# Patient Record
Sex: MALE | Race: WHITE | Employment: OTHER | URBAN - METROPOLITAN AREA
[De-identification: names, ages, dates, MRNs, and addresses within clinical notes are randomized per-mention and may not be internally consistent; named-entity substitution may affect disease eponyms.]

---

## 2017-01-06 ENCOUNTER — TELEPHONE (OUTPATIENT)
Dept: INTERNAL MEDICINE | Age: 82
End: 2017-01-06

## 2017-01-06 ENCOUNTER — TELEPHONE (OUTPATIENT)
Dept: PHARMACY | Age: 82
End: 2017-01-06

## 2017-01-09 ENCOUNTER — OFFICE VISIT (OUTPATIENT)
Dept: INTERNAL MEDICINE | Age: 82
End: 2017-01-09

## 2017-01-09 VITALS
DIASTOLIC BLOOD PRESSURE: 62 MMHG | BODY MASS INDEX: 24.12 KG/M2 | WEIGHT: 154 LBS | SYSTOLIC BLOOD PRESSURE: 104 MMHG | HEART RATE: 60 BPM | RESPIRATION RATE: 16 BRPM

## 2017-01-09 DIAGNOSIS — I10 ESSENTIAL HYPERTENSION, BENIGN: ICD-10-CM

## 2017-01-09 DIAGNOSIS — I48.20 CHRONIC ATRIAL FIBRILLATION (HCC): ICD-10-CM

## 2017-01-09 DIAGNOSIS — R41.3 MEMORY LOSS: ICD-10-CM

## 2017-01-09 DIAGNOSIS — F22 DELUSIONS (HCC): Primary | ICD-10-CM

## 2017-01-09 DIAGNOSIS — K08.9 POOR DENTITION: ICD-10-CM

## 2017-01-09 PROCEDURE — 99214 OFFICE O/P EST MOD 30 MIN: CPT | Performed by: INTERNAL MEDICINE

## 2017-01-09 ASSESSMENT — ENCOUNTER SYMPTOMS
BACK PAIN: 0
GASTROINTESTINAL NEGATIVE: 1
RESPIRATORY NEGATIVE: 1

## 2017-01-11 ENCOUNTER — TELEPHONE (OUTPATIENT)
Dept: INTERNAL MEDICINE | Age: 82
End: 2017-01-11

## 2017-01-11 RX ORDER — RISPERIDONE 1 MG/1
1 TABLET, FILM COATED ORAL NIGHTLY
Qty: 30 TABLET | Refills: 5 | Status: SHIPPED | OUTPATIENT
Start: 2017-01-11 | End: 2017-06-20 | Stop reason: SDUPTHER

## 2017-01-11 RX ORDER — LORAZEPAM 0.5 MG/1
0.5 TABLET ORAL EVERY 6 HOURS PRN
Qty: 30 TABLET | Refills: 1 | OUTPATIENT
Start: 2017-01-11 | End: 2018-01-11

## 2017-01-16 ENCOUNTER — ANTI-COAG VISIT (OUTPATIENT)
Dept: PHARMACY | Age: 82
End: 2017-01-16

## 2017-01-16 DIAGNOSIS — I48.20 CHRONIC ATRIAL FIBRILLATION (HCC): ICD-10-CM

## 2017-01-16 DIAGNOSIS — Z79.01 LONG TERM CURRENT USE OF ANTICOAGULANT THERAPY: ICD-10-CM

## 2017-01-16 LAB — INR BLD: 1.9

## 2017-01-17 RX ORDER — TAMSULOSIN HYDROCHLORIDE 0.4 MG/1
CAPSULE ORAL
Qty: 30 CAPSULE | Refills: 5 | Status: SHIPPED | OUTPATIENT
Start: 2017-01-17 | End: 2017-07-16 | Stop reason: SDUPTHER

## 2017-01-23 RX ORDER — DIGOXIN 125 MCG
TABLET ORAL
Qty: 90 TABLET | Refills: 3 | Status: SHIPPED | OUTPATIENT
Start: 2017-01-23 | End: 2018-01-11 | Stop reason: SDUPTHER

## 2017-01-31 RX ORDER — ALENDRONATE SODIUM 70 MG/1
TABLET ORAL
Qty: 12 TABLET | Refills: 3 | Status: SHIPPED | OUTPATIENT
Start: 2017-01-31 | End: 2018-02-13 | Stop reason: SDUPTHER

## 2017-02-13 ENCOUNTER — OFFICE VISIT (OUTPATIENT)
Dept: INTERNAL MEDICINE | Age: 82
End: 2017-02-13

## 2017-02-13 VITALS
HEART RATE: 60 BPM | DIASTOLIC BLOOD PRESSURE: 68 MMHG | BODY MASS INDEX: 22.4 KG/M2 | RESPIRATION RATE: 16 BRPM | WEIGHT: 143 LBS | SYSTOLIC BLOOD PRESSURE: 100 MMHG

## 2017-02-13 DIAGNOSIS — I48.20 CHRONIC ATRIAL FIBRILLATION (HCC): Primary | ICD-10-CM

## 2017-02-13 DIAGNOSIS — F22 DELUSIONS (HCC): ICD-10-CM

## 2017-02-13 DIAGNOSIS — R41.3 MEMORY LOSS: ICD-10-CM

## 2017-02-13 DIAGNOSIS — K08.9 POOR DENTITION: ICD-10-CM

## 2017-02-13 LAB
INTERNATIONAL NORMALIZATION RATIO, POC: 2
PROTHROMBIN TIME, POC: 24.4

## 2017-02-13 PROCEDURE — 85610 PROTHROMBIN TIME: CPT | Performed by: INTERNAL MEDICINE

## 2017-02-13 PROCEDURE — 99214 OFFICE O/P EST MOD 30 MIN: CPT | Performed by: INTERNAL MEDICINE

## 2017-02-13 ASSESSMENT — ENCOUNTER SYMPTOMS
RESPIRATORY NEGATIVE: 1
BACK PAIN: 0
GASTROINTESTINAL NEGATIVE: 1

## 2017-02-16 RX ORDER — WARFARIN SODIUM 5 MG/1
TABLET ORAL
Qty: 30 TABLET | Refills: 5 | Status: SHIPPED | OUTPATIENT
Start: 2017-02-16 | End: 2017-08-23 | Stop reason: SDUPTHER

## 2017-02-17 ENCOUNTER — ANTI-COAG VISIT (OUTPATIENT)
Dept: PHARMACY | Age: 82
End: 2017-02-17

## 2017-02-17 DIAGNOSIS — Z79.01 LONG TERM CURRENT USE OF ANTICOAGULANT THERAPY: ICD-10-CM

## 2017-02-17 DIAGNOSIS — I48.20 CHRONIC ATRIAL FIBRILLATION (HCC): ICD-10-CM

## 2017-02-20 ENCOUNTER — TELEPHONE (OUTPATIENT)
Dept: INTERNAL MEDICINE | Age: 82
End: 2017-02-20

## 2017-03-13 ENCOUNTER — OFFICE VISIT (OUTPATIENT)
Dept: INTERNAL MEDICINE | Age: 82
End: 2017-03-13

## 2017-03-13 VITALS
SYSTOLIC BLOOD PRESSURE: 100 MMHG | RESPIRATION RATE: 16 BRPM | WEIGHT: 145 LBS | HEART RATE: 60 BPM | BODY MASS INDEX: 22.71 KG/M2 | DIASTOLIC BLOOD PRESSURE: 52 MMHG

## 2017-03-13 DIAGNOSIS — I10 ESSENTIAL HYPERTENSION, BENIGN: ICD-10-CM

## 2017-03-13 DIAGNOSIS — I48.20 CHRONIC ATRIAL FIBRILLATION (HCC): ICD-10-CM

## 2017-03-13 DIAGNOSIS — R41.3 MEMORY LOSS: ICD-10-CM

## 2017-03-13 DIAGNOSIS — K08.9 POOR DENTITION: ICD-10-CM

## 2017-03-13 DIAGNOSIS — F22 DELUSIONS (HCC): Primary | ICD-10-CM

## 2017-03-13 PROCEDURE — 99214 OFFICE O/P EST MOD 30 MIN: CPT | Performed by: INTERNAL MEDICINE

## 2017-03-13 ASSESSMENT — ENCOUNTER SYMPTOMS
BACK PAIN: 0
GASTROINTESTINAL NEGATIVE: 1
RESPIRATORY NEGATIVE: 1

## 2017-03-22 ENCOUNTER — ANTI-COAG VISIT (OUTPATIENT)
Dept: PHARMACY | Age: 82
End: 2017-03-22

## 2017-03-22 DIAGNOSIS — Z79.01 LONG TERM CURRENT USE OF ANTICOAGULANT THERAPY: ICD-10-CM

## 2017-03-22 DIAGNOSIS — I48.20 CHRONIC ATRIAL FIBRILLATION (HCC): ICD-10-CM

## 2017-03-22 LAB — INR BLD: 2.8

## 2017-04-28 ENCOUNTER — ANTI-COAG VISIT (OUTPATIENT)
Dept: PHARMACY | Age: 82
End: 2017-04-28

## 2017-04-28 DIAGNOSIS — Z79.01 LONG TERM CURRENT USE OF ANTICOAGULANT THERAPY: ICD-10-CM

## 2017-04-28 DIAGNOSIS — I48.20 CHRONIC ATRIAL FIBRILLATION (HCC): ICD-10-CM

## 2017-04-28 LAB
INR BLD: 3.8
PROTIME: 45.9 SECONDS

## 2017-05-16 RX ORDER — ATORVASTATIN CALCIUM 40 MG/1
TABLET, FILM COATED ORAL
Qty: 30 TABLET | Refills: 5 | Status: SHIPPED | OUTPATIENT
Start: 2017-05-16 | End: 2017-10-14 | Stop reason: SDUPTHER

## 2017-05-23 ENCOUNTER — ANTI-COAG VISIT (OUTPATIENT)
Dept: PHARMACY | Age: 82
End: 2017-05-23

## 2017-05-23 DIAGNOSIS — Z79.01 LONG TERM CURRENT USE OF ANTICOAGULANT THERAPY: ICD-10-CM

## 2017-05-23 DIAGNOSIS — I48.91 ATRIAL FIBRILLATION, UNSPECIFIED TYPE (HCC): ICD-10-CM

## 2017-05-23 LAB — INR BLD: 2.9

## 2017-06-19 ENCOUNTER — ANTI-COAG VISIT (OUTPATIENT)
Dept: PHARMACY | Age: 82
End: 2017-06-19

## 2017-06-19 DIAGNOSIS — Z79.01 LONG TERM CURRENT USE OF ANTICOAGULANT THERAPY: ICD-10-CM

## 2017-06-19 DIAGNOSIS — I48.91 ATRIAL FIBRILLATION, UNSPECIFIED TYPE (HCC): ICD-10-CM

## 2017-06-19 LAB — INR BLD: 2.8

## 2017-06-20 ENCOUNTER — OFFICE VISIT (OUTPATIENT)
Dept: INTERNAL MEDICINE | Age: 82
End: 2017-06-20

## 2017-06-20 VITALS
RESPIRATION RATE: 14 BRPM | BODY MASS INDEX: 24.9 KG/M2 | HEART RATE: 68 BPM | SYSTOLIC BLOOD PRESSURE: 100 MMHG | WEIGHT: 159 LBS | DIASTOLIC BLOOD PRESSURE: 60 MMHG

## 2017-06-20 DIAGNOSIS — F22 DELUSIONS (HCC): ICD-10-CM

## 2017-06-20 DIAGNOSIS — I10 ESSENTIAL HYPERTENSION, BENIGN: Primary | ICD-10-CM

## 2017-06-20 DIAGNOSIS — R41.3 MEMORY LOSS: ICD-10-CM

## 2017-06-20 DIAGNOSIS — I48.91 ATRIAL FIBRILLATION, UNSPECIFIED TYPE (HCC): ICD-10-CM

## 2017-06-20 PROCEDURE — 99214 OFFICE O/P EST MOD 30 MIN: CPT | Performed by: INTERNAL MEDICINE

## 2017-06-20 RX ORDER — RISPERIDONE 0.5 MG/1
0.5 TABLET, FILM COATED ORAL DAILY
COMMUNITY
End: 2017-12-18 | Stop reason: SDUPTHER

## 2017-06-20 ASSESSMENT — ENCOUNTER SYMPTOMS
GASTROINTESTINAL NEGATIVE: 1
RESPIRATORY NEGATIVE: 1
BACK PAIN: 0

## 2017-07-11 ENCOUNTER — ANTI-COAG VISIT (OUTPATIENT)
Dept: PHARMACY | Age: 82
End: 2017-07-11

## 2017-07-11 DIAGNOSIS — Z79.01 LONG TERM CURRENT USE OF ANTICOAGULANT THERAPY: ICD-10-CM

## 2017-07-11 DIAGNOSIS — I48.91 ATRIAL FIBRILLATION, UNSPECIFIED TYPE (HCC): ICD-10-CM

## 2017-07-11 LAB — INR BLD: 2.8

## 2017-08-14 ENCOUNTER — ANTI-COAG VISIT (OUTPATIENT)
Dept: PHARMACY | Facility: CLINIC | Age: 82
End: 2017-08-14

## 2017-08-14 DIAGNOSIS — I48.91 ATRIAL FIBRILLATION, UNSPECIFIED TYPE (HCC): ICD-10-CM

## 2017-08-14 DIAGNOSIS — Z79.01 LONG TERM CURRENT USE OF ANTICOAGULANT THERAPY: ICD-10-CM

## 2017-08-14 LAB — INR BLD: 3

## 2017-08-18 ENCOUNTER — OFFICE VISIT (OUTPATIENT)
Dept: INTERNAL MEDICINE | Age: 82
End: 2017-08-18

## 2017-08-18 VITALS
BODY MASS INDEX: 24.75 KG/M2 | WEIGHT: 158 LBS | RESPIRATION RATE: 16 BRPM | HEART RATE: 60 BPM | DIASTOLIC BLOOD PRESSURE: 62 MMHG | SYSTOLIC BLOOD PRESSURE: 100 MMHG

## 2017-08-18 DIAGNOSIS — I48.91 ATRIAL FIBRILLATION, UNSPECIFIED TYPE (HCC): ICD-10-CM

## 2017-08-18 DIAGNOSIS — I10 ESSENTIAL HYPERTENSION, BENIGN: Primary | ICD-10-CM

## 2017-08-18 DIAGNOSIS — F22 DELUSIONS (HCC): ICD-10-CM

## 2017-08-18 DIAGNOSIS — E78.00 PURE HYPERCHOLESTEROLEMIA: ICD-10-CM

## 2017-08-18 DIAGNOSIS — R41.3 MEMORY LOSS: ICD-10-CM

## 2017-08-18 PROCEDURE — 36415 COLL VENOUS BLD VENIPUNCTURE: CPT | Performed by: INTERNAL MEDICINE

## 2017-08-18 PROCEDURE — 99214 OFFICE O/P EST MOD 30 MIN: CPT | Performed by: INTERNAL MEDICINE

## 2017-08-18 ASSESSMENT — ENCOUNTER SYMPTOMS
BACK PAIN: 0
RESPIRATORY NEGATIVE: 1
GASTROINTESTINAL NEGATIVE: 1

## 2017-08-21 LAB
A/G RATIO: 1.6 (ref 1.1–2.2)
ALBUMIN SERPL-MCNC: 3.9 G/DL (ref 3.4–5)
ALP BLD-CCNC: 101 U/L (ref 40–129)
ALT SERPL-CCNC: 19 U/L (ref 10–40)
ANION GAP SERPL CALCULATED.3IONS-SCNC: 15 MMOL/L (ref 3–16)
AST SERPL-CCNC: 30 U/L (ref 15–37)
BASOPHILS ABSOLUTE: 0 K/UL (ref 0–0.2)
BASOPHILS RELATIVE PERCENT: 0.5 %
BILIRUB SERPL-MCNC: 1 MG/DL (ref 0–1)
BUN BLDV-MCNC: 13 MG/DL (ref 7–20)
CALCIUM SERPL-MCNC: 9.3 MG/DL (ref 8.3–10.6)
CHLORIDE BLD-SCNC: 104 MMOL/L (ref 99–110)
CHOLESTEROL, TOTAL: 117 MG/DL (ref 0–199)
CO2: 23 MMOL/L (ref 21–32)
CREAT SERPL-MCNC: 0.9 MG/DL (ref 0.8–1.3)
EOSINOPHILS ABSOLUTE: 0.3 K/UL (ref 0–0.6)
EOSINOPHILS RELATIVE PERCENT: 3.9 %
GFR AFRICAN AMERICAN: >60
GFR NON-AFRICAN AMERICAN: >60
GLOBULIN: 2.5 G/DL
GLUCOSE BLD-MCNC: 132 MG/DL (ref 70–99)
HCT VFR BLD CALC: 42.4 % (ref 40.5–52.5)
HDLC SERPL-MCNC: 40 MG/DL (ref 40–60)
HEMOGLOBIN: 14.1 G/DL (ref 13.5–17.5)
LDL CHOLESTEROL CALCULATED: 53 MG/DL
LYMPHOCYTES ABSOLUTE: 1 K/UL (ref 1–5.1)
LYMPHOCYTES RELATIVE PERCENT: 13.3 %
MCH RBC QN AUTO: 33.6 PG (ref 26–34)
MCHC RBC AUTO-ENTMCNC: 33.4 G/DL (ref 31–36)
MCV RBC AUTO: 100.6 FL (ref 80–100)
MONOCYTES ABSOLUTE: 0.9 K/UL (ref 0–1.3)
MONOCYTES RELATIVE PERCENT: 11.3 %
NEUTROPHILS ABSOLUTE: 5.5 K/UL (ref 1.7–7.7)
NEUTROPHILS RELATIVE PERCENT: 71 %
PDW BLD-RTO: 13.6 % (ref 12.4–15.4)
PLATELET # BLD: 210 K/UL (ref 135–450)
PMV BLD AUTO: 9.7 FL (ref 5–10.5)
POTASSIUM SERPL-SCNC: 4.8 MMOL/L (ref 3.5–5.1)
RBC # BLD: 4.21 M/UL (ref 4.2–5.9)
SODIUM BLD-SCNC: 142 MMOL/L (ref 136–145)
TOTAL PROTEIN: 6.4 G/DL (ref 6.4–8.2)
TRIGL SERPL-MCNC: 122 MG/DL (ref 0–150)
TSH SERPL DL<=0.05 MIU/L-ACNC: 2.3 UIU/ML (ref 0.27–4.2)
VLDLC SERPL CALC-MCNC: 24 MG/DL
WBC # BLD: 7.7 K/UL (ref 4–11)

## 2017-09-12 ENCOUNTER — TELEPHONE (OUTPATIENT)
Dept: PHARMACY | Age: 82
End: 2017-09-12

## 2017-09-18 ENCOUNTER — ANTI-COAG VISIT (OUTPATIENT)
Dept: PHARMACY | Age: 82
End: 2017-09-18

## 2017-09-18 DIAGNOSIS — Z79.01 LONG TERM CURRENT USE OF ANTICOAGULANT THERAPY: ICD-10-CM

## 2017-09-18 DIAGNOSIS — I48.91 ATRIAL FIBRILLATION, UNSPECIFIED TYPE (HCC): ICD-10-CM

## 2017-09-18 LAB
INR BLD: 3.5
PROTIME: 41.6 SECONDS

## 2017-10-16 ENCOUNTER — ANTI-COAG VISIT (OUTPATIENT)
Dept: PHARMACY | Age: 82
End: 2017-10-16

## 2017-10-16 DIAGNOSIS — I48.91 ATRIAL FIBRILLATION, UNSPECIFIED TYPE (HCC): ICD-10-CM

## 2017-10-16 DIAGNOSIS — Z79.01 LONG TERM CURRENT USE OF ANTICOAGULANT THERAPY: ICD-10-CM

## 2017-10-16 LAB
INR BLD: 3.6
PROTIME: 43.1 SECONDS

## 2017-10-16 NOTE — PROGRESS NOTES
Mr. Yesica Lance is a 80 y.o. y/o male with history of Afib since 2012 who presents today for anticoagulation monitoring and adjustment. Pertinent PMH:Heart valve replaced(porcine) 2012;  has been taking warfarin since about 2012 at the time of the heart valve replacement. Patient Reported Findings:  Yes     No  [x]   []       Patient verifies current dosing regimen as listed  []   [x]       S/S bleeding/bruising/swelling/SOB  []   [x]       Blood in urine or stool  []   [x]       Procedures scheduled in the future at this time  []   [x]       Missed Dose  []   [x]       Extra Dose  []   [x]       Change in medications  []   [x]       Change in health/diet/appetite Has not been eating well as patient is having troubles with dentures. Would be difficult for patient's wife to increase vegetable intake  []   [x]       Change in alcohol use  []   [x]       Change in activity  []   [x]       Hospital admission  []   [x]       Emergency department visit  []   [x]       Other complaints    Clinical Outcomes:  Yes     No  []   [x]       Major bleeding event  []   [x]       Thromboembolic event  Pt's wife manages his medications. She previously reported that patient's dementia is \"getting worse\". Duration of warfarin Therapy: indefinite  INR Range:  2.0-3.0    INR is 3.5 today. Plan: Since will not increase vegetable/ vit k intake, decrease weekly dose of warfarin to 2.5mg on Mon and Fri and 5mg all other days. RTC in 3 weeks, 11/6. With wife    Patient and wife will be seeing a new MD, Artist Leventhal, sending new collaborative if MD would like CC to continue to monitor. Patient's wife discussed going to lab that is closer to home to get INR checked and then having MD f/u.  Will determine what plan is after appt with new MD.     Referring PCP is Dr. Keri Bennett [facility] Physician)  INR (no units)   Date Value   10/16/2017 3.6   09/18/2017 3.5   08/14/2017 3   07/11/2017 2.8

## 2017-10-17 ENCOUNTER — OFFICE VISIT (OUTPATIENT)
Dept: INTERNAL MEDICINE | Age: 82
End: 2017-10-17

## 2017-10-17 VITALS
SYSTOLIC BLOOD PRESSURE: 104 MMHG | WEIGHT: 156 LBS | OXYGEN SATURATION: 96 % | BODY MASS INDEX: 25.07 KG/M2 | HEIGHT: 66 IN | DIASTOLIC BLOOD PRESSURE: 68 MMHG | HEART RATE: 66 BPM

## 2017-10-17 DIAGNOSIS — I10 ESSENTIAL HYPERTENSION, BENIGN: ICD-10-CM

## 2017-10-17 DIAGNOSIS — R41.3 MEMORY LOSS: ICD-10-CM

## 2017-10-17 DIAGNOSIS — I48.91 ATRIAL FIBRILLATION, UNSPECIFIED TYPE (HCC): ICD-10-CM

## 2017-10-17 DIAGNOSIS — E55.9 VITAMIN D DEFICIENCY: ICD-10-CM

## 2017-10-17 DIAGNOSIS — Z23 NEED FOR INFLUENZA VACCINATION: ICD-10-CM

## 2017-10-17 DIAGNOSIS — E78.00 PURE HYPERCHOLESTEROLEMIA: ICD-10-CM

## 2017-10-17 DIAGNOSIS — H91.93 BILATERAL HEARING LOSS, UNSPECIFIED HEARING LOSS TYPE: ICD-10-CM

## 2017-10-17 DIAGNOSIS — I35.9 AORTIC VALVE DISORDER: ICD-10-CM

## 2017-10-17 DIAGNOSIS — M81.0 OSTEOPOROSIS, UNSPECIFIED OSTEOPOROSIS TYPE, UNSPECIFIED PATHOLOGICAL FRACTURE PRESENCE: ICD-10-CM

## 2017-10-17 DIAGNOSIS — Z00.00 MEDICARE ANNUAL WELLNESS VISIT, INITIAL: Primary | ICD-10-CM

## 2017-10-17 PROCEDURE — G0439 PPPS, SUBSEQ VISIT: HCPCS | Performed by: INTERNAL MEDICINE

## 2017-10-17 PROCEDURE — G0008 ADMIN INFLUENZA VIRUS VAC: HCPCS | Performed by: INTERNAL MEDICINE

## 2017-10-17 PROCEDURE — 90662 IIV NO PRSV INCREASED AG IM: CPT | Performed by: INTERNAL MEDICINE

## 2017-10-17 RX ORDER — RISPERIDONE 1 MG/1
TABLET, FILM COATED ORAL
Qty: 30 TABLET | Refills: 0 | Status: SHIPPED | OUTPATIENT
Start: 2017-10-17 | End: 2017-12-18 | Stop reason: SDUPTHER

## 2017-10-17 ASSESSMENT — ENCOUNTER SYMPTOMS
ABDOMINAL PAIN: 0
NAUSEA: 0
CHEST TIGHTNESS: 0
VOMITING: 0
SHORTNESS OF BREATH: 0

## 2017-10-17 NOTE — PROGRESS NOTES
Vaccine Information Sheet, \"Influenza - Inactivated\"  given to Marcus Hoff, or parent/legal guardian of  Marcus Hoff and verbalized understanding. Patient responses:    Have you ever had a reaction to a flu vaccine? No  Are you able to eat eggs without adverse effects? Yes  Do you have any current illness? No  Have you ever had Guillian [place] Syndrome? No    Flu vaccine given per order. Please see immunization tab.

## 2017-10-17 NOTE — PROGRESS NOTES
BREAKFAST, ON AN EMPTY STOMACH: REMAIN UPRIGHT FOR 30 MINUTES 1/31/17  Yes Lilian Francisco MD   digoxin Shannan Hardinger) 125 MCG tablet TAKE ONE TABLET BY MOUTH DAILY 1/23/17  Yes Lilian Francisco MD   LORazepam (ATIVAN) 0.5 MG tablet Take 1 tablet by mouth every 6 hours as needed for Anxiety 1/11/17 1/11/18 Yes Lilian Francisco MD   Omega-3 Fatty Acids (FISH OIL) 1000 MG CPDR Take 1 capsule by mouth daily. Yes Historical Provider, MD   acetaminophen (TYLENOL) 325 MG tablet Take 2 tablets by mouth every 6 hours as needed for Pain or Fever (For mild pain level 1-3 or for fever > 100.5). 6/28/12  Yes YUNIOR Pastrana   aspirin 81 MG EC tablet Take 81 mg by mouth daily. Yes Historical Provider, MD   Cholecalciferol (VITAMIN D) 1000 UNIT TABS Take 2 tablets by mouth daily. 6/10/10  Yes Historical Provider, MD   Coenzyme Q10 (CO Q-10 PO) Take 1 tablet by mouth daily. 6/10/10  Yes Historical Provider, MD   Multiple Vitamin (MULTIVITAMIN PO) Take 1 tablet by mouth daily. 6/10/10  Yes Historical Provider, MD   warfarin (COUMADIN) 5 MG tablet TAKE ONE TABLET BY MOUTH DAILY 10/18/17   Nell Denver, MD   atorvastatin (LIPITOR) 40 MG tablet TAKE ONE TABLET BY MOUTH DAILY 10/18/17   Nell Denver, MD   tamsulosin (FLOMAX) 0.4 MG capsule TAKE ONE CAPSULE BY MOUTH DAILY 10/18/17   Nell Denver, MD   risperiDONE (RISPERDAL) 1 MG tablet TAKE ONE TABLET BY MOUTH ONCE NIGHTLY 10/17/17   Nell Denver, MD       REVIEW OF SYSTEMS:  Review of Systems   Constitutional: Negative for chills. Respiratory: Negative for chest tightness and shortness of breath. Cardiovascular: Negative for chest pain. Gastrointestinal: Negative for abdominal pain, nausea and vomiting. Genitourinary: Positive for flank pain. Neurological: Negative for weakness, numbness and headaches. Screening:    Last eye exam: recently- patient states that the vision is good.      Immunization:    Immunization History   Administered Date(s) Administered    Influenza Virus Vaccine 10/24/2011, 11/16/2013, 10/28/2014, 09/18/2015    Influenza, Cachorro Beltran, 3 yrs and older, IM, Preservative Free 09/15/2016    Pneumococcal 13-valent Conjugate ([id number]) 11/17/2015    Pneumococcal Polysaccharide ([id number]) 11/01/2004, 11/07/2011    Td 02/21/2007    Tdap (Boostrix, Adacel) 04/09/2013       Physical Exam:      Vitals: /68   Pulse 66   Ht 5' 6\" (1.676 m)   Wt 156 lb (70.8 kg)   SpO2 96%   BMI 25.18 kg/m²     Body mass index is 25.18 kg/m². Wt Readings from Last 3 Encounters:   10/17/17 156 lb (70.8 kg)   08/18/17 158 lb (71.7 kg)   06/20/17 159 lb (72.1 kg)     Physical Exam   Constitutional: He is oriented to person, place, and time. He appears well-developed and well-nourished. No distress. HENT:   Head: Normocephalic and atraumatic. Right Ear: External ear normal.   Mouth/Throat: Oropharynx is clear and moist. No oropharyngeal exudate. Eyes: Conjunctivae and EOM are normal. Pupils are equal, round, and reactive to light. Right eye exhibits no discharge. Left eye exhibits no discharge. No scleral icterus. Neck: Normal range of motion. Neck supple. No thyromegaly present. Cardiovascular: Normal rate and normal heart sounds. No murmur heard. Pulmonary/Chest: Effort normal and breath sounds normal. No respiratory distress. He has no wheezes. He has no rales. Abdominal: Soft. There is no tenderness. Musculoskeletal: He exhibits no edema or tenderness. Pointed tenderness L lower flank - MSK    Lymphadenopathy:     He has no cervical adenopathy. Neurological: He is alert and oriented to person, place, and time. He has normal reflexes. He exhibits normal muscle tone. Skin: No rash noted. He is not diaphoretic. Psychiatric: His behavior is normal.   Memory is reduced. Assessment/Plan:   Josephine Restrepo was seen today for medicare awv.     Diagnoses and all orders for this visit:    Medicare annual wellness visit, initial    Bilateral hearing loss, unspecified hearing loss type  Hearing is impaired, despite hearing aid. Aortic valve disorder- s/p AVR  He is on Coumadin. INR 3.6 and he received lower dose of Coumadin yesterday. He will receive Coumadin 2.5 mg today also and than resume normal dose. Repeat INR in 2 weeks (instead of 3 weeks). Atrial fibrillation, unspecified type ([address]  On Warfarin.   -     PROTIME-INR; Future    Essential hypertension, benign  Controlled -no changes in meds. Pure hypercholesterolemia  No changes in meds     Memory loss  Looks like Alzheimer. Pt memory is reduced mainly in short term emmory. He is not alert to time. Other orders  -     INFLUENZA, HIGH DOSE, 65 YRS +, IM, PF, PREFILL SYR, 0.5ML (FLUZONE HD)    Patient was encouraged to call the office or be seen with MD for any worsening or lack of improvement.      Kimmy Landrum M.D.   10/30/2017, 8:10 PM

## 2017-10-18 RX ORDER — ATORVASTATIN CALCIUM 40 MG/1
TABLET, FILM COATED ORAL
Qty: 90 TABLET | Refills: 2 | Status: SHIPPED | OUTPATIENT
Start: 2017-10-18 | End: 2018-07-16 | Stop reason: SDUPTHER

## 2017-10-18 RX ORDER — WARFARIN SODIUM 5 MG/1
TABLET ORAL
Qty: 30 TABLET | Refills: 0 | Status: SHIPPED | OUTPATIENT
Start: 2017-10-18 | End: 2017-11-21 | Stop reason: SDUPTHER

## 2017-10-18 RX ORDER — TAMSULOSIN HYDROCHLORIDE 0.4 MG/1
CAPSULE ORAL
Qty: 30 CAPSULE | Refills: 0 | Status: SHIPPED | OUTPATIENT
Start: 2017-10-18 | End: 2017-11-21 | Stop reason: SDUPTHER

## 2017-10-24 ENCOUNTER — TELEPHONE (OUTPATIENT)
Dept: PHARMACY | Age: 82
End: 2017-10-24

## 2017-10-27 ENCOUNTER — ANTI-COAG VISIT (OUTPATIENT)
Dept: PHARMACY | Age: 82
End: 2017-10-27

## 2017-10-30 DIAGNOSIS — I48.91 ATRIAL FIBRILLATION, UNSPECIFIED TYPE (HCC): ICD-10-CM

## 2017-10-30 LAB
INR BLD: 3.47 (ref 0.85–1.15)
PROTHROMBIN TIME: 39.2 SEC (ref 9.6–13)

## 2017-10-31 ASSESSMENT — PATIENT HEALTH QUESTIONNAIRE - PHQ9: SUM OF ALL RESPONSES TO PHQ QUESTIONS 1-9: 0

## 2017-11-01 ENCOUNTER — HOSPITAL ENCOUNTER (OUTPATIENT)
Dept: OTHER | Age: 82
Discharge: HOME | End: 2017-11-30
Attending: INTERNAL MEDICINE | Admitting: INTERNAL MEDICINE

## 2017-11-15 ENCOUNTER — TELEPHONE (OUTPATIENT)
Dept: INTERNAL MEDICINE | Age: 82
End: 2017-11-15

## 2017-11-15 DIAGNOSIS — I35.9 AORTIC VALVE DISORDER: Primary | ICD-10-CM

## 2017-11-15 NOTE — TELEPHONE ENCOUNTER
Results given. He is currently taking 2.5mg on monday and friday, 5 mg the rest of the days. MD please advise any changes.

## 2017-11-21 ENCOUNTER — TELEPHONE (OUTPATIENT)
Dept: INTERNAL MEDICINE | Age: 82
End: 2017-11-21

## 2017-11-21 DIAGNOSIS — N40.0 BENIGN PROSTATIC HYPERPLASIA, UNSPECIFIED WHETHER LOWER URINARY TRACT SYMPTOMS PRESENT: Primary | ICD-10-CM

## 2017-11-21 DIAGNOSIS — I35.9 AORTIC VALVE DISORDER: ICD-10-CM

## 2017-11-21 RX ORDER — TAMSULOSIN HYDROCHLORIDE 0.4 MG/1
CAPSULE ORAL
Qty: 30 CAPSULE | Refills: 5 | Status: SHIPPED | OUTPATIENT
Start: 2017-11-21 | End: 2018-05-24 | Stop reason: SDUPTHER

## 2017-11-21 RX ORDER — WARFARIN SODIUM 5 MG/1
TABLET ORAL
Qty: 30 TABLET | Refills: 5 | Status: SHIPPED | OUTPATIENT
Start: 2017-11-21 | End: 2018-08-30 | Stop reason: SDUPTHER

## 2017-11-21 NOTE — TELEPHONE ENCOUNTER
Pt is out of refills and would like a new rx for warfarin (COUMADIN) 5 MG tablet and tamsulosin (FLOMAX) 0.4 MG capsule. Pt took his last pill today. Please send in a new rx or call with questions. Pharmacy info above.

## 2017-11-22 DIAGNOSIS — I35.9 AORTIC VALVE DISORDER: ICD-10-CM

## 2017-11-22 LAB
INR BLD: 2.89 (ref 0.85–1.15)
PROTHROMBIN TIME: 32.7 SEC (ref 9.6–13)

## 2017-11-28 ENCOUNTER — TELEPHONE (OUTPATIENT)
Dept: PHARMACY | Age: 82
End: 2017-11-28

## 2017-11-28 ENCOUNTER — ANTI-COAG VISIT (OUTPATIENT)
Dept: INTERNAL MEDICINE | Age: 82
End: 2017-11-28

## 2017-12-01 ENCOUNTER — HOSPITAL ENCOUNTER (OUTPATIENT)
Dept: OTHER | Age: 82
Discharge: HOME | End: 2017-12-31
Attending: INTERNAL MEDICINE | Admitting: INTERNAL MEDICINE

## 2017-12-08 ENCOUNTER — ANTI-COAG VISIT (OUTPATIENT)
Dept: PHARMACY | Age: 82
End: 2017-12-08

## 2017-12-08 DIAGNOSIS — I35.9 AORTIC VALVE DISORDER: ICD-10-CM

## 2017-12-08 LAB
INR BLD: 2.5
PROTIME: 29.6 SECONDS

## 2017-12-18 ENCOUNTER — TELEPHONE (OUTPATIENT)
Dept: INTERNAL MEDICINE | Age: 82
End: 2017-12-18

## 2017-12-18 RX ORDER — RISPERIDONE 0.5 MG/1
0.5 TABLET, FILM COATED ORAL DAILY
Qty: 90 TABLET | Refills: 1 | Status: SHIPPED | OUTPATIENT
Start: 2017-12-18 | End: 2018-06-11 | Stop reason: SDUPTHER

## 2017-12-18 NOTE — TELEPHONE ENCOUNTER
Patient needs a refill on risperiDONE (RISPERDAL) 0.5 MG tablet     . They need a 90 day supply.      Mail order or local pharmacy: local     [facility]     Patient  or mail to patient(If mail order):    Pt is out of medication the wife states the pharmacy has sent to request and haven't heard anything back

## 2018-01-01 ENCOUNTER — HOSPITAL ENCOUNTER (OUTPATIENT)
Dept: OTHER | Age: 83
Discharge: HOME | End: 2018-01-31
Attending: INTERNAL MEDICINE | Admitting: INTERNAL MEDICINE

## 2018-01-05 ENCOUNTER — ANTI-COAG VISIT (OUTPATIENT)
Dept: PHARMACY | Age: 83
End: 2018-01-05

## 2018-01-05 DIAGNOSIS — I35.9 AORTIC VALVE DISORDER: ICD-10-CM

## 2018-01-05 LAB
INR BLD: 4.5
PROTIME: 53.6 SECONDS

## 2018-01-05 NOTE — PROGRESS NOTES
Mr. Catalina Norton is a 80 y.o. y/o male with history of Afib since 2012 who presents today for anticoagulation monitoring and adjustment. Pertinent PMH:Heart valve replaced(porcine) 2012;  has been taking warfarin since about 2012 at the time of the heart valve replacement. Aortic valve disorder. Patient Reported Findings:  Yes     No  [x]   []       Patient verifies current dosing regimen as listed  []   [x]       S/S bleeding/bruising/swelling/SOB  []   [x]       Blood in urine or stool  []   [x]       Procedures scheduled in the future at this time  []   [x]       Missed Dose  []   [x]       Extra Dose  []   [x]       Change in medications  [x]   []       Change in health/diet/appetite - Patient's wife reports he hasn't been eating as much. He has been drinking V8. Patient's wife was blending up vegetables and having him drink, but hasn't been doing lately. She will start doing again. []   [x]       Change in alcohol use  []   [x]       Change in activity  []   [x]       Hospital admission  []   [x]       Emergency department visit  [x]   []       Other complaints - Had a cold two weeks ago, but no N/V/D. Patient and spouse moved to [place], therefore long drive to clinic. Patient states that unable to go to [facility] clinic because insurance won't cover. Will assess in January if able to go to [facility]. Clinical Outcomes:  Yes     No  []   [x]       Major bleeding event  []   [x]       Thromboembolic event    Pt's wife manages his medications. She previously reported that patient's dementia is \"getting worse\". Duration of warfarin Therapy: 12 months, 12/8/2018  INR Range:  2.0-3.0    INR is 4.5 today likely due to decreased appetite/change in diet. Plan to hold warfarin today then decrease weekly dose to 2.5mg Monday and Friday and 5mg all other days (7.7% decrease). Re-check INR 1/16. Will arrive with wife via [employer] transportation.      Referring PCP is Dr. Danielle Guo  INR

## 2018-01-11 RX ORDER — DIGOXIN 125 MCG
TABLET ORAL
Qty: 90 TABLET | Refills: 3 | Status: SHIPPED | OUTPATIENT
Start: 2018-01-11 | End: 2019-01-10 | Stop reason: SDUPTHER

## 2018-01-19 ENCOUNTER — OFFICE VISIT (OUTPATIENT)
Dept: INTERNAL MEDICINE | Age: 83
End: 2018-01-19

## 2018-01-19 VITALS
DIASTOLIC BLOOD PRESSURE: 56 MMHG | WEIGHT: 162 LBS | SYSTOLIC BLOOD PRESSURE: 96 MMHG | HEART RATE: 62 BPM | BODY MASS INDEX: 26.15 KG/M2

## 2018-01-19 DIAGNOSIS — E78.00 PURE HYPERCHOLESTEROLEMIA: ICD-10-CM

## 2018-01-19 DIAGNOSIS — I35.9 AORTIC VALVE DISORDER: ICD-10-CM

## 2018-01-19 DIAGNOSIS — I10 ESSENTIAL HYPERTENSION, BENIGN: Primary | ICD-10-CM

## 2018-01-19 DIAGNOSIS — L60.9 NAIL DISORDER: ICD-10-CM

## 2018-01-19 DIAGNOSIS — R41.3 MEMORY LOSS: ICD-10-CM

## 2018-01-19 DIAGNOSIS — G45.3 AF (AMAUROSIS FUGAX): ICD-10-CM

## 2018-01-19 DIAGNOSIS — Z51.81 ENCOUNTER FOR MONITORING DIGOXIN THERAPY: ICD-10-CM

## 2018-01-19 DIAGNOSIS — Z79.899 ENCOUNTER FOR MONITORING DIGOXIN THERAPY: ICD-10-CM

## 2018-01-19 DIAGNOSIS — Z95.2 S/P AVR: ICD-10-CM

## 2018-01-19 PROCEDURE — 99213 OFFICE O/P EST LOW 20 MIN: CPT | Performed by: INTERNAL MEDICINE

## 2018-01-19 ASSESSMENT — ENCOUNTER SYMPTOMS
ABDOMINAL PAIN: 0
NAUSEA: 0
CHEST TIGHTNESS: 0
VOMITING: 0
SHORTNESS OF BREATH: 0

## 2018-01-19 NOTE — PROGRESS NOTES
has no cervical adenopathy. Neurological: He is alert and oriented to person, place, and time. He has normal reflexes. He exhibits normal muscle tone. Skin: No rash noted. He is not diaphoretic. Psychiatric: His mood appears not anxious. His affect is blunt. His affect is not angry. His speech is delayed. He is slowed. He is not agitated, not aggressive, not hyperactive and not combative. He does not exhibit a depressed mood. Assessment/Plan:   Stella Montemayor was seen today for hypertension and hyperlipidemia. Diagnoses and all orders for this visit:    Essential hypertension, benign  Blood pressure is soft but pt asymptomatic so I would not changes Digoxin for now   -     CBC Auto Differential; Future  -     Comprehensive Metabolic Panel; Future  -     Lipid Panel; Future    Pure hypercholesterolemia  Well controlled- no changes in medication today. 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 Pt was informed that in urgent cases with difficulties     in scheduling- he can come to the office and he will be seen as soon as possible.     - Anticipated follow up in 3 months    Edmond Martin M.D.   1/19/2018, 11:18 AM

## 2018-01-22 ENCOUNTER — ANTI-COAG VISIT (OUTPATIENT)
Dept: PHARMACY | Age: 83
End: 2018-01-22

## 2018-01-22 DIAGNOSIS — I35.9 AORTIC VALVE DISORDER: ICD-10-CM

## 2018-01-22 LAB
INR BLD: 2.9
PROTIME: 35.1 SECONDS

## 2018-01-22 NOTE — PROGRESS NOTES
Mr. Miriam Jacobs is a 80 y.o. y/o male with history of Afib since 2012 who presents today for anticoagulation monitoring and adjustment. Pertinent PMH:Heart valve replaced(porcine) 2012;  has been taking warfarin since about 2012 at the time of the heart valve replacement. Aortic valve disorder. Patient Reported Findings:  Yes     No  [x]   []       Patient verifies current dosing regimen as listed - Verified by patient's wife who manages his medications  []   [x]       S/S bleeding/bruising/swelling/SOB  []   [x]       Blood in urine or stool  []   [x]       Procedures scheduled in the future at this time  []   [x]       Missed Dose  []   [x]       Extra Dose  []   [x]       Change in medications  []   [x]       Change in health/diet/appetite - Patient's wife states he continues to drink V8 daily. Sometimes he has twice per day. Also continues to drink 2-3 Ensure per day. Patient's wife occasionally blends up vegetables in a smoothie for him. []   [x]       Change in alcohol use  []   [x]       Change in activity  []   [x]       Hospital admission  []   [x]       Emergency department visit  []   [x]       Other complaints - Patient and [place] [facility], therefore long drive to clinic. Patient states that unable to go to [facility] clinic because insurance won't cover. Working to re-assess with the new year. Clinical Outcomes:  Yes     No  []   [x]       Major bleeding event  []   [x]       Thromboembolic event    Pt's wife manages his medications. She previously reported that patient's dementia is \"getting worse\". Duration of warfarin Therapy: Indefinite   INR Range:  2.0-3.0    INR is 2.9 today after dose decrease last visit. Continue same weekly dose of 2.5mg Monday and Friday and 5mg all other days. Encouraged patient's wife to maintain consistency with vitamin k. Re-check INR 4 weeks, 2/19. Will arrive with wife via [employer] transportation.   They are only allowed so many

## 2018-02-01 ENCOUNTER — HOSPITAL ENCOUNTER (OUTPATIENT)
Dept: OTHER | Age: 83
Discharge: HOME | End: 2018-02-28
Attending: INTERNAL MEDICINE | Admitting: INTERNAL MEDICINE

## 2018-02-02 ENCOUNTER — TELEPHONE (OUTPATIENT)
Dept: PHARMACY | Facility: CLINIC | Age: 83
End: 2018-02-02

## 2018-02-02 NOTE — TELEPHONE ENCOUNTER
[facility] fax[facility], [facility] and [facility] wife to begin having INR checked at the [facility] location. Called and spoke with [facility] and scheduled both pts for Monday 02/19 @ 1300 and 1315 respectively. Recent INR Results:  Lab Results   Component Value Date    INR 2.9 01/22/2018    INR 4.5 01/05/2018    INR 2.5 12/08/2017    INR 2.89 (H) 11/22/2017     Alessandra Mcdonough.  Sahil Mcneal RPT, [facility]  Medication Management Clinic  [facility] Ph: [phone]  [facility] Ph: [phone]  2/2/2018 10:56 AM

## 2018-02-19 ENCOUNTER — ANTI-COAG VISIT (OUTPATIENT)
Dept: PHARMACY | Facility: CLINIC | Age: 83
End: 2018-02-19

## 2018-02-19 DIAGNOSIS — Z79.899 ENCOUNTER FOR MONITORING DIGOXIN THERAPY: ICD-10-CM

## 2018-02-19 DIAGNOSIS — I35.9 AORTIC VALVE DISORDER: ICD-10-CM

## 2018-02-19 DIAGNOSIS — Z51.81 ENCOUNTER FOR MONITORING DIGOXIN THERAPY: ICD-10-CM

## 2018-02-19 DIAGNOSIS — Z95.2 S/P AVR: ICD-10-CM

## 2018-02-19 DIAGNOSIS — I10 ESSENTIAL HYPERTENSION, BENIGN: ICD-10-CM

## 2018-02-19 DIAGNOSIS — G45.3 AF (AMAUROSIS FUGAX): ICD-10-CM

## 2018-02-19 DIAGNOSIS — E78.00 PURE HYPERCHOLESTEROLEMIA: ICD-10-CM

## 2018-02-19 LAB
A/G RATIO: 1.7 (ref 1.1–2.2)
ALBUMIN SERPL-MCNC: 4.4 G/DL (ref 3.4–5)
ALP BLD-CCNC: 108 U/L (ref 40–129)
ALT SERPL-CCNC: 21 U/L (ref 10–40)
ANION GAP SERPL CALCULATED.3IONS-SCNC: 14 MMOL/L (ref 3–16)
AST SERPL-CCNC: 31 U/L (ref 15–37)
BASOPHILS ABSOLUTE: 0.1 K/UL (ref 0–0.2)
BASOPHILS RELATIVE PERCENT: 0.9 %
BILIRUB SERPL-MCNC: 1 MG/DL (ref 0–1)
BUN BLDV-MCNC: 16 MG/DL (ref 7–20)
CALCIUM SERPL-MCNC: 9.4 MG/DL (ref 8.3–10.6)
CHLORIDE BLD-SCNC: 106 MMOL/L (ref 99–110)
CHOLESTEROL, TOTAL: 133 MG/DL (ref 0–199)
CO2: 25 MMOL/L (ref 21–32)
CREAT SERPL-MCNC: 0.9 MG/DL (ref 0.8–1.3)
DIGOXIN LEVEL: 1.1 NG/ML (ref 0.8–2)
EOSINOPHILS ABSOLUTE: 0.3 K/UL (ref 0–0.6)
EOSINOPHILS RELATIVE PERCENT: 3.1 %
GFR AFRICAN AMERICAN: >60
GFR NON-AFRICAN AMERICAN: >60
GLOBULIN: 2.6 G/DL
GLUCOSE BLD-MCNC: 120 MG/DL (ref 70–99)
HCT VFR BLD CALC: 41.2 % (ref 40.5–52.5)
HDLC SERPL-MCNC: 48 MG/DL (ref 40–60)
HEMOGLOBIN: 14.1 G/DL (ref 13.5–17.5)
INR BLD: 2.74 (ref 0.85–1.15)
INTERNATIONAL NORMALIZATION RATIO, POC: 2.9
LDL CHOLESTEROL CALCULATED: 53 MG/DL
LYMPHOCYTES ABSOLUTE: 1.1 K/UL (ref 1–5.1)
LYMPHOCYTES RELATIVE PERCENT: 11.5 %
MCH RBC QN AUTO: 34.1 PG (ref 26–34)
MCHC RBC AUTO-ENTMCNC: 34.2 G/DL (ref 31–36)
MCV RBC AUTO: 99.5 FL (ref 80–100)
MONOCYTES ABSOLUTE: 0.7 K/UL (ref 0–1.3)
MONOCYTES RELATIVE PERCENT: 7.6 %
NEUTROPHILS ABSOLUTE: 7.3 K/UL (ref 1.7–7.7)
NEUTROPHILS RELATIVE PERCENT: 76.9 %
PDW BLD-RTO: 13.8 % (ref 12.4–15.4)
PLATELET # BLD: 214 K/UL (ref 135–450)
PMV BLD AUTO: 9.7 FL (ref 5–10.5)
POTASSIUM SERPL-SCNC: 4.5 MMOL/L (ref 3.5–5.1)
PROTHROMBIN TIME: 31 SEC (ref 9.6–13)
RBC # BLD: 4.14 M/UL (ref 4.2–5.9)
SODIUM BLD-SCNC: 145 MMOL/L (ref 136–145)
TOTAL PROTEIN: 7 G/DL (ref 6.4–8.2)
TRIGL SERPL-MCNC: 161 MG/DL (ref 0–150)
VLDLC SERPL CALC-MCNC: 32 MG/DL
WBC # BLD: 9.5 K/UL (ref 4–11)

## 2018-02-20 ENCOUNTER — ANTI-COAG VISIT (OUTPATIENT)
Dept: INTERNAL MEDICINE | Age: 83
End: 2018-02-20

## 2018-02-20 DIAGNOSIS — I35.9 AORTIC VALVE DISORDER: ICD-10-CM

## 2018-02-20 DIAGNOSIS — Z95.2 S/P AVR: ICD-10-CM

## 2018-02-28 RX ORDER — ALENDRONATE SODIUM 70 MG/1
TABLET ORAL
Qty: 12 TABLET | Refills: 0 | Status: ON HOLD | OUTPATIENT
Start: 2018-02-28 | End: 2020-01-29

## 2018-03-01 ENCOUNTER — HOSPITAL ENCOUNTER (OUTPATIENT)
Dept: OTHER | Age: 83
Discharge: HOME | End: 2018-03-31
Attending: INTERNAL MEDICINE | Admitting: INTERNAL MEDICINE

## 2018-03-05 ENCOUNTER — ANTI-COAG VISIT (OUTPATIENT)
Dept: PHARMACY | Facility: CLINIC | Age: 83
End: 2018-03-05

## 2018-03-05 DIAGNOSIS — Z95.2 S/P AVR: ICD-10-CM

## 2018-03-05 DIAGNOSIS — I35.9 AORTIC VALVE DISORDER: ICD-10-CM

## 2018-03-05 LAB — INTERNATIONAL NORMALIZATION RATIO, POC: 3.4

## 2018-03-19 ENCOUNTER — ANTI-COAG VISIT (OUTPATIENT)
Dept: PHARMACY | Facility: CLINIC | Age: 83
End: 2018-03-19

## 2018-03-19 DIAGNOSIS — I35.9 AORTIC VALVE DISORDER: ICD-10-CM

## 2018-03-19 DIAGNOSIS — Z95.2 S/P AVR: ICD-10-CM

## 2018-03-19 LAB — INTERNATIONAL NORMALIZATION RATIO, POC: 2.4

## 2018-03-19 NOTE — PROGRESS NOTES
ANTICOAGULATION SERVICE    Michelle Gayle is a 80 y.o. male with PMHx significant for AVR (2012), HTN, HLD who presents to clinic 3/19/2018 for anticoagulation monitoring and adjustment. Anticoagulation Indication(s):  Heart Valve Replacement - AVR    Referring Physician:  Dr. Jluis Almazan  Goal INR Range:  2-3  Duration of Anticoagulation Therapy:  Indefinite  Time of day dose taken:  PM  Product patient has at home:  warfarin 5 mg (peach)      INR Summary                            Warfarin regimen (mg)  Date INR   A/P    Sun Mon Tue Wed Thu Fri Sat Mg/wk  3/19 2.4 At goal, no change  5 2.5 5 2.5 5 2.5 5 27.5  3/5 3.4 Above goal, decrease  5 2.5 5 2.5 5 2.5 5 27.5  2/19 2.9 At goal, no change  5 2.5 5 5 5 2.5 5 30    Patient History:  Recent hospitalizations/HC visits Patient was previously enrolled in Coumadin clinic at [facility]   Recent medication changes None reported today   Medications taken regularly that may interact with warfarin or alter INR ASA 81 mg, Co Q-10, fish oil   Warfarin dose taken as prescribed Uses pillbox filled by wife, Giorgi Mata   Signs/symptoms of bleeding No h/o major bleeding reported   Vitamin K intake -Normally has ~0 servings of green, leafy vegetables per week  -Drinks Ensure and V8 both daily   Recent vomiting/diarrhea/fever, changes in weight or activity level None reported   Tobacco or alcohol use Patient denies both   Upcoming surgeries or procedures None reported     Assessment/Plan:  Patient's INR was therapeutic today (2.4). Baby Esperanza (wife) states she tries to get patient to eat more greens, but lately his overall appetite has been declining. Patient does drink Ensure and V8 juice daily, which he will continue. Patient was instructed to continue warfarin 2.5 mg on MWF, and 5 mg all other days. Repeat INR in 4 weeks. Patient was reminded to maintain consistent vitamin K intake and call with any bleeding, medication changes, or fever/vomiting/diarrhea.     Next Clinic Appointment: 4/16    Please call [facility] Medication Management Clinic at [phone] with any questions. Patient understands dosing directions and information discussed. Dosing schedule and follow up appointment given to patient. Progress note routed to referring physician's office. Patient acknowledges working in consult agreement with pharmacist as referred by his/her physician. Thanks! Jaycee Stephenson.  Hanna TateD  [facility] Medication Management [facility]  Ph: [phone]  3/19/2018 1:15 PM

## 2018-04-01 ENCOUNTER — HOSPITAL ENCOUNTER (OUTPATIENT)
Dept: OTHER | Age: 83
Discharge: HOME | End: 2018-04-30
Attending: INTERNAL MEDICINE | Admitting: INTERNAL MEDICINE

## 2018-04-16 ENCOUNTER — OFFICE VISIT (OUTPATIENT)
Dept: INTERNAL MEDICINE | Age: 83
End: 2018-04-16

## 2018-04-16 ENCOUNTER — ANTI-COAG VISIT (OUTPATIENT)
Dept: PHARMACY | Facility: CLINIC | Age: 83
End: 2018-04-16

## 2018-04-16 VITALS
HEART RATE: 75 BPM | SYSTOLIC BLOOD PRESSURE: 104 MMHG | BODY MASS INDEX: 26.79 KG/M2 | WEIGHT: 166 LBS | OXYGEN SATURATION: 96 % | DIASTOLIC BLOOD PRESSURE: 74 MMHG

## 2018-04-16 DIAGNOSIS — I35.9 AORTIC VALVE DISORDER: ICD-10-CM

## 2018-04-16 DIAGNOSIS — E78.00 PURE HYPERCHOLESTEROLEMIA: ICD-10-CM

## 2018-04-16 DIAGNOSIS — I10 ESSENTIAL HYPERTENSION, BENIGN: Primary | ICD-10-CM

## 2018-04-16 DIAGNOSIS — F03.90 DEMENTIA WITHOUT BEHAVIORAL DISTURBANCE, UNSPECIFIED DEMENTIA TYPE: ICD-10-CM

## 2018-04-16 DIAGNOSIS — N39.41 URGE INCONTINENCE OF URINE: ICD-10-CM

## 2018-04-16 DIAGNOSIS — Z95.2 S/P AVR: ICD-10-CM

## 2018-04-16 LAB — INTERNATIONAL NORMALIZATION RATIO, POC: 2

## 2018-04-16 PROCEDURE — 99214 OFFICE O/P EST MOD 30 MIN: CPT | Performed by: INTERNAL MEDICINE

## 2018-04-16 RX ORDER — MEMANTINE HYDROCHLORIDE 5 MG/1
5 TABLET ORAL DAILY
Qty: 30 TABLET | Refills: 2 | Status: SHIPPED | OUTPATIENT
Start: 2018-04-16 | End: 2018-07-13 | Stop reason: SDUPTHER

## 2018-04-16 ASSESSMENT — ENCOUNTER SYMPTOMS
NAUSEA: 0
ABDOMINAL PAIN: 0
BACK PAIN: 1
SHORTNESS OF BREATH: 0
CHEST TIGHTNESS: 0
VOMITING: 0

## 2018-04-23 ENCOUNTER — OFFICE VISIT (OUTPATIENT)
Dept: CARDIOLOGY CLINIC | Age: 83
End: 2018-04-23

## 2018-04-23 VITALS
WEIGHT: 165 LBS | HEART RATE: 60 BPM | DIASTOLIC BLOOD PRESSURE: 80 MMHG | BODY MASS INDEX: 26.63 KG/M2 | SYSTOLIC BLOOD PRESSURE: 102 MMHG

## 2018-04-23 DIAGNOSIS — Z95.2 S/P AVR (AORTIC VALVE REPLACEMENT): Primary | ICD-10-CM

## 2018-04-23 DIAGNOSIS — I10 ESSENTIAL HYPERTENSION: ICD-10-CM

## 2018-04-23 DIAGNOSIS — I48.20 CHRONIC ATRIAL FIBRILLATION (HCC): ICD-10-CM

## 2018-04-23 DIAGNOSIS — I35.0 NONRHEUMATIC AORTIC VALVE STENOSIS: ICD-10-CM

## 2018-04-23 PROCEDURE — 99214 OFFICE O/P EST MOD 30 MIN: CPT | Performed by: INTERNAL MEDICINE

## 2018-05-01 ENCOUNTER — HOSPITAL ENCOUNTER (OUTPATIENT)
Dept: OTHER | Age: 83
Discharge: HOME | End: 2018-05-31
Attending: INTERNAL MEDICINE | Admitting: INTERNAL MEDICINE

## 2018-05-16 ENCOUNTER — ANTI-COAG VISIT (OUTPATIENT)
Dept: PHARMACY | Facility: CLINIC | Age: 83
End: 2018-05-16

## 2018-05-16 LAB — INTERNATIONAL NORMALIZATION RATIO, POC: 2.4

## 2018-05-24 ENCOUNTER — HOSPITAL ENCOUNTER (OUTPATIENT)
Dept: NON INVASIVE DIAGNOSTICS | Age: 83
Discharge: HOME | End: 2018-05-24
Attending: INTERNAL MEDICINE | Admitting: INTERNAL MEDICINE

## 2018-05-24 DIAGNOSIS — I48.20 CHRONIC ATRIAL FIBRILLATION (HCC): ICD-10-CM

## 2018-05-24 DIAGNOSIS — N40.0 BENIGN PROSTATIC HYPERPLASIA, UNSPECIFIED WHETHER LOWER URINARY TRACT SYMPTOMS PRESENT: Primary | ICD-10-CM

## 2018-05-25 RX ORDER — TAMSULOSIN HYDROCHLORIDE 0.4 MG/1
CAPSULE ORAL
Qty: 30 CAPSULE | Refills: 5 | Status: SHIPPED | OUTPATIENT
Start: 2018-05-25 | End: 2018-10-22 | Stop reason: SDUPTHER

## 2018-06-01 ENCOUNTER — HOSPITAL ENCOUNTER (OUTPATIENT)
Dept: OTHER | Age: 83
Discharge: HOME | End: 2018-06-30
Attending: INTERNAL MEDICINE | Admitting: INTERNAL MEDICINE

## 2018-06-11 ENCOUNTER — ANTI-COAG VISIT (OUTPATIENT)
Dept: PHARMACY | Facility: CLINIC | Age: 83
End: 2018-06-11

## 2018-06-11 DIAGNOSIS — Z95.2 S/P AVR: ICD-10-CM

## 2018-06-11 DIAGNOSIS — I35.9 AORTIC VALVE DISORDER: ICD-10-CM

## 2018-06-11 LAB — INTERNATIONAL NORMALIZATION RATIO, POC: 2.6

## 2018-06-11 RX ORDER — RISPERIDONE 0.5 MG/1
0.5 TABLET, FILM COATED ORAL DAILY
Qty: 90 TABLET | Refills: 1 | Status: SHIPPED | OUTPATIENT
Start: 2018-06-11 | End: 2018-12-04 | Stop reason: SDUPTHER

## 2018-06-13 DIAGNOSIS — N39.41 URGE INCONTINENCE OF URINE: ICD-10-CM

## 2018-07-01 ENCOUNTER — HOSPITAL ENCOUNTER (OUTPATIENT)
Dept: OTHER | Age: 83
Discharge: HOME | End: 2018-07-01
Attending: INTERNAL MEDICINE | Admitting: INTERNAL MEDICINE

## 2018-07-09 ENCOUNTER — ANTI-COAG VISIT (OUTPATIENT)
Dept: PHARMACY | Facility: CLINIC | Age: 83
End: 2018-07-09

## 2018-07-09 DIAGNOSIS — I35.9 AORTIC VALVE DISORDER: ICD-10-CM

## 2018-07-09 DIAGNOSIS — Z95.2 S/P AVR: ICD-10-CM

## 2018-07-09 LAB — INTERNATIONAL NORMALIZATION RATIO, POC: 3.1

## 2018-07-13 DIAGNOSIS — F03.90 DEMENTIA WITHOUT BEHAVIORAL DISTURBANCE, UNSPECIFIED DEMENTIA TYPE: ICD-10-CM

## 2018-07-13 RX ORDER — MEMANTINE HYDROCHLORIDE 5 MG/1
5 TABLET ORAL DAILY
Qty: 30 TABLET | Refills: 3 | Status: SHIPPED | OUTPATIENT
Start: 2018-07-13 | End: 2018-11-12 | Stop reason: SDUPTHER

## 2018-07-16 ENCOUNTER — ANTI-COAG VISIT (OUTPATIENT)
Dept: PHARMACY | Age: 83
End: 2018-07-16
Payer: MEDICARE

## 2018-07-16 ENCOUNTER — OFFICE VISIT (OUTPATIENT)
Dept: INTERNAL MEDICINE | Age: 83
End: 2018-07-16

## 2018-07-16 VITALS
WEIGHT: 162 LBS | DIASTOLIC BLOOD PRESSURE: 70 MMHG | OXYGEN SATURATION: 97 % | BODY MASS INDEX: 26.15 KG/M2 | HEART RATE: 71 BPM | SYSTOLIC BLOOD PRESSURE: 118 MMHG

## 2018-07-16 DIAGNOSIS — I35.9 AORTIC VALVE DISORDER: ICD-10-CM

## 2018-07-16 DIAGNOSIS — G30.1 LATE ONSET ALZHEIMER'S DISEASE WITHOUT BEHAVIORAL DISTURBANCE (HCC): Primary | ICD-10-CM

## 2018-07-16 DIAGNOSIS — Z95.2 S/P AVR: ICD-10-CM

## 2018-07-16 DIAGNOSIS — E78.00 PURE HYPERCHOLESTEROLEMIA: ICD-10-CM

## 2018-07-16 DIAGNOSIS — F02.80 LATE ONSET ALZHEIMER'S DISEASE WITHOUT BEHAVIORAL DISTURBANCE (HCC): Primary | ICD-10-CM

## 2018-07-16 DIAGNOSIS — I10 ESSENTIAL HYPERTENSION, BENIGN: ICD-10-CM

## 2018-07-16 LAB
A/G RATIO: 1.6 (ref 1.1–2.2)
ALBUMIN SERPL-MCNC: 4.2 G/DL (ref 3.4–5)
ALP BLD-CCNC: 125 U/L (ref 40–129)
ALT SERPL-CCNC: 25 U/L (ref 10–40)
ANION GAP SERPL CALCULATED.3IONS-SCNC: 15 MMOL/L (ref 3–16)
AST SERPL-CCNC: 31 U/L (ref 15–37)
BASOPHILS ABSOLUTE: 0.1 K/UL (ref 0–0.2)
BASOPHILS RELATIVE PERCENT: 0.6 %
BILIRUB SERPL-MCNC: 0.9 MG/DL (ref 0–1)
BUN BLDV-MCNC: 15 MG/DL (ref 7–20)
CALCIUM SERPL-MCNC: 9.5 MG/DL (ref 8.3–10.6)
CHLORIDE BLD-SCNC: 104 MMOL/L (ref 99–110)
CO2: 25 MMOL/L (ref 21–32)
CREAT SERPL-MCNC: 0.9 MG/DL (ref 0.8–1.3)
EOSINOPHILS ABSOLUTE: 0.4 K/UL (ref 0–0.6)
EOSINOPHILS RELATIVE PERCENT: 5 %
GFR AFRICAN AMERICAN: >60
GFR NON-AFRICAN AMERICAN: >60
GLOBULIN: 2.6 G/DL
GLUCOSE BLD-MCNC: 182 MG/DL (ref 70–99)
HCT VFR BLD CALC: 42.1 % (ref 40.5–52.5)
HEMOGLOBIN: 14.2 G/DL (ref 13.5–17.5)
INTERNATIONAL NORMALIZATION RATIO, POC: 2.6
LYMPHOCYTES ABSOLUTE: 1 K/UL (ref 1–5.1)
LYMPHOCYTES RELATIVE PERCENT: 11.5 %
MCH RBC QN AUTO: 33.8 PG (ref 26–34)
MCHC RBC AUTO-ENTMCNC: 33.8 G/DL (ref 31–36)
MCV RBC AUTO: 100 FL (ref 80–100)
MONOCYTES ABSOLUTE: 1 K/UL (ref 0–1.3)
MONOCYTES RELATIVE PERCENT: 11.2 %
NEUTROPHILS ABSOLUTE: 6.5 K/UL (ref 1.7–7.7)
NEUTROPHILS RELATIVE PERCENT: 71.7 %
PDW BLD-RTO: 13.9 % (ref 12.4–15.4)
PLATELET # BLD: 203 K/UL (ref 135–450)
PMV BLD AUTO: 9.7 FL (ref 5–10.5)
POTASSIUM SERPL-SCNC: 4.2 MMOL/L (ref 3.5–5.1)
RBC # BLD: 4.21 M/UL (ref 4.2–5.9)
SODIUM BLD-SCNC: 144 MMOL/L (ref 136–145)
TOTAL PROTEIN: 6.8 G/DL (ref 6.4–8.2)
WBC # BLD: 9 K/UL (ref 4–11)

## 2018-07-16 PROCEDURE — 99213 OFFICE O/P EST LOW 20 MIN: CPT | Performed by: INTERNAL MEDICINE

## 2018-07-16 PROCEDURE — 85610 PROTHROMBIN TIME: CPT

## 2018-07-16 PROCEDURE — 99211 OFF/OP EST MAY X REQ PHY/QHP: CPT

## 2018-07-16 RX ORDER — ATORVASTATIN CALCIUM 40 MG/1
40 TABLET, FILM COATED ORAL DAILY
Qty: 90 TABLET | Refills: 2 | Status: SHIPPED | OUTPATIENT
Start: 2018-07-16 | End: 2019-05-07 | Stop reason: SDUPTHER

## 2018-07-16 NOTE — PROGRESS NOTES
ANTICOAGULATION SERVICE    Casie Brown is a 80 y.o. male with PMHx significant for AVR (2012), HTN, HLD who presents to clinic 7/16/2018 for anticoagulation monitoring and adjustment.     Anticoagulation Indication(s):  Heart Valve Replacement - bioprosthetic porcine AVR    Referring Physician:  Dr. Antonietta Phelps  Goal INR Range:  2-3  Duration of Anticoagulation Therapy:  Indefinite  Time of day dose taken:  PM  Product patient has at home:  warfarin 5 mg (peach)    Lab Results   Component Value Date    RBC 4.14 (L) 02/19/2018    HGB 14.1 02/19/2018    HCT 41.2 02/19/2018    MCV 99.5 02/19/2018    MCH 34.1 (H) 02/19/2018    MPV 9.7 02/19/2018    RDW 13.8 02/19/2018     02/19/2018     INR Summary                            Warfarin regimen (mg)  Date INR   A/P    Sun Mon Tue Wed Thu Fri Sat Mg/wk  7/16 2.6 At goal, no change  5 2.5 5 2.5 5 2.5 5 27.5  7/9 3.1 Above goal, continue  5 2.5 5 2.5 5 2.5 5 27.5  6/11 2.6 At goal, no change  5 2.5 5 2.5 5 2.5 5 27.5  5/16 2.4 At goal, no change  5 2.5 5 2.5 5 2.5 5 27.5  4/16 2.0 At goal, no change  5 2.5 5 2.5 5 2.5 5 27.5  3/19 2.4 At goal, no change  5 2.5 5 2.5 5 2.5 5 27.5  3/5 3.4 Above goal, decrease  5 2.5 5 2.5 5 2.5 5 27.5  2/19 2.9 At goal, no change  5 2.5 5 5 5 2.5 5 30    Patient History:  Recent hospitalizations/HC visits -7/16 Dr. Antonietta Phelps for chronic conditions: no med changes  -Patient was previously enrolled in Coumadin clinic at [facility]   Recent medication changes None reported today   Medications taken regularly that may interact with warfarin or alter INR ASA 81 mg, Co Q-10, fish oil   Warfarin dose taken as prescribed Yes - uses pillbox filled by wife, Anthony Padilla   Signs/symptoms of bleeding No h/o major bleeding reported   Vitamin K intake -Normally has ~0 servings of green, leafy vegetables per week  -Drinks Ensure and V8 both daily   Recent vomiting/diarrhea/fever, changes in weight or activity level None reported   Tobacco or alcohol use Patient

## 2018-07-16 NOTE — PROGRESS NOTES
Outpatient Note for established Patient - regular Visit    History Obtained From:  patient, electronic medical record  Is the patient new to me ? - No    HISTORY OF PRESENT ILLNESS:   The patient is a 80 y.o. male who is here today for :  1) Hypertension: BP preserved without medications    2) HLD : on Lipitor 40 mg , controlled, no reported side effects    3) Dementia   No behavioral changes   No agitations  Per wife: worsening recently     Past Medical History:        Diagnosis Date    Aortic stenosis     Arthritis     Dementia     Hyperlipidemia     Hypertension     Kidney stones     Pneumonia     distant past    SOB (shortness of breath)        Social History:   TOBACCO:   reports that he has never smoked. He has never used smokeless tobacco.    ETOH:   reports that he drinks about 6.6 oz of alcohol per week . Current Medications:    Prior to Admission medications    Medication Sig Start Date End Date Taking? Authorizing Provider   atorvastatin (LIPITOR) 40 MG tablet Take 1 tablet by mouth daily 7/16/18  Yes Monserrat Feliz MD   memantine (NAMENDA) 5 MG tablet Take 1 tablet by mouth daily 7/13/18  Yes Monserrat Feliz MD   Mirabegron ER 25 MG TB24 Take 1 tablet by mouth daily 6/13/18  Yes Monserrat Feliz MD   risperiDONE (RISPERDAL) 0.5 MG tablet Take 1 tablet by mouth daily 6/11/18  Yes Monserrat Feliz MD   tamsulosin (FLOMAX) 0.4 MG capsule TAKE ONE CAPSULE BY MOUTH DAILY 5/25/18  Yes Monserrat Feliz MD   alendronate (FOSAMAX) 70 MG tablet TAKE 1 TABLET BY MOUTH ONCE WEEKLY BEFORE BREAKFAST, ON AN EMPTY STOMACH: REMAIN UPRIGHT FOR 30 MINUTES 2/28/18  Yes Monserrat Feliz MD   digoxin (LANOXIN) 125 MCG tablet TAKE ONE TABLET BY MOUTH DAILY 1/11/18  Yes Monserrat Feliz MD   warfarin (COUMADIN) 5 MG tablet Take 2.5 mg on Mondays and Friday. Take 5 mg the rest of the days.  11/21/17  Yes Monserrat Feliz MD   Omega-3 Fatty Acids (FISH OIL) 1000 MG CPDR Take 1 capsule by mouth daily. Yes Historical Provider, MD   acetaminophen (TYLENOL) 325 MG tablet Take 2 tablets by mouth every 6 hours as needed for Pain or Fever (For mild pain level 1-3 or for fever > 100.5). 6/28/12  Yes YUNIOR Luna - CNP   aspirin 81 MG EC tablet Take 81 mg by mouth daily. Yes Historical Provider, MD   Cholecalciferol (VITAMIN D) 1000 UNIT TABS Take 2 tablets by mouth daily. 6/10/10  Yes Historical Provider, MD   Coenzyme Q10 (CO Q-10 PO) Take 1 tablet by mouth daily. 6/10/10  Yes Historical Provider, MD   Multiple Vitamin (MULTIVITAMIN PO) Take 1 tablet by mouth daily. 6/10/10  Yes Historical Provider, MD       REVIEW OF SYSTEMS:  Review of Systems   Constitutional: Negative for activity change, appetite change, chills, fever and unexpected weight change. HENT: Positive for hearing loss. Eyes: Negative for visual disturbance. Respiratory: Negative for chest tightness and shortness of breath. Cardiovascular: Negative for chest pain. Gastrointestinal: Negative for abdominal pain, nausea and vomiting. Skin: Negative for rash. Allergic/Immunologic: Negative for immunocompromised state. Neurological: Negative for headaches. Psychiatric/Behavioral: Negative for agitation, behavioral problems, dysphoric mood and suicidal ideas. Worsening cognition per wife          Physical Exam:      Vitals: /70 (Site: Left Arm, Position: Sitting, Cuff Size: Small Adult)   Pulse 71   Wt 162 lb (73.5 kg)   SpO2 97%   BMI 26.15 kg/m²     Body mass index is 26.15 kg/m². Wt Readings from Last 3 Encounters:   07/16/18 162 lb (73.5 kg)   04/23/18 165 lb (74.8 kg)   04/16/18 166 lb (75.3 kg)     Physical Exam   Constitutional: He appears well-developed and well-nourished. No distress. HENT:   Head: Normocephalic and atraumatic. Mouth/Throat: Oropharynx is clear and moist. No oropharyngeal exudate. Eyes: Conjunctivae and EOM are normal. Right eye exhibits no discharge.  Left eye exhibits no discharge. No scleral icterus. Neck: Normal range of motion. Neck supple. No thyromegaly present. Cardiovascular: Normal rate and normal heart sounds. No murmur heard. Pulmonary/Chest: Effort normal and breath sounds normal. No respiratory distress. He has no wheezes. He has no rales. Abdominal: Soft. He exhibits no distension. There is no tenderness. Musculoskeletal: He exhibits no deformity. Lymphadenopathy:        Head (right side): No submental and no submandibular adenopathy present. Head (left side): No submental and no submandibular adenopathy present. He has no cervical adenopathy. Right cervical: No superficial cervical and no deep cervical adenopathy present. Left cervical: No superficial cervical and no deep cervical adenopathy present. Neurological: He is alert. He has normal reflexes. He exhibits normal muscle tone. GCS eye subscore is 4. GCS verbal subscore is 5. GCS motor subscore is 6. Skin: No rash noted. He is not diaphoretic. Psychiatric: He has a normal mood and affect. His mood appears not anxious. He is slowed. He is not agitated and not aggressive. Cognition and memory are impaired. He does not exhibit a depressed mood. Affect a little flat           Assessment/Plan:   Ced Gaona was seen today for hypertension and dementia. Diagnoses and all orders for this visit:    Late onset Alzheimer's disease without behavioral disturbance  He is on Memantin   Pleasant and no agitation  - no changes in plan for now     S/P AVR  On Coumadin   Followed by Coumadin clinic  INR well controlled    Pure hypercholesterolemia  Keep Lipitor 40 mg     Essential hypertension, benign- not active, off meds   -     CBC Auto Differential; Future  -     Comprehensive Metabolic Panel; Future    Other orders  -     atorvastatin (LIPITOR) 40 MG tablet;  Take 1 tablet by mouth daily    - Patient was encouraged to call the office (and ask to see me) or be seen by other provider for any worsening or lack    of improvement in his symptoms.       - Pt was asked to schedule an appointment in 3 months, and to let me know of any scheduling difficulties. Additional patients instructions (if given): There are no Patient Instructions on file for this visit.     Stephanie Jaimes M.D.   8/8/2018, 12:28 PM

## 2018-08-03 ENCOUNTER — ANTI-COAG VISIT (OUTPATIENT)
Dept: PHARMACY | Age: 83
End: 2018-08-03
Payer: MEDICARE

## 2018-08-03 DIAGNOSIS — I35.9 AORTIC VALVE DISORDER: ICD-10-CM

## 2018-08-03 DIAGNOSIS — Z95.2 S/P AVR: ICD-10-CM

## 2018-08-03 LAB — INTERNATIONAL NORMALIZATION RATIO, POC: 2.9

## 2018-08-03 PROCEDURE — 99211 OFF/OP EST MAY X REQ PHY/QHP: CPT

## 2018-08-03 PROCEDURE — 85610 PROTHROMBIN TIME: CPT

## 2018-08-03 NOTE — PROGRESS NOTES
reported   Tobacco or alcohol use Patient denies both   Upcoming surgeries or procedures None reported     Assessment/Plan:  Patient's INR was therapeutic today (2.9). Cabrerariestela Johansen (wife) endorses continued overall poor appetite, but does state pt drinks Ensure and V8 daily. Patient was instructed to continue warfarin 2.5 mg on MWF, and 5 mg all other days. Repeat INR in 3.5 weeks to coordinate with wife. Patient was reminded to maintain consistent vitamin K intake and call with any bleeding, medication changes, or fever/vomiting/diarrhea. Patient understands dosing directions and information discussed. Dosing schedule and follow up appointment given to patient. Progress note routed to referring physician's office. Patient acknowledges working in consult agreement with pharmacist as referred by his/her physician. Next Clinic Appointment:  8/27    Please call [facility] at [phone] with any questions. Thanks!   Bevelry Payton, HannaD, BCPS  Medication Management Clinic   [facility] Ph: [phone]  [facility] Ph: [phone]  8/3/2018 1:21 PM

## 2018-08-03 NOTE — PROGRESS NOTES
ANTICOAGULATION SERVICE    Jude Vasquez is a 80 y.o. male with PMHx significant for AVR (2012), HTN, HLD who presents to clinic 2/19/2018 for anticoagulation monitoring and adjustment. Anticoagulation Indication(s):  Heart Valve Replacement - AVR    Referring Physician:  Dr. Francia Villalobos  Goal INR Range:  2-3  Duration of Anticoagulation Therapy:  Indefinite  Time of day dose taken:  PM  Product patient has at home:  warfarin 5 mg (peach)    Recent INR Results:  Lab Results   Component Value Date    INR 2.9 01/22/2018    INR 4.5 01/05/2018    INR 2.5 12/08/2017    INR 2.89 (H) 11/22/2017       INR Summary                            Warfarin regimen (mg)  Date INR   A/P    Sun Mon Tue Wed Thu Fri Sat Mg/wk  2/19 2.9 At goal, no change  5 2.5 5 5 5 2.5 5 30    Patient History:  Recent hospitalizations/HC visits Patient was previously enrolled in Coumadin clinic at [place] [address] medication changes None reported - med list reviewed with patient at first visit and is accurate   Medications taken regularly that may interact with warfarin or alter INR ASA 81 mg, Co Q-10, fish oil   Warfarin dose taken as prescribed Uses pillbox filled by wife, Yanci Lees   Signs/symptoms of bleeding No h/o major bleeding reported   Vitamin K intake -Normally has ~0 servings of green, leafy vegetables per week  -Drinks Ensure and V8 both daily   Recent vomiting/diarrhea/fever, changes in weight or activity level None reported   Tobacco or alcohol use Patient denies both   Upcoming surgeries or procedures None reported     Assessment/Plan:  Patient's INR was therapeutic today (2.9). Patient was instructed to continue warfarin 5 mg daily, except 2.5 mg on Mon/Fri. Repeat INR in 2 weeks. Patient was reminded to maintain consistent vitamin K intake and call with any bleeding, medication changes, or fever/vomiting/diarrhea.     As it was the first visit to [facility] for Jude Vasquez, the following was also reviewed [facility]       PATIENT INFORMATION SHEET: 12 MONTH WELL CHILD EXAM      Salvador  8/3/2018    Temperature 97.2 °F (36.2 °C), temperature source Temporal Artery, height 29.75\" (75.6 cm), weight 11 kg, head circumference 46 cm (18.11\").    NUTRITION:  • May transition to cow’s milk  o whole milk for brain development for first 2 years  o Limit to 32 oz per day  • May also continue breastfeeding or providing breastmilk:  o mutual weaning often occurs between 12 and 18 months with gradual replacement of one feed each week with solids or milk  o Wean from bottle  - No bottle at bedtime (Offer water in bottle and milk in sippy cup)   • Continue to advance baby foods/table foods as tolerated  o Watch for choking hazards such as:  nuts, berries, sliced hot dogs, peanut butter, gum  • Anticipate the “picky” eater stage as growth slows  • Poly-Vi-Sol with iron 1 dropper daily.    GROWTH AND DEVELOPMENT:  Your child should be able to:  • Walk with help or alone, throw objects, bang blocks together, look for hidden objects, imitate actions, will respond to name and come when called, have 1-3 words (“mama,” “tano,” and 1-2 other words)    ANTICIPATORY GUIDANCE:  • Walking, running  • May start to crawl up stairs  • Will begin to see imitation (pretend play), autonomy  • Limit TV and video time to 2 hours or less a day  • Reading and sibling play is important  o “Pat-a-cake” and “Peek-a-guadalupe”  o Generally this age does not understand sharing  • Discipline:  o Ignore unwanted behavior; remove and re-direct from situation  o No need to scold unless truly harmful situation  o NEVER hit your child  o Praise desired behavior; be consistent    SAFETY:  • Back to sleep; firm mattress; no pillows or blankets; crib slots < 2 ? in  • Try to keep room temp between 68-74° F for comfort  • Carseat must be rear-facing in back seat until 2 years of age  (www.carseat.org )  • Never leave infant alone or unsupervised; watch for choking  hazards (small toys)  o Monitor areas of standing water such as buckets, toilet/tub, pools  o Watch for dangling cords or hot liquid that can be pulled off shelf or spilled while carrying your infant  o Outlet covers, childproof cabinets (keep medication and solvents out of reach), and gate stairs  o Do not store household /solvents in food containers (Example: anti-freeze in a Gatorade bottle)  • Have working smoke detectors;  Place fire extinguishers in your home (bedroom, kitchen, basement); have a fire ladder on 2nd story and rescue plan in place  • Water heater should be set less than 120° F  • Sunscreen with SPF 45  (do not use sunscreen/insect repellant combo); apply 30 minutes prior to going outside, then every 2 hours; hats are encouraged  • Insect repellant with less than 10% DEET okay once or twice per day (apply mostly to shoes and clothes; avoid hands and eyes/face)  • Flying with your infant is okay; no special precautions needed (see http://www.aap.org/advocacy/releases/travelsafetytips.cfm for details)  • Avoid exposing Salvador to cigarette smoke due to increased risk for infections    HEALTH:  • Immunizations:  • See Handouts or WIR (https://www.dhfswir.org/[place]/portalHeader.do) for more information  o After vaccination your infant may experience:  - Irritability, redness or lump at injection site, fever  • Fever:  o Temperature of 101° F or greater is a fever  o Assess for specific symptoms & signs of dehydration; call your doctor if needed (See fever-reducer dosing below)  • Teething:  o Increased drooling, fussiness  o Wipe gums/teeth with washcloth (no toothpaste)  o Fluoride supplements only if you live in area with low fluoride, have well water, or use bottled water (Ask your doctor)  • Blood will be drawn today for lead/iron testing      FEVER REDUCERS:  INFANT and CHILDREN'S (SAME DOSE) Tylenol/Acetaminophen  (160 MG/5 mL)  Child’s Weight: Dose:  12 - 17 pounds:   80 mg (2.5 mL  (1/2  with the patient in detail:  · Reason for and intended duration of therapy  · INR goal and frequency of INR monitoring  · Medication Interactions: Call clinic if new any new medications are started--especially antibiotics. Avoid NSAIDs for pain. Use Tylenol instead. · Food-Drug Interactions: Foods high in vitamin K can change the effects of warfarin (decrease INR)--Stressed consistency with these foods. Reviewed a list of high vitamin K foods- mostly leafy green vegetables. · Side effects: May bruise easier and small cuts may take longer to clot than usual.  Seek medical attention for any cuts that won't stop bleeding or falls involving head injury. Any blood in the urine or stool should be reported immediately. · Effect of alcohol and tobacco on INR  · Call the clinic with planned surgeries or procedures  · An information pamphlet \"A Guide to Your Warfarin Therapy\" was provided to the patient    Next Clinic Appointment:  3/5    Please call [facility] at [phone] with any questions. Patient understands dosing directions and information discussed. Dosing schedule and follow up appointment given to patient. Progress note routed to referring physician's office. Patient acknowledges working in consult agreement with pharmacist as referred by his/her physician. Thanks! Kendrick Jain.  Rosaura Sumner, PharmD  [facility]  Ph: [phone]  2/19/2018 12:56 PM Teaspoon))  18 - 23 pounds:   120 mg (3.75 mL (3/4 Teaspoon))  24 - 36 pounds:     160 mg (5 mL ( 1 Teaspoon))    Beginning at 6 months of age, Children's Ibuprofen (e.g., Advil or Motrin) may be given every six hours as needed for pain or fever.    INFANT Ibuprofen liquid (40 mg/mL)  Child’s Weight: Dose:  12 - 17 pounds:   50 mg (1.25 mL)  18 - 23 pounds:   75 mg (1.875 mL)  24 - 36 pounds:     100 mg (2.5 mL)    CHILDREN'S Ibuprofen liquid (100 mg/5 mL)  Child’s Weight: Dose:  12 - 17 pounds:   50 mg (2.5 mL (1/2 Teaspoon))  18 - 23 pounds: 75 mg (3.75 mL (3/4 Teaspoon))  24 - 36 pounds:   100 mg (5 mL ( 1 Teaspoon))      HELPFUL RESOURCES:  • APPOINTMENTS:  Call Central Scheduling at [phone]  • AFTER HOURS (for urgent matters that cannot wait until the following business day):  Call your doctor’s office phone number  and you will be transferred to the answering service.  Your call will be returned by the Pediatrician who is on call for your doctor.  • Important Phone Numbers:  o Parent Helpline:  [phone]  o Poison Control:  1-869.703.6183  • Websites:  o American Academy of Pediatrics website:  www.aap.org  • Books (found at www.aap.org or in bookstores)  o Caring for your Baby and Young Child by Taz Aguila M.D        NEXT CHECK-UP:  15 MONTHS    Patient Education     Breath-Holding Spells (Child)  A breath-holding spell is frightening for a parent to witness; however it is not harmful to your child. These spells can occur as early as 6 months old through 6 years of age. They are most commonly seen in toddlers from 1 to 3 years of age. Newborns rarely have breath-holding spells.  Episodes of breath-holding spells can range from one to several times a day, or happen only once in a while. Breath-holding spells often occur during a temper tantrum. Be reassured, though, that your child is not doing this on purpose, and these spells will not be harmful or cause permanent effects. Don’t hit or punish  the child during or after the tantrum.  Half of children get over this by age 4, and almost all (more than 80%) are over these spells by age 8 years of age. There may be a history of other family members having this same problem as babies. Some children continue to have breath-holding spells as adults.  Cause and types  Although it is was once thought of as a willful act by the child with emotional problems, it is not. It is now believed to be a reflex response of the involuntary nervous system triggered by physical pain or emotionally upsetting events.  There are two types of breath-holding spells. Both are involuntary (not under the child’s control). However, they have different causes and characteristics:  · Cyanotic (the most common type)  ¨ Triggered by temper tantrums, being scolded, or an upsetting event.  ¨ May include a brief seizure  ¨ May respond to treatment iron with iron supplements.  · Pallid (less common)  ¨ Triggered by physical pain, minor injury, or frightening event.  ¨ Heart rate is much slower than normal.  Signs and symptoms  During a cyanotic breath-holding spell, after an upset or minor injury, the following may occur:  · A crying spell, which may be short or long, usually occurs.  · There is a silence as breathing stops (usually after the child exhales).  · Skin color (especially around the lips) changes to red-purple, blue, or mottled.  · The child loses consciousness.  · After a few seconds the child begins breathing normally and becomes conscious again.  During a pallid breath-holding spell, after a child is startled or has a minor injury, the following may occur:  · The child may or may not cry.  · The child stops breathing.  · He or she then loses consciousness.  · Skin color turns pale and the child may become sweaty.  · The child becomes limp.  · The heart rate increases suddenly and the child begins to breathe again.  · The child becomes conscious again.  Please note: The child may  become limp and fall to the ground. Muscles may jerk or twitch. There may even be general body stiffening. After the spell is over, your child may be drowsy for a few minutes before returning to usual activities.  Home care  · If your child has a temper tantrum, make reasonable efforts to calm him or her. If this doesn't work, let the tantrum happen but prevent your child from injuring him- or herself.  · If the child has a breath-holding spell and goes limp, turn the child on his or her side to prevent choking on spit or vomit. If the child was eating when the breath-holding spell occurred, be sure the mouth is free of food.  · Breath-holding spells can be more frightening to the parent than the child. Do not show extreme worry or fear to the child.  · A breath-holding spell can look like a seizure or fainting. Please contact your child's healthcare provider immediately to describe what happened during this event. It is very important to tell your child's healthcare provider the following information:  ¨ What did the event look like--a seizure or breath-holding spell?  ¨ Did your child turn blue? If so, when did your child's color change? Was it before, during, or after he or she began shaking or their muscles began to jerk or twitch?  ¨ Did your child's color change before he or she went limp or lost consciousness?  Remember: Watch closely each time your child has a spell. Note whether the spell is the same, different, or worse as previous ones. (For example, did it last longer? Are spells becoming more frequent? ) To help your memory, please write down what happened each time.  Follow-up care  Follow up with your health care provider, or as advised. Talk to your health care provider if tantrums become longer or more frequent or intense. Consult your health care provider if you need more advice on how to manage a tantrum without losing control yourself.  When to seek medical advice  Call your health care provider  right away if any of these occur:  · Breath-holding spells become worse or different than usual.  · Shaking movements during a breath-holding spell last longer than usual.  · Limpness and color change or shaking movements occur while your child is sleeping.  · Shaking movements occur before your child turns blue. (This may be a seizure.)  · Skin shows color changes (blue, red-purple, pale) that continue after the child wakes up.  Call 911  Call 911 if any of these occur:  · Your child has trouble breathing or stops breathing.  · Your child shows confusion or no response when you speak to them.  · Your child has trouble waking up.  · Your child loses consciousness for longer than 1 minute.  · Your child has a seizure that lasts longer than 1 minute.  © 9575-7845 [employer] [address] [place] [address], [place] [address]. All rights reserved. This information is not intended as a substitute for professional medical care. Always follow your healthcare professional's instructions.

## 2018-08-08 ASSESSMENT — ENCOUNTER SYMPTOMS
SHORTNESS OF BREATH: 0
VOMITING: 0
NAUSEA: 0
CHEST TIGHTNESS: 0
ABDOMINAL PAIN: 0

## 2018-08-14 DIAGNOSIS — N39.41 URGE INCONTINENCE OF URINE: ICD-10-CM

## 2018-08-27 ENCOUNTER — ANTI-COAG VISIT (OUTPATIENT)
Dept: PHARMACY | Age: 83
End: 2018-08-27
Payer: MEDICARE

## 2018-08-27 DIAGNOSIS — Z95.2 S/P AVR: ICD-10-CM

## 2018-08-27 DIAGNOSIS — I35.9 AORTIC VALVE DISORDER: ICD-10-CM

## 2018-08-27 LAB — INTERNATIONAL NORMALIZATION RATIO, POC: 3.3

## 2018-08-27 PROCEDURE — 85610 PROTHROMBIN TIME: CPT

## 2018-08-27 PROCEDURE — 99211 OFF/OP EST MAY X REQ PHY/QHP: CPT

## 2018-08-30 DIAGNOSIS — I35.9 AORTIC VALVE DISORDER: Primary | ICD-10-CM

## 2018-08-30 RX ORDER — WARFARIN SODIUM 5 MG/1
TABLET ORAL
Qty: 30 TABLET | Refills: 5 | Status: SHIPPED | OUTPATIENT
Start: 2018-08-30 | End: 2019-02-08 | Stop reason: SDUPTHER

## 2018-09-10 ENCOUNTER — ANTI-COAG VISIT (OUTPATIENT)
Dept: PHARMACY | Age: 83
End: 2018-09-10
Payer: MEDICARE

## 2018-09-10 DIAGNOSIS — I35.9 AORTIC VALVE DISORDER: ICD-10-CM

## 2018-09-10 DIAGNOSIS — Z95.2 S/P AVR: ICD-10-CM

## 2018-09-10 LAB — INTERNATIONAL NORMALIZATION RATIO, POC: 2.9

## 2018-09-10 PROCEDURE — 85610 PROTHROMBIN TIME: CPT

## 2018-09-10 PROCEDURE — 99211 OFF/OP EST MAY X REQ PHY/QHP: CPT

## 2018-09-24 ENCOUNTER — ANTI-COAG VISIT (OUTPATIENT)
Dept: PHARMACY | Age: 83
End: 2018-09-24
Payer: MEDICARE

## 2018-09-24 DIAGNOSIS — I35.9 AORTIC VALVE DISORDER: ICD-10-CM

## 2018-09-24 DIAGNOSIS — Z95.2 S/P AVR: ICD-10-CM

## 2018-09-24 LAB — INTERNATIONAL NORMALIZATION RATIO, POC: 3.2

## 2018-09-24 PROCEDURE — 99211 OFF/OP EST MAY X REQ PHY/QHP: CPT

## 2018-09-24 PROCEDURE — 85610 PROTHROMBIN TIME: CPT

## 2018-09-24 NOTE — PROGRESS NOTES
ANTICOAGULATION SERVICE    Po Edwards is a 80 y.o. male with PMHx significant for AVR (2012), HTN, HLD who presents to clinic 9/24/2018 for anticoagulation monitoring and adjustment.     Anticoagulation Indication(s):  Heart Valve Replacement - bioprosthetic porcine AVR    Referring Physician:  Dr. Sangeeta Maya  Goal INR Range:  2-3  Duration of Anticoagulation Therapy:  Indefinite  Time of day dose taken:  PM  Product patient has at home:  warfarin 5 mg (peach)    Lab Results   Component Value Date    RBC 4.21 07/16/2018    HGB 14.2 07/16/2018    HCT 42.1 07/16/2018    .0 07/16/2018    MCH 33.8 07/16/2018    MPV 9.7 07/16/2018    RDW 13.9 07/16/2018     07/16/2018     INR Summary                            Warfarin regimen (mg)  Date INR   A/P    Sun Mon Tue Wed Thu Fri Sat Mg/wk  9/24 3.2 Above goal, decrease  5 2.5 2.5 2.5 5 2.5 2.5 22.5  9/10 2.9 At goal, no change  5 2.5 2.5 2.5 5 2.5 5 25  8/27 3.3 Above goal, decrease  5 2.5 2.5 2.5 5 2.5 5 25  8/3 2.9 At goal, no change  5 2.5 5 2.5 5 2.5 5 27.5  7/16 2.6 At goal, no change  5 2.5 5 2.5 5 2.5 5 27.5  7/9 3.1 Above goal, continue  5 2.5 5 2.5 5 2.5 5 27.5  6/11 2.6 At goal, no change  5 2.5 5 2.5 5 2.5 5 27.5  5/16 2.4 At goal, no change  5 2.5 5 2.5 5 2.5 5 27.5  4/16 2.0 At goal, no change  5 2.5 5 2.5 5 2.5 5 27.5  3/19 2.4 At goal, no change  5 2.5 5 2.5 5 2.5 5 27.5  3/5 3.4 Above goal, decrease  5 2.5 5 2.5 5 2.5 5 27.5  2/19 2.9 At goal, no change  5 2.5 5 5 5 2.5 5 30    Patient History:  Recent hospitalizations/HC visits -7/16 Dr. Sangeeta Maya for chronic conditions: no med changes  -Patient was previously enrolled in Coumadin clinic at [facility]   Recent medication changes None reported today   Medications taken regularly that may interact with warfarin or alter INR ASA 81 mg, Co Q-10, fish oil   Warfarin dose taken as prescribed Yes - uses pillbox filled by wife, Maliha Rayo   Signs/symptoms of bleeding No h/o major bleeding reported   Vitamin K intake -Normally has ~0 servings of green, leafy vegetables per week  -Drinks Ensure and V8 both daily   Recent vomiting/diarrhea/fever, changes in weight or activity level None reported   Tobacco or alcohol use Patient denies both   Upcoming surgeries or procedures None reported     Assessment/Plan:  Patient's INR was supratherapeutic today (3.2). Kim Sow (wife) endorses overall declining appetite. Patient was instructed to decrease warfarin dose to 2.5 mg daily, except 5 mg on Sun+Thu. Repeat INR in 2 weeks to coordinate with wife. Patient was reminded to maintain consistent vitamin K intake and call with any bleeding, medication changes, or fever/vomiting/diarrhea. Patient understands dosing directions and information discussed. Dosing schedule and follow up appointment given to patient. Progress note routed to referring physician's office. Patient acknowledges working in consult agreement with pharmacist as referred by his/her physician. Next Clinic Appointment:  10/8    Please call [facility] at [phone] with any questions. Thanks! Gi Baxter.  Perry Langley, Raymundo  [facility]  Ph: [phone]  9/24/2018 1:05 PM

## 2018-10-08 ENCOUNTER — ANTI-COAG VISIT (OUTPATIENT)
Dept: PHARMACY | Age: 83
End: 2018-10-08
Payer: MEDICARE

## 2018-10-08 DIAGNOSIS — I35.9 AORTIC VALVE DISORDER: ICD-10-CM

## 2018-10-08 DIAGNOSIS — Z95.2 S/P AVR: ICD-10-CM

## 2018-10-08 LAB — INTERNATIONAL NORMALIZATION RATIO, POC: 2.3

## 2018-10-08 PROCEDURE — 99211 OFF/OP EST MAY X REQ PHY/QHP: CPT

## 2018-10-08 PROCEDURE — 85610 PROTHROMBIN TIME: CPT

## 2018-10-22 DIAGNOSIS — N40.0 BENIGN PROSTATIC HYPERPLASIA, UNSPECIFIED WHETHER LOWER URINARY TRACT SYMPTOMS PRESENT: ICD-10-CM

## 2018-10-22 RX ORDER — TAMSULOSIN HYDROCHLORIDE 0.4 MG/1
CAPSULE ORAL
Qty: 30 CAPSULE | Refills: 5 | Status: SHIPPED | OUTPATIENT
Start: 2018-10-22 | End: 2019-03-22 | Stop reason: SDUPTHER

## 2018-10-31 ENCOUNTER — OFFICE VISIT (OUTPATIENT)
Dept: INTERNAL MEDICINE CLINIC | Age: 83
End: 2018-10-31
Payer: MEDICARE

## 2018-10-31 ENCOUNTER — ANTI-COAG VISIT (OUTPATIENT)
Dept: PHARMACY | Age: 83
End: 2018-10-31
Payer: MEDICARE

## 2018-10-31 VITALS
OXYGEN SATURATION: 97 % | DIASTOLIC BLOOD PRESSURE: 52 MMHG | SYSTOLIC BLOOD PRESSURE: 100 MMHG | HEART RATE: 58 BPM | BODY MASS INDEX: 25.99 KG/M2 | WEIGHT: 161 LBS

## 2018-10-31 DIAGNOSIS — Z23 NEED FOR INFLUENZA VACCINATION: ICD-10-CM

## 2018-10-31 DIAGNOSIS — Z95.2 S/P AVR: ICD-10-CM

## 2018-10-31 DIAGNOSIS — G30.1 LATE ONSET ALZHEIMER'S DISEASE WITHOUT BEHAVIORAL DISTURBANCE (HCC): ICD-10-CM

## 2018-10-31 DIAGNOSIS — I35.9 AORTIC VALVE DISORDER: ICD-10-CM

## 2018-10-31 DIAGNOSIS — E78.00 PURE HYPERCHOLESTEROLEMIA: ICD-10-CM

## 2018-10-31 DIAGNOSIS — I95.0 IDIOPATHIC HYPOTENSION: Primary | ICD-10-CM

## 2018-10-31 DIAGNOSIS — F02.80 LATE ONSET ALZHEIMER'S DISEASE WITHOUT BEHAVIORAL DISTURBANCE (HCC): ICD-10-CM

## 2018-10-31 DIAGNOSIS — I50.30 (HFPEF) HEART FAILURE WITH PRESERVED EJECTION FRACTION (HCC): ICD-10-CM

## 2018-10-31 LAB — INTERNATIONAL NORMALIZATION RATIO, POC: 2.4

## 2018-10-31 PROCEDURE — G0008 ADMIN INFLUENZA VIRUS VAC: HCPCS | Performed by: INTERNAL MEDICINE

## 2018-10-31 PROCEDURE — 90662 IIV NO PRSV INCREASED AG IM: CPT | Performed by: INTERNAL MEDICINE

## 2018-10-31 PROCEDURE — 99211 OFF/OP EST MAY X REQ PHY/QHP: CPT

## 2018-10-31 PROCEDURE — 99213 OFFICE O/P EST LOW 20 MIN: CPT | Performed by: INTERNAL MEDICINE

## 2018-10-31 PROCEDURE — 85610 PROTHROMBIN TIME: CPT

## 2018-10-31 RX ORDER — FUROSEMIDE 20 MG/1
TABLET ORAL
Qty: 30 TABLET | Refills: 3 | Status: SHIPPED | OUTPATIENT
Start: 2018-10-31 | End: 2019-04-08 | Stop reason: SDUPTHER

## 2018-10-31 ASSESSMENT — PATIENT HEALTH QUESTIONNAIRE - PHQ9
2. FEELING DOWN, DEPRESSED OR HOPELESS: 0
SUM OF ALL RESPONSES TO PHQ QUESTIONS 1-9: 0
SUM OF ALL RESPONSES TO PHQ QUESTIONS 1-9: 0
SUM OF ALL RESPONSES TO PHQ9 QUESTIONS 1 & 2: 0
1. LITTLE INTEREST OR PLEASURE IN DOING THINGS: 0

## 2018-10-31 NOTE — PROGRESS NOTES
97%   BMI 25.99 kg/m²     Body mass index is 25.99 kg/m². Wt Readings from Last 3 Encounters:   10/31/18 161 lb (73 kg)   07/16/18 162 lb (73.5 kg)   04/23/18 165 lb (74.8 kg)     Physical Exam   Constitutional: He appears well-developed and well-nourished. No distress. HENT:   Head: Normocephalic and atraumatic. Mouth/Throat: Oropharynx is clear and moist. No oropharyngeal exudate. Eyes: Conjunctivae and EOM are normal. Right eye exhibits no discharge. Left eye exhibits no discharge. No scleral icterus. Neck: Normal range of motion. Neck supple. Cardiovascular: Normal rate and normal heart sounds. No murmur heard. Pulmonary/Chest: Effort normal. No respiratory distress. He has no wheezes. He has rales (bibasilar inspiratory mild ). Abdominal: Soft. He exhibits no distension. There is no tenderness. Musculoskeletal: He exhibits edema (+1-2 b/l pitting ). He exhibits no deformity. Lymphadenopathy:        Head (right side): No submental and no submandibular adenopathy present. Head (left side): No submental and no submandibular adenopathy present. He has no cervical adenopathy. Right cervical: No superficial cervical and no deep cervical adenopathy present. Left cervical: No superficial cervical and no deep cervical adenopathy present. Neurological: He is alert. He has normal reflexes. No sensory deficit. He exhibits normal muscle tone. GCS eye subscore is 4. GCS verbal subscore is 5. GCS motor subscore is 6. Skin: No rash noted. He is not diaphoretic. Psychiatric: He has a normal mood and affect. Thought content normal. His mood appears not anxious. His affect is not blunt. His speech is not slurred. He is slowed. He is not agitated, not aggressive, not hyperactive and not combative. Thought content is not paranoid and not delusional. He does not exhibit a depressed mood.       Assessment/Plan:   Anna Woods was seen today for hypertension, memory loss and anorexia. Diagnoses and all orders for this visit:    Idiopathic hypotension  Mildly low BP. I will not stop Flomax  I will add lasix fo HFpER  And will monitor carefully BP. That may actually improve BP if cause of low BP is impaired hemodynamics from HF. (HFpEF) heart failure with preserved ejection fraction ([facility] [address]  Patient has HFpEF grade III diastolic dysfunction, NYHA III  As above, I will add low dose lasix , monitor BP carefully- discussed with the wife  -     furosemide (LASIX) 20 MG tablet; Take on Monday , Wednesday, Friday  -     CBC Auto Differential; Future  -     Comprehensive Metabolic Panel; Future    Late onset Alzheimer's disease without behavioral disturbance  Stable, supported by wofe  Is expected to get help soon through [place]    Pure hypercholesterolemia  Well controlled- no changes in medication today. S/P AVR  INR ok  No changes in meds    Need for influenza vaccination  -     INFLUENZA, HIGH DOSE, 65 YRS +, IM, PF, PREFILL SYR, 0.5ML (FLUZONE HD)    - Patient was encouraged to callthe office (and ask to see me) or be seen by other provider for any worsening or lack    of improvement in his symptoms.       - Pt was asked toschedule an appointment in 3 months, and to let me know of any scheduling difficulties. Additional patients instructions (if given):   Patient Instructions   Please check BP daily, send me results in 1 week. Also if dizziness or low blood pressure please stop the lasix and let me know.        Luma Hogue M.D.   11/1/2018, 2:10 AM

## 2018-10-31 NOTE — PROGRESS NOTES
Vaccine Information Sheet, \"Influenza - Inactivated\"  given to Masha Blue, or parent/legal guardian of  Masha Blue and verbalized understanding. Patient responses:    Have you ever had a reaction to a flu vaccine? No  Are you able to eat eggs without adverse effects? Yes  Do you have any current illness? No  Have you ever had [place] Syndrome? No    Flu vaccine given per order. Please see immunization tab.

## 2018-10-31 NOTE — PROGRESS NOTES
Yes - uses pillbox filled by wife, Molly Grandchild   Signs/symptoms of bleeding No h/o major bleeding reported   Vitamin K intake -Normally has ~0 servings of green, leafy vegetables per week  -Drinks Ensure and V8 both daily   Recent vomiting/diarrhea/fever, changes in weight or activity level None reported   Tobacco or alcohol use Patient denies both   Upcoming surgeries or procedures None reported     Assessment/Plan:  Patient's INR was therapeutic today (2.4). Molly Grandchild (wife) endorses overall declining appetite. Patient was instructed to continue warfarin 2.5 mg daily, except 5 mg on Sun+Thu. Repeat INR in 4 weeks to coordinate with PCP visit. Patient was reminded to maintain consistent vitamin K intake and call with any bleeding, medication changes, or fever/vomiting/diarrhea. Patient understands dosing directions and information discussed. Dosing schedule and follow up appointment given to patient. Progress note routed to referring physician's office. Patient acknowledges working in consult agreement with pharmacist as referred by his/her physician. Next Clinic Appointment:  11/26    Please call [facility] at [phone] with any questions. Thanks!   Simon Parish, PharmD   PGY1 Pharmacy Resident   Medication Management Clinic   Flakito Jacobson [facility] Ph: [phone]  [facility] Ph: [phone]  10/31/2018 3:21 PM

## 2018-11-01 ASSESSMENT — ENCOUNTER SYMPTOMS
CHEST TIGHTNESS: 0
SHORTNESS OF BREATH: 0
ABDOMINAL PAIN: 0
NAUSEA: 0
VOMITING: 0

## 2018-11-08 ENCOUNTER — TELEPHONE (OUTPATIENT)
Dept: INTERNAL MEDICINE CLINIC | Age: 83
End: 2018-11-08

## 2018-11-09 DIAGNOSIS — I50.30 (HFPEF) HEART FAILURE WITH PRESERVED EJECTION FRACTION (HCC): ICD-10-CM

## 2018-11-09 LAB
A/G RATIO: 1.7 (ref 1.1–2.2)
ALBUMIN SERPL-MCNC: 4.2 G/DL (ref 3.4–5)
ALP BLD-CCNC: 122 U/L (ref 40–129)
ALT SERPL-CCNC: 22 U/L (ref 10–40)
ANION GAP SERPL CALCULATED.3IONS-SCNC: 12 MMOL/L (ref 3–16)
AST SERPL-CCNC: 27 U/L (ref 15–37)
BASOPHILS ABSOLUTE: 0 K/UL (ref 0–0.2)
BASOPHILS RELATIVE PERCENT: 0.5 %
BILIRUB SERPL-MCNC: 0.7 MG/DL (ref 0–1)
BUN BLDV-MCNC: 15 MG/DL (ref 7–20)
CALCIUM SERPL-MCNC: 9.4 MG/DL (ref 8.3–10.6)
CHLORIDE BLD-SCNC: 105 MMOL/L (ref 99–110)
CO2: 26 MMOL/L (ref 21–32)
CREAT SERPL-MCNC: 1 MG/DL (ref 0.8–1.3)
EOSINOPHILS ABSOLUTE: 0.2 K/UL (ref 0–0.6)
EOSINOPHILS RELATIVE PERCENT: 3.4 %
GFR AFRICAN AMERICAN: >60
GFR NON-AFRICAN AMERICAN: >60
GLOBULIN: 2.5 G/DL
GLUCOSE BLD-MCNC: 169 MG/DL (ref 70–99)
HCT VFR BLD CALC: 42.9 % (ref 40.5–52.5)
HEMOGLOBIN: 14.4 G/DL (ref 13.5–17.5)
LYMPHOCYTES ABSOLUTE: 0.7 K/UL (ref 1–5.1)
LYMPHOCYTES RELATIVE PERCENT: 9.8 %
MCH RBC QN AUTO: 33.9 PG (ref 26–34)
MCHC RBC AUTO-ENTMCNC: 33.6 G/DL (ref 31–36)
MCV RBC AUTO: 101 FL (ref 80–100)
MONOCYTES ABSOLUTE: 0.7 K/UL (ref 0–1.3)
MONOCYTES RELATIVE PERCENT: 9.5 %
NEUTROPHILS ABSOLUTE: 5.4 K/UL (ref 1.7–7.7)
NEUTROPHILS RELATIVE PERCENT: 76.8 %
PDW BLD-RTO: 13.6 % (ref 12.4–15.4)
PLATELET # BLD: 234 K/UL (ref 135–450)
PMV BLD AUTO: 9.5 FL (ref 5–10.5)
POTASSIUM SERPL-SCNC: 4.8 MMOL/L (ref 3.5–5.1)
RBC # BLD: 4.25 M/UL (ref 4.2–5.9)
SODIUM BLD-SCNC: 143 MMOL/L (ref 136–145)
TOTAL PROTEIN: 6.7 G/DL (ref 6.4–8.2)
WBC # BLD: 7 K/UL (ref 4–11)

## 2018-11-12 DIAGNOSIS — F03.90 DEMENTIA WITHOUT BEHAVIORAL DISTURBANCE, UNSPECIFIED DEMENTIA TYPE: ICD-10-CM

## 2018-11-12 RX ORDER — MEMANTINE HYDROCHLORIDE 5 MG/1
5 TABLET ORAL DAILY
Qty: 30 TABLET | Refills: 3 | Status: SHIPPED | OUTPATIENT
Start: 2018-11-12 | End: 2019-03-13 | Stop reason: SDUPTHER

## 2018-11-15 ENCOUNTER — TELEPHONE (OUTPATIENT)
Dept: INTERNAL MEDICINE CLINIC | Age: 83
End: 2018-11-15

## 2018-11-16 ENCOUNTER — APPOINTMENT (OUTPATIENT)
Dept: GENERAL RADIOLOGY | Age: 83
End: 2018-11-16
Payer: MEDICARE

## 2018-11-16 ENCOUNTER — TELEPHONE (OUTPATIENT)
Dept: INTERNAL MEDICINE CLINIC | Age: 83
End: 2018-11-16

## 2018-11-16 ENCOUNTER — HOSPITAL ENCOUNTER (EMERGENCY)
Age: 83
Discharge: HOME | End: 2018-11-16
Attending: EMERGENCY MEDICINE
Payer: MEDICARE

## 2018-11-16 VITALS
TEMPERATURE: 97.8 F | DIASTOLIC BLOOD PRESSURE: 94 MMHG | HEART RATE: 74 BPM | BODY MASS INDEX: 25.99 KG/M2 | OXYGEN SATURATION: 100 % | SYSTOLIC BLOOD PRESSURE: 111 MMHG | WEIGHT: 161 LBS | RESPIRATION RATE: 16 BRPM

## 2018-11-16 DIAGNOSIS — R10.9 ABDOMINAL PAIN, UNSPECIFIED ABDOMINAL LOCATION: Primary | ICD-10-CM

## 2018-11-16 LAB
ALBUMIN SERPL-MCNC: 4.1 G/DL (ref 3.4–5)
ALP BLD-CCNC: 126 U/L (ref 40–129)
ALT SERPL-CCNC: 23 U/L (ref 10–40)
AMORPHOUS: ABNORMAL /HPF
ANION GAP SERPL CALCULATED.3IONS-SCNC: 13 MMOL/L (ref 3–16)
AST SERPL-CCNC: 29 U/L (ref 15–37)
BACTERIA: ABNORMAL /HPF
BASOPHILS ABSOLUTE: 0.1 K/UL (ref 0–0.2)
BASOPHILS RELATIVE PERCENT: 0.7 %
BILIRUB SERPL-MCNC: 0.9 MG/DL (ref 0–1)
BILIRUBIN DIRECT: <0.2 MG/DL (ref 0–0.3)
BILIRUBIN URINE: NEGATIVE MG/DL
BILIRUBIN, INDIRECT: NORMAL MG/DL (ref 0–1)
BLOOD, URINE: ABNORMAL
BUN BLDV-MCNC: 13 MG/DL (ref 7–20)
CALCIUM SERPL-MCNC: 9.6 MG/DL (ref 8.3–10.6)
CHLORIDE BLD-SCNC: 105 MMOL/L (ref 99–110)
CLARITY: ABNORMAL
CO2: 26 MMOL/L (ref 21–32)
COLOR: ABNORMAL
CREAT SERPL-MCNC: 0.8 MG/DL (ref 0.8–1.3)
EOSINOPHILS ABSOLUTE: 0.5 K/UL (ref 0–0.6)
EOSINOPHILS RELATIVE PERCENT: 6.5 %
GFR AFRICAN AMERICAN: >60
GFR NON-AFRICAN AMERICAN: >60
GLUCOSE BLD-MCNC: 104 MG/DL (ref 70–99)
GLUCOSE URINE: NEGATIVE MG/DL
HCT VFR BLD CALC: 43 % (ref 40.5–52.5)
HEMOGLOBIN: 14.5 G/DL (ref 13.5–17.5)
KETONES, URINE: NEGATIVE MG/DL
LEUKOCYTE ESTERASE, URINE: ABNORMAL
LIPASE: 17 U/L (ref 13–60)
LYMPHOCYTES ABSOLUTE: 0.9 K/UL (ref 1–5.1)
LYMPHOCYTES RELATIVE PERCENT: 11.8 %
MCH RBC QN AUTO: 33.6 PG (ref 26–34)
MCHC RBC AUTO-ENTMCNC: 33.8 G/DL (ref 31–36)
MCV RBC AUTO: 99.1 FL (ref 80–100)
MICROSCOPIC EXAMINATION: YES
MONOCYTES ABSOLUTE: 0.8 K/UL (ref 0–1.3)
MONOCYTES RELATIVE PERCENT: 11 %
NEUTROPHILS ABSOLUTE: 5.4 K/UL (ref 1.7–7.7)
NEUTROPHILS RELATIVE PERCENT: 70 %
NITRITE, URINE: NEGATIVE
PDW BLD-RTO: 13.5 % (ref 12.4–15.4)
PH UA: 7.5
PLATELET # BLD: 210 K/UL (ref 135–450)
PMV BLD AUTO: 9.2 FL (ref 5–10.5)
POTASSIUM SERPL-SCNC: 4.6 MMOL/L (ref 3.5–5.1)
PROTEIN UA: NEGATIVE MG/DL
RBC # BLD: 4.33 M/UL (ref 4.2–5.9)
RBC UA: ABNORMAL /HPF (ref 0–2)
SODIUM BLD-SCNC: 144 MMOL/L (ref 136–145)
SPECIFIC GRAVITY UA: 1.02
TOTAL PROTEIN: 7.1 G/DL (ref 6.4–8.2)
UROBILINOGEN, URINE: 0.2 E.U./DL
WBC # BLD: 7.7 K/UL (ref 4–11)
WBC UA: ABNORMAL /HPF (ref 0–5)

## 2018-11-16 PROCEDURE — 80048 BASIC METABOLIC PNL TOTAL CA: CPT

## 2018-11-16 PROCEDURE — 80076 HEPATIC FUNCTION PANEL: CPT

## 2018-11-16 PROCEDURE — 51702 INSERT TEMP BLADDER CATH: CPT

## 2018-11-16 PROCEDURE — 71046 X-RAY EXAM CHEST 2 VIEWS: CPT

## 2018-11-16 PROCEDURE — 99284 EMERGENCY DEPT VISIT MOD MDM: CPT

## 2018-11-16 PROCEDURE — 81001 URINALYSIS AUTO W/SCOPE: CPT

## 2018-11-16 PROCEDURE — 83690 ASSAY OF LIPASE: CPT

## 2018-11-16 PROCEDURE — 85025 COMPLETE CBC W/AUTO DIFF WBC: CPT

## 2018-11-16 PROCEDURE — 87086 URINE CULTURE/COLONY COUNT: CPT

## 2018-11-17 NOTE — ED NOTES
Attempted to straight cath pt unable to pass catheter past the prostate Md aware< per Md give pt some water to drink and can retry to straight cath in 15 min.  Night Nurse made aware     Memory Dues, RN  11/16/18 Kierra. [address], RN  11/16/18 3002

## 2018-11-17 NOTE — ED NOTES
Pt dcd home in stable condition along with his belongings and paperwork. Pt verbalizes understanding of dc and follow up instructions. Denies any questions.       Leesa العلي RN  11/16/18 0941

## 2018-11-18 LAB
ORGANISM: ABNORMAL
URINE CULTURE, ROUTINE: ABNORMAL
URINE CULTURE, ROUTINE: ABNORMAL

## 2018-11-20 ENCOUNTER — TELEPHONE (OUTPATIENT)
Dept: INTERNAL MEDICINE CLINIC | Age: 83
End: 2018-11-20

## 2018-11-30 ENCOUNTER — OFFICE VISIT (OUTPATIENT)
Dept: INTERNAL MEDICINE CLINIC | Age: 83
End: 2018-11-30
Payer: MEDICARE

## 2018-11-30 ENCOUNTER — ANTI-COAG VISIT (OUTPATIENT)
Dept: PHARMACY | Age: 83
End: 2018-11-30
Payer: MEDICARE

## 2018-11-30 VITALS
WEIGHT: 160 LBS | HEART RATE: 74 BPM | BODY MASS INDEX: 25.71 KG/M2 | TEMPERATURE: 97.7 F | DIASTOLIC BLOOD PRESSURE: 62 MMHG | OXYGEN SATURATION: 96 % | HEIGHT: 66 IN | SYSTOLIC BLOOD PRESSURE: 92 MMHG

## 2018-11-30 DIAGNOSIS — Z87.898 HISTORY OF ABDOMINAL PAIN: Primary | ICD-10-CM

## 2018-11-30 DIAGNOSIS — I35.9 AORTIC VALVE DISORDER: ICD-10-CM

## 2018-11-30 DIAGNOSIS — Z95.2 S/P AVR: ICD-10-CM

## 2018-11-30 LAB — INTERNATIONAL NORMALIZATION RATIO, POC: 1.8

## 2018-11-30 PROCEDURE — 99211 OFF/OP EST MAY X REQ PHY/QHP: CPT

## 2018-11-30 PROCEDURE — 99213 OFFICE O/P EST LOW 20 MIN: CPT | Performed by: INTERNAL MEDICINE

## 2018-11-30 PROCEDURE — 85610 PROTHROMBIN TIME: CPT

## 2018-11-30 NOTE — PROGRESS NOTES
REMAIN UPRIGHT FOR 30 MINUTES 2/28/18  Yes Shivani Ricardo MD   digoxin (LANOXIN) 125 MCG tablet TAKE ONE TABLET BY MOUTH DAILY 1/11/18  Yes Shivani Ricardo MD   Omega-3 Fatty Acids (FISH OIL) 1000 MG CPDR Take 1 capsule by mouth daily. Yes Historical Provider, MD   acetaminophen (TYLENOL) 325 MG tablet Take 2 tablets by mouth every 6 hours as needed for Pain or Fever (For mild pain level 1-3 or for fever > 100.5). 6/28/12  Yes YUNIOR Colbert - CNP   aspirin 81 MG EC tablet Take 81 mg by mouth daily. Yes Historical Provider, MD   Cholecalciferol (VITAMIN D) 1000 UNIT TABS Take 2 tablets by mouth daily. 6/10/10  Yes Historical Provider, MD   Coenzyme Q10 (CO Q-10 PO) Take 1 tablet by mouth daily. 6/10/10  Yes Historical Provider, MD   Multiple Vitamin (MULTIVITAMIN PO) Take 1 tablet by mouth daily. 6/10/10  Yes Historical Provider, MD       REVIEW OF SYSTEMS:  Review of Systems   Constitutional: Negative for appetite change, chills and fever. Eyes: Negative for visual disturbance. Respiratory: Negative for chest tightness and shortness of breath. Cardiovascular: Negative for chest pain. Gastrointestinal: Negative for abdominal pain, nausea and vomiting. Skin: Negative for rash. Allergic/Immunologic: Negative for immunocompromised state. Neurological: Negative for dizziness and headaches. Physical Exam:      Vitals: BP 92/62 (Site: Left Upper Arm, Position: Sitting, Cuff Size: Small Adult)   Pulse 74   Temp 97.7 °F (36.5 °C)   Ht 5' 6\" (1.676 m)   Wt 160 lb (72.6 kg)   SpO2 96%   BMI 25.82 kg/m²     Body mass index is 25.82 kg/m². Wt Readings from Last 3 Encounters:   11/30/18 160 lb (72.6 kg)   11/16/18 161 lb (73 kg)   10/31/18 161 lb (73 kg)     Physical Exam   Constitutional: No distress. HENT:   Head: Normocephalic and atraumatic. Mouth/Throat: Oropharynx is clear and moist. No oropharyngeal exudate.    Eyes: Conjunctivae are normal. Right eye exhibits no

## 2018-12-01 ASSESSMENT — ENCOUNTER SYMPTOMS
ABDOMINAL PAIN: 0
SHORTNESS OF BREATH: 0
VOMITING: 0
CHEST TIGHTNESS: 0
NAUSEA: 0

## 2018-12-04 DIAGNOSIS — K08.9 POOR DENTITION: Primary | ICD-10-CM

## 2018-12-04 RX ORDER — RISPERIDONE 0.5 MG/1
0.5 TABLET, FILM COATED ORAL DAILY
Qty: 90 TABLET | Refills: 1 | Status: SHIPPED | OUTPATIENT
Start: 2018-12-04 | End: 2019-05-29 | Stop reason: SDUPTHER

## 2018-12-21 ENCOUNTER — ANTI-COAG VISIT (OUTPATIENT)
Dept: PHARMACY | Age: 83
End: 2018-12-21
Payer: MEDICARE

## 2018-12-21 DIAGNOSIS — I35.9 AORTIC VALVE DISORDER: ICD-10-CM

## 2018-12-21 DIAGNOSIS — Z95.2 S/P AVR: ICD-10-CM

## 2018-12-21 LAB — INTERNATIONAL NORMALIZATION RATIO, POC: 1.8

## 2018-12-21 PROCEDURE — 85610 PROTHROMBIN TIME: CPT

## 2018-12-21 PROCEDURE — 99211 OFF/OP EST MAY X REQ PHY/QHP: CPT

## 2018-12-21 NOTE — PROGRESS NOTES
ANTICOAGULATION SERVICE    Jing Mejia is a 80 y.o. male with PMHx significant for AVR (2012), HTN, HLD who presents to clinic 12/21/2018 for anticoagulation monitoring and adjustment.     Anticoagulation Indication(s):  Heart Valve Replacement - bioprosthetic porcine AVR    Referring Physician:  Dr. Isabel Gant  Goal INR Range:  2-3  Duration of Anticoagulation Therapy:  Indefinite  Time of day dose taken:  PM  Product patient has at home:  warfarin 5 mg (peach)    Lab Results   Component Value Date    RBC 4.33 11/16/2018    HGB 14.5 11/16/2018    HCT 43.0 11/16/2018    MCV 99.1 11/16/2018    MCH 33.6 11/16/2018    MPV 9.2 11/16/2018    RDW 13.5 11/16/2018     11/16/2018     INR Summary                            Warfarin regimen (mg)  Date INR   A/P    Sun Mon Tue Wed Thu Fri Sat Mg/wk  12/21 1.8 Below goal, increase  5 2.5 5 2.5 5 2.5 2.5 25  11/30 1.8 Below goal, no change 5 2.5 2.5 2.5 5 2.5 2.5 22.5  10/31 2.4 At goal, no change  5 2.5 2.5 2.5 5 2.5 2.5 22.5  10/8 2.3 At goal, no change  5 2.5 2.5 2.5 5 2.5 2.5 22.5  9/24 3.2 Above goal, decrease  5 2.5 2.5 2.5 5 2.5 2.5 22.5  9/10 2.9 At goal, no change  5 2.5 2.5 2.5 5 2.5 5 25  8/27 3.3 Above goal, decrease  5 2.5 2.5 2.5 5 2.5 5 25  8/3 2.9 At goal, no change  5 2.5 5 2.5 5 2.5 5 27.5  7/16 2.6 At goal, no change  5 2.5 5 2.5 5 2.5 5 27.5  7/9 3.1 Above goal, continue  5 2.5 5 2.5 5 2.5 5 27.5  6/11 2.6 At goal, no change  5 2.5 5 2.5 5 2.5 5 27.5  5/16 2.4 At goal, no change  5 2.5 5 2.5 5 2.5 5 27.5  4/16 2.0 At goal, no change  5 2.5 5 2.5 5 2.5 5 27.5  3/19 2.4 At goal, no change  5 2.5 5 2.5 5 2.5 5 27.5  3/5 3.4 Above goal, decrease  5 2.5 5 2.5 5 2.5 5 27.5  2/19 2.9 At goal, no change  5 2.5 5 5 5 2.5 5 30    Patient History:  Recent hospitalizations/HC visits -7/16 Dr. Isabel Gant for chronic conditions: no med changes  -Patient was previously enrolled in Coumadin clinic at [facility]   Recent medication changes None reported today   Medications taken regularly that may interact with warfarin or alter INR ASA 81 mg, Co Q-10, fish oil   Warfarin dose taken as prescribed Yes - uses pillbox filled by wife, Cristal Benson   Signs/symptoms of bleeding No h/o major bleeding reported   Vitamin K intake -Normally has ~0 servings of green, leafy vegetables per week  -Drinks Ensure and V8 both daily   Recent vomiting/diarrhea/fever, changes in weight or activity level None reported   Tobacco or alcohol use Patient denies both   Upcoming surgeries or procedures None reported     Assessment/Plan:  Patient's INR was subtherapeutic again today (1.8), unchanged from last visit. Cristal Benson (wife) endorses overall declining appetite, and also states that lately patient has trouble getting his pills into his mouth. However, Cristal Benson is now helping patient take his medications, so she is fairly certain that he did not miss any doses this time. Patient was instructed to increase warfarin dose to 2.5 mg daily, except 5 mg on Sun/Tue/Thu.  Repeat INR in 3 weeks. Patient was reminded to maintain consistent vitamin K intake and call with any bleeding, medication changes, or fever/vomiting/diarrhea. Patient understands dosing directions and information discussed. Dosing schedule and follow up appointment given to patient. Progress note routed to referring physician's office. Patient acknowledges working in consult agreement with pharmacist as referred by his/her physician. Next Clinic Appointment:  1/11    Please call [facility] at [phone] with any questions. Thanks! Ole Ormond.  Penny Basilio, Raymundo  [facility]  Ph: [phone]  12/21/2018 1:02 PM

## 2019-01-10 ENCOUNTER — TELEPHONE (OUTPATIENT)
Dept: INTERNAL MEDICINE CLINIC | Age: 84
End: 2019-01-10

## 2019-01-10 RX ORDER — DIGOXIN 125 MCG
TABLET ORAL
Qty: 90 TABLET | Refills: 3 | Status: SHIPPED | OUTPATIENT
Start: 2019-01-10 | End: 2019-12-17 | Stop reason: SDUPTHER

## 2019-01-11 ENCOUNTER — ANTI-COAG VISIT (OUTPATIENT)
Dept: PHARMACY | Age: 84
End: 2019-01-11
Payer: MEDICARE

## 2019-01-11 DIAGNOSIS — I35.9 AORTIC VALVE DISORDER: ICD-10-CM

## 2019-01-11 DIAGNOSIS — Z95.2 S/P AVR: ICD-10-CM

## 2019-01-11 LAB — INTERNATIONAL NORMALIZATION RATIO, POC: 2.3

## 2019-01-11 PROCEDURE — 85610 PROTHROMBIN TIME: CPT

## 2019-01-11 PROCEDURE — 99211 OFF/OP EST MAY X REQ PHY/QHP: CPT

## 2019-02-08 ENCOUNTER — ANTI-COAG VISIT (OUTPATIENT)
Dept: PHARMACY | Age: 84
End: 2019-02-08
Payer: MEDICARE

## 2019-02-08 DIAGNOSIS — I35.9 AORTIC VALVE DISORDER: ICD-10-CM

## 2019-02-08 DIAGNOSIS — Z95.2 S/P AVR: ICD-10-CM

## 2019-02-08 LAB — INTERNATIONAL NORMALIZATION RATIO, POC: 1.8

## 2019-02-08 PROCEDURE — 85610 PROTHROMBIN TIME: CPT | Performed by: PHARMACIST

## 2019-02-08 PROCEDURE — 99211 OFF/OP EST MAY X REQ PHY/QHP: CPT | Performed by: PHARMACIST

## 2019-02-08 RX ORDER — WARFARIN SODIUM 5 MG/1
TABLET ORAL
Qty: 30 TABLET | Refills: 5 | Status: SHIPPED | OUTPATIENT
Start: 2019-02-08 | End: 2019-06-30 | Stop reason: SDUPTHER

## 2019-02-13 ENCOUNTER — TELEPHONE (OUTPATIENT)
Dept: INTERNAL MEDICINE CLINIC | Age: 84
End: 2019-02-13

## 2019-02-13 DIAGNOSIS — N39.41 URGE INCONTINENCE OF URINE: ICD-10-CM

## 2019-02-21 ENCOUNTER — OFFICE VISIT (OUTPATIENT)
Dept: INTERNAL MEDICINE CLINIC | Age: 84
End: 2019-02-21
Payer: MEDICARE

## 2019-02-21 VITALS
SYSTOLIC BLOOD PRESSURE: 104 MMHG | WEIGHT: 157 LBS | BODY MASS INDEX: 25.34 KG/M2 | HEART RATE: 74 BPM | OXYGEN SATURATION: 95 % | DIASTOLIC BLOOD PRESSURE: 62 MMHG

## 2019-02-21 DIAGNOSIS — G30.1 LATE ONSET ALZHEIMER'S DISEASE WITHOUT BEHAVIORAL DISTURBANCE (HCC): Primary | ICD-10-CM

## 2019-02-21 DIAGNOSIS — Z95.2 S/P AVR: ICD-10-CM

## 2019-02-21 DIAGNOSIS — F02.80 LATE ONSET ALZHEIMER'S DISEASE WITHOUT BEHAVIORAL DISTURBANCE (HCC): Primary | ICD-10-CM

## 2019-02-21 DIAGNOSIS — L98.9 SCALP LESION: ICD-10-CM

## 2019-02-21 DIAGNOSIS — E78.00 PURE HYPERCHOLESTEROLEMIA: ICD-10-CM

## 2019-02-21 PROCEDURE — 99213 OFFICE O/P EST LOW 20 MIN: CPT | Performed by: INTERNAL MEDICINE

## 2019-02-21 ASSESSMENT — PATIENT HEALTH QUESTIONNAIRE - PHQ9
SUM OF ALL RESPONSES TO PHQ9 QUESTIONS 1 & 2: 0
SUM OF ALL RESPONSES TO PHQ QUESTIONS 1-9: 0
1. LITTLE INTEREST OR PLEASURE IN DOING THINGS: 0
SUM OF ALL RESPONSES TO PHQ QUESTIONS 1-9: 0
2. FEELING DOWN, DEPRESSED OR HOPELESS: 0

## 2019-02-21 ASSESSMENT — ENCOUNTER SYMPTOMS
ABDOMINAL PAIN: 0
VOMITING: 0
CHEST TIGHTNESS: 0
SHORTNESS OF BREATH: 0
NAUSEA: 0

## 2019-03-08 ENCOUNTER — ANTI-COAG VISIT (OUTPATIENT)
Dept: PHARMACY | Age: 84
End: 2019-03-08
Payer: MEDICARE

## 2019-03-08 DIAGNOSIS — Z95.2 S/P AVR: ICD-10-CM

## 2019-03-08 DIAGNOSIS — I35.9 AORTIC VALVE DISORDER: ICD-10-CM

## 2019-03-08 LAB — INTERNATIONAL NORMALIZATION RATIO, POC: 1.8

## 2019-03-08 PROCEDURE — 99211 OFF/OP EST MAY X REQ PHY/QHP: CPT

## 2019-03-08 PROCEDURE — 85610 PROTHROMBIN TIME: CPT

## 2019-03-13 ENCOUNTER — TELEPHONE (OUTPATIENT)
Dept: INTERNAL MEDICINE CLINIC | Age: 84
End: 2019-03-13

## 2019-03-13 DIAGNOSIS — F03.90 DEMENTIA WITHOUT BEHAVIORAL DISTURBANCE, UNSPECIFIED DEMENTIA TYPE: ICD-10-CM

## 2019-03-13 RX ORDER — MEMANTINE HYDROCHLORIDE 5 MG/1
5 TABLET ORAL DAILY
Qty: 30 TABLET | Refills: 3 | Status: SHIPPED | OUTPATIENT
Start: 2019-03-13 | End: 2019-05-22 | Stop reason: SDUPTHER

## 2019-03-22 ENCOUNTER — ANTI-COAG VISIT (OUTPATIENT)
Dept: PHARMACY | Age: 84
End: 2019-03-22
Payer: MEDICARE

## 2019-03-22 DIAGNOSIS — N40.0 BENIGN PROSTATIC HYPERPLASIA, UNSPECIFIED WHETHER LOWER URINARY TRACT SYMPTOMS PRESENT: ICD-10-CM

## 2019-03-22 DIAGNOSIS — Z95.2 S/P AVR: ICD-10-CM

## 2019-03-22 DIAGNOSIS — I35.9 AORTIC VALVE DISORDER: ICD-10-CM

## 2019-03-22 LAB — INTERNATIONAL NORMALIZATION RATIO, POC: 2.2

## 2019-03-22 PROCEDURE — 85610 PROTHROMBIN TIME: CPT

## 2019-03-22 PROCEDURE — 99211 OFF/OP EST MAY X REQ PHY/QHP: CPT

## 2019-03-22 RX ORDER — TAMSULOSIN HYDROCHLORIDE 0.4 MG/1
CAPSULE ORAL
Qty: 30 CAPSULE | Refills: 5 | Status: SHIPPED | OUTPATIENT
Start: 2019-03-22 | End: 2019-09-19 | Stop reason: SDUPTHER

## 2019-04-08 ENCOUNTER — ANTI-COAG VISIT (OUTPATIENT)
Dept: PHARMACY | Age: 84
End: 2019-04-08
Payer: MEDICARE

## 2019-04-08 DIAGNOSIS — Z95.2 S/P AVR: ICD-10-CM

## 2019-04-08 DIAGNOSIS — I35.9 AORTIC VALVE DISORDER: ICD-10-CM

## 2019-04-08 DIAGNOSIS — I50.30 (HFPEF) HEART FAILURE WITH PRESERVED EJECTION FRACTION (HCC): ICD-10-CM

## 2019-04-08 LAB — INTERNATIONAL NORMALIZATION RATIO, POC: 2.4

## 2019-04-08 PROCEDURE — 85610 PROTHROMBIN TIME: CPT

## 2019-04-08 PROCEDURE — 99211 OFF/OP EST MAY X REQ PHY/QHP: CPT

## 2019-04-08 RX ORDER — FUROSEMIDE 20 MG/1
TABLET ORAL
Qty: 30 TABLET | Refills: 5 | Status: SHIPPED | OUTPATIENT
Start: 2019-04-08 | End: 2019-08-20 | Stop reason: SDUPTHER

## 2019-04-08 NOTE — PROGRESS NOTES
ANTICOAGULATION SERVICE    Brandan Borjas is a 80 y.o. male with PMHx significant for AVR (2012), HTN, HLD who presents to clinic 4/8/2019 for anticoagulation monitoring and adjustment.     Anticoagulation Indication(s):  Heart Valve Replacement - bioprosthetic porcine AVR    Referring Physician:  Dr. Lacie Fierro  Goal INR Range:  2-3  Duration of Anticoagulation Therapy:  Indefinite  Time of day dose taken:  PM  Product patient has at home:  warfarin 5 mg (peach)    Lab Results   Component Value Date    RBC 4.33 11/16/2018    HGB 14.5 11/16/2018    HCT 43.0 11/16/2018    MCV 99.1 11/16/2018    MCH 33.6 11/16/2018    MPV 9.2 11/16/2018    RDW 13.5 11/16/2018     11/16/2018     INR Summary                            Warfarin regimen (mg)  Date INR   A/P    Sun Mon Tue Wed Thu Fri Sat Mg/wk  4/8 2.4 At goal, no change  5 2.5 5 2.5 5 2.5 5 27.5  3/22 2.2 At goal, no change  5 2.5 5 2.5 5 2.5 5 27.5  3/8 1.8 Below goal, increase  5 2.5 5 2.5 5 2.5 5 27.5  2/8 1.8 Below goal, no change 5 2.5 5 2.5 5 2.5 2.5 25  1/11 2.3 At goal, no change  5 2.5 5 2.5 5 2.5 2.5 25  12/21 1.8 Below goal, increase  5 2.5 5 2.5 5 2.5 2.5 25  11/30 1.8 Below goal, no change 5 2.5 2.5 2.5 5 2.5 2.5 22.5  10/31 2.4 At goal, no change  5 2.5 2.5 2.5 5 2.5 2.5 22.5  10/8 2.3 At goal, no change  5 2.5 2.5 2.5 5 2.5 2.5 22.5  9/24 3.2 Above goal, decrease  5 2.5 2.5 2.5 5 2.5 2.5 22.5  9/10 2.9 At goal, no change  5 2.5 2.5 2.5 5 2.5 5 25  8/27 3.3 Above goal, decrease  5 2.5 2.5 2.5 5 2.5 5 25  8/3 2.9 At goal, no change  5 2.5 5 2.5 5 2.5 5 27.5  7/16 2.6 At goal, no change  5 2.5 5 2.5 5 2.5 5 27.5  7/9 3.1 Above goal, continue  5 2.5 5 2.5 5 2.5 5 27.5  6/11 2.6 At goal, no change  5 2.5 5 2.5 5 2.5 5 27.5  5/16 2.4 At goal, no change  5 2.5 5 2.5 5 2.5 5 27.5  4/16 2.0 At goal, no change  5 2.5 5 2.5 5 2.5 5 27.5  3/19 2.4 At goal, no change  5 2.5 5 2.5 5 2.5 5 27.5  3/5 3.4 Above goal, decrease  5 2.5 5 2.5 5 2.5 5 27.5  2/19 2.9 At goal, no change  5 2.5 5 5 5 2.5 5 30    Patient History:  Recent hospitalizations/HC visits 2/21 Dr. Ayush Villalobos for chronic conditions: no med changes   Recent medication changes None reported today   Medications taken regularly that may interact with warfarin or alter INR ASA 81 mg, Co Q-10, fish oil   Warfarin dose taken as prescribed Yes - uses pillbox filled by wife, Gurmeet Martinez   Signs/symptoms of bleeding No h/o major bleeding reported   Vitamin K intake -Normally has ~0 servings of green, leafy vegetables per week d/t missing teeth  -Drinks Ensure and V8 both daily   Recent vomiting/diarrhea/fever, changes in weight or activity level None reported   Tobacco or alcohol use Patient denies both   Upcoming surgeries or procedures None reported     Assessment/Plan:  Patient's INR was therapeutic today (2.4). Gurmeet Martinez (wife) reports today that she may have accidentally given patient a double dose of warfarin on 4/1, but she gave a half dose on 4/2 to counter it. Gurmeet Martinez endorses overall declining appetite, and also states that lately patient has trouble getting his pills into his mouth. However, Gurmeet Martinez is now helping patient take his medications. Patient was instructed to continue warfarin 5 mg daily, except 2.5 mg on MWF. Repeat INR in 4 weeks. Patient was reminded to maintain consistent vitamin K intake and call with any bleeding, medication changes, or fever/vomiting/diarrhea. Patient understands dosing directions and information discussed. Dosing schedule and follow up appointment given to patient. Progress note routed to referring physician's office. Patient acknowledges working in consult agreement with pharmacist as referred by his/her physician. Next Clinic Appointment:  5/6    Please call [facility] at [phone] with any questions. Thanks! Luisana Marshall.  Dean Nichole, PharmD  [facility]  Ph: [phone]  4/8/2019 2:14 PM

## 2019-04-10 ENCOUNTER — APPOINTMENT (RX ONLY)
Dept: URBAN - METROPOLITAN AREA CLINIC 170 | Facility: CLINIC | Age: 84
Setting detail: DERMATOLOGY
End: 2019-04-10

## 2019-04-10 DIAGNOSIS — I87.2 VENOUS INSUFFICIENCY (CHRONIC) (PERIPHERAL): ICD-10-CM

## 2019-04-10 DIAGNOSIS — D485 NEOPLASM OF UNCERTAIN BEHAVIOR OF SKIN: ICD-10-CM

## 2019-04-10 DIAGNOSIS — D22 MELANOCYTIC NEVI: ICD-10-CM

## 2019-04-10 DIAGNOSIS — D18.0 HEMANGIOMA: ICD-10-CM

## 2019-04-10 DIAGNOSIS — L82.1 OTHER SEBORRHEIC KERATOSIS: ICD-10-CM

## 2019-04-10 DIAGNOSIS — L57.0 ACTINIC KERATOSIS: ICD-10-CM

## 2019-04-10 DIAGNOSIS — L81.4 OTHER MELANIN HYPERPIGMENTATION: ICD-10-CM

## 2019-04-10 PROBLEM — H91.90 UNSPECIFIED HEARING LOSS, UNSPECIFIED EAR: Status: ACTIVE | Noted: 2019-04-10

## 2019-04-10 PROBLEM — D48.5 NEOPLASM OF UNCERTAIN BEHAVIOR OF SKIN: Status: ACTIVE | Noted: 2019-04-10

## 2019-04-10 PROBLEM — D22.5 MELANOCYTIC NEVI OF TRUNK: Status: ACTIVE | Noted: 2019-04-10

## 2019-04-10 PROBLEM — N40.0 BENIGN PROSTATIC HYPERPLASIA WITHOUT LOWER URINARY TRACT SYMPTOMS: Status: ACTIVE | Noted: 2019-04-10

## 2019-04-10 PROBLEM — D18.01 HEMANGIOMA OF SKIN AND SUBCUTANEOUS TISSUE: Status: ACTIVE | Noted: 2019-04-10

## 2019-04-10 PROCEDURE — 17003 DESTRUCT PREMALG LES 2-14: CPT

## 2019-04-10 PROCEDURE — ? LIQUID NITROGEN

## 2019-04-10 PROCEDURE — 11103 TANGNTL BX SKIN EA SEP/ADDL: CPT

## 2019-04-10 PROCEDURE — ? BIOPSY BY SHAVE METHOD

## 2019-04-10 PROCEDURE — 11102 TANGNTL BX SKIN SINGLE LES: CPT

## 2019-04-10 PROCEDURE — ? PRESCRIPTION

## 2019-04-10 PROCEDURE — ? INVENTORY

## 2019-04-10 PROCEDURE — ? COUNSELING

## 2019-04-10 PROCEDURE — 17000 DESTRUCT PREMALG LESION: CPT | Mod: 59

## 2019-04-10 PROCEDURE — 99203 OFFICE O/P NEW LOW 30 MIN: CPT | Mod: 25

## 2019-04-10 RX ORDER — TRIAMCINOLONE ACETONIDE 1 MG/G
OINTMENT TOPICAL
Qty: 1 | Refills: 3 | Status: ERX | COMMUNITY
Start: 2019-04-10

## 2019-04-10 RX ADMIN — TRIAMCINOLONE ACETONIDE: 1 OINTMENT TOPICAL at 00:00

## 2019-04-10 ASSESSMENT — LOCATION DETAILED DESCRIPTION DERM
LOCATION DETAILED: LEFT SUPERIOR MEDIAL FOREHEAD
LOCATION DETAILED: LEFT DISTAL PRETIBIAL REGION
LOCATION DETAILED: RIGHT PROXIMAL PRETIBIAL REGION
LOCATION DETAILED: LOWER STERNUM
LOCATION DETAILED: XIPHOID
LOCATION DETAILED: MIDDLE STERNUM
LOCATION DETAILED: RIGHT SUPERIOR MEDIAL FOREHEAD
LOCATION DETAILED: RIGHT DISTAL PRETIBIAL REGION
LOCATION DETAILED: LEFT PROXIMAL PRETIBIAL REGION

## 2019-04-10 ASSESSMENT — LOCATION ZONE DERM
LOCATION ZONE: FACE
LOCATION ZONE: LEG
LOCATION ZONE: TRUNK

## 2019-04-10 ASSESSMENT — LOCATION SIMPLE DESCRIPTION DERM
LOCATION SIMPLE: LEFT FOREHEAD
LOCATION SIMPLE: CHEST
LOCATION SIMPLE: ABDOMEN
LOCATION SIMPLE: RIGHT PRETIBIAL REGION
LOCATION SIMPLE: LEFT PRETIBIAL REGION
LOCATION SIMPLE: RIGHT FOREHEAD

## 2019-04-10 NOTE — PROCEDURE: BIOPSY BY SHAVE METHOD
Consent: Written consent was obtained and risks were reviewed including but not limited to scarring, infection, bleeding, scabbing, incomplete removal, nerve damage and allergy to anesthesia.
Destruction After The Procedure: No
Cryotherapy Text: The wound bed was treated with cryotherapy after the biopsy was performed.
Anesthesia Type: 2% lidocaine with epinephrine
Additional Anesthesia Volume In Cc (Will Not Render If 0): 0
Lab Facility: 3
Wound Care: Petrolatum
Size Of Lesion In Cm: 0.6
Electrodesiccation Text: The wound bed was treated with electrodesiccation after the biopsy was performed.
Depth Of Biopsy: dermis
X Size Of Lesion In Cm: 0.8
Type Of Destruction Used: Curettage
Electrodesiccation And Curettage Text: The wound bed was treated with electrodesiccation and curettage after the biopsy was performed.
Biopsy Type: H and E
Billing Type: Third-Party Bill
Anesthesia Volume In Cc (Will Not Render If 0): 1
Dressing: Band-Aid
Post-Care Instructions: I reviewed with the patient in detail post-care instructions. Patient is to keep the biopsy site dry overnight, and then apply bacitracin twice daily until healed. Patient may apply hydrogen peroxide soaks to remove any crusting.
Silver Nitrate Text: The wound bed was treated with silver nitrate after the biopsy was performed.
Was A Bandage Applied: Yes
Hemostasis: Electrodesiccation and Aluminum Chloride
Notification Instructions: Patient will be notified of biopsy results. However, patient instructed to call the office if not contacted within 2 weeks.
Curettage Text: The wound bed was treated with curettage after the biopsy was performed.
Anticipated Plan (Based On Presumed Biopsy Results): mohs if +
Biopsy Method: double edge Personna blade
Detail Level: Detailed
Lab: -[id number]
Size Of Lesion In Cm: 1.5
X Size Of Lesion In Cm: 1.7

## 2019-04-17 ENCOUNTER — OFFICE VISIT (OUTPATIENT)
Dept: CARDIOLOGY CLINIC | Age: 84
End: 2019-04-17
Payer: MEDICARE

## 2019-04-17 VITALS
DIASTOLIC BLOOD PRESSURE: 60 MMHG | SYSTOLIC BLOOD PRESSURE: 88 MMHG | HEART RATE: 60 BPM | WEIGHT: 164 LBS | BODY MASS INDEX: 26.47 KG/M2

## 2019-04-17 DIAGNOSIS — I10 ESSENTIAL HYPERTENSION: ICD-10-CM

## 2019-04-17 DIAGNOSIS — I35.0 NONRHEUMATIC AORTIC VALVE STENOSIS: ICD-10-CM

## 2019-04-17 DIAGNOSIS — Z95.2 S/P AVR (AORTIC VALVE REPLACEMENT): Primary | ICD-10-CM

## 2019-04-17 DIAGNOSIS — I48.20 CHRONIC ATRIAL FIBRILLATION (HCC): ICD-10-CM

## 2019-04-17 PROCEDURE — 99214 OFFICE O/P EST MOD 30 MIN: CPT | Performed by: INTERNAL MEDICINE

## 2019-04-17 NOTE — PROGRESS NOTES
Subjective:      Patient ID: Rush Castro is a 80 y.o. male who is being seen today for a 3 month cardiac follow up AVR/AS/AFib/HTN. Denies chest pain, shortness of breath, syncope, palpitations, or dizziness. No exertional sob. No edema. No palp/syncope. Gets around ok. BP ok. Past Medical History:   Diagnosis Date    Aortic stenosis     Arthritis     Dementia     Hyperlipidemia     Hypertension     Kidney stones     Pneumonia     distant past    SOB (shortness of breath)        Past Surgical History:   Procedure Laterality Date    ANKLE FRACTURE SURGERY      AORTIC VALVE REPLACEMENT  6/20/2012    DR. Chand - porcine     COLONOSCOPY      FOREARM FRACTURE SURGERY      HEMORRHOID SURGERY  [id number]    KIDNEY STONE SURGERY      TESTICLE SURGERY      fluid removed    TONSILLECTOMY  [id number]    adenoidectomy         No Known Allergies     Social History     Socioeconomic History    Marital status:      Spouse name: Sukhdev Zamora Number of children: 3    Years of education: 15    Highest education level: Not on file   Occupational History    Occupation: retired   Social Needs    Financial resource strain: Not on file    Food insecurity:     Worry: Not on file     Inability: Not on file   [employer] needs:     Medical: Not on file     Non-medical: Not on file   Tobacco Use    Smoking status: Never Smoker    Smokeless tobacco: Never Used   Substance and Sexual Activity    Alcohol use:  Yes     Alcohol/week: 6.6 oz     Types: 4 Cans of beer, 7 Shots of liquor per week    Drug use: No    Sexual activity: Not on file   Lifestyle    Physical activity:     Days per week: Not on file     Minutes per session: Not on file    Stress: Not on file   Relationships    Social connections:     Talks on phone: Not on file     Gets together: Not on file     Attends Bahai service: Not on file     Active member of club or organization: Not on file     Attends meetings of clubs or organizations: Not on file     Relationship status: Not on file    Intimate partner violence:     Fear of current or ex partner: Not on file     Emotionally abused: Not on file     Physically abused: Not on file     Forced sexual activity: Not on file   Other Topics Concern    Not on file   Social History Narrative    Not on file        Patient has a family history includes Arthritis in his mother; Heart Disease in his father; Kidney Disease in his brother. Patient  has a past medical history of Aortic stenosis, Arthritis, Dementia, Hyperlipidemia, Hypertension, Kidney stones, Pneumonia, and SOB (shortness of breath). Current Outpatient Medications   Medication Sig Dispense Refill    furosemide (LASIX) 20 MG tablet Take on Monday , Wednesday, Friday 30 tablet 5    tamsulosin (FLOMAX) 0.4 MG capsule TAKE ONE CAPSULE BY MOUTH DAILY 30 capsule 5    memantine (NAMENDA) 5 MG tablet Take 1 tablet by mouth daily 30 tablet 3    Mirabegron ER 25 MG TB24 Take 1 tablet by mouth daily 30 tablet 5    warfarin (COUMADIN) 5 MG tablet Take 2.5 mg on Mondays and Friday. Take 5 mg the rest of the days. 30 tablet 5    digoxin (LANOXIN) 125 MCG tablet TAKE ONE TABLET BY MOUTH DAILY 90 tablet 3    risperiDONE (RISPERDAL) 0.5 MG tablet Take 1 tablet by mouth daily 90 tablet 1    atorvastatin (LIPITOR) 40 MG tablet Take 1 tablet by mouth daily 90 tablet 2    alendronate (FOSAMAX) 70 MG tablet TAKE 1 TABLET BY MOUTH ONCE WEEKLY BEFORE BREAKFAST, ON AN EMPTY STOMACH: REMAIN UPRIGHT FOR 30 MINUTES 12 tablet 0    Omega-3 Fatty Acids (FISH OIL) 1000 MG CPDR Take 1 capsule by mouth daily.  acetaminophen (TYLENOL) 325 MG tablet Take 2 tablets by mouth every 6 hours as needed for Pain or Fever (For mild pain level 1-3 or for fever > 100.5).  aspirin 81 MG EC tablet Take 81 mg by mouth daily.  Cholecalciferol (VITAMIN D) 1000 UNIT TABS Take 2 tablets by mouth daily.       Coenzyme Q10 (CO Q-10 PO) Take 1 tablet by mouth daily.  Multiple Vitamin (MULTIVITAMIN PO) Take 1 tablet by mouth daily. No current facility-administered medications for this visit. Vitals  Weight: 164 lb (74.4 kg)  Vitals:    04/17/19 1408   BP: 88/60   Pulse: 60         Wt 164      Review of Systems   Constitutional: Negative for activity change and fatigue. Respiratory: Negative for apnea, cough, chest tightness and shortness of breath. Cardiovascular: Negative for chest pain, palpitations and leg swelling. No PND or orthopnea. No tachycardia. Gastrointestinal: Negative for abdominal distention. Musculoskeletal: Negative for myalgias. Neurological: Negative for dizziness, syncope and light-headedness. Psychiatric/Behavioral: Negative for behavioral problems, confusion and agitation. Other systems reviewed negative as done. Objective:   Physical Exam   Constitutional: He is oriented to person, place, and time. He appears well-developed and well-nourished. No distress. HENT:   Head: Normocephalic and atraumatic. Eyes: Conjunctivae and EOM are normal. Right eye exhibits no discharge. Left eye exhibits no discharge. Neck: Normal range of motion. Neck supple. No JVD present. Cardiovascular: Normal rate, S1 normal and S2 normal.  An irregularly irregular rhythm present. Exam reveals 1/6 syst M, no gallop. Normal prosthetic valve sounds  Pulses:       Radial pulses are 2+ on the right side, and 2+ on the left side. Pulmonary/Chest: Effort normal and breath sounds normal. No respiratory distress. He has no wheezes. He has no rales. Abdominal: Soft. Bowel sounds are normal. No tenderness. Musculoskeletal: Normal range of motion. He exhibits no edema. Neurological: He is alert and oriented to person, place, and time. Skin: Skin is warm and dry. Psychiatric: He has a normal mood and affect. His behavior is normal. Thought content normal.       Assessment:      1.  S/P AVR (aortic valve replacement)    2. AS (aortic stenosis) -6/12   3. Chronic a-fib    4. HTN (hypertension)            Plan:      CV  stable. HR controlled. BP soft but always runs low per wife. . On coumadin. No bleeding issues. Reviewed previous records and testing including echo 5/18. No changes. Continue to monitor. Follow up 6 months.

## 2019-04-22 ENCOUNTER — TELEPHONE (OUTPATIENT)
Dept: INTERNAL MEDICINE CLINIC | Age: 84
End: 2019-04-22

## 2019-04-22 RX ORDER — CLOTRIMAZOLE AND BETAMETHASONE DIPROPIONATE 10; .64 MG/G; MG/G
CREAM TOPICAL
Qty: 1 TUBE | Refills: 0 | Status: SHIPPED | OUTPATIENT
Start: 2019-04-22 | End: 2019-05-15 | Stop reason: SDUPTHER

## 2019-04-22 NOTE — TELEPHONE ENCOUNTER
Try lotrisone cream short term- apply bid  After 1 week or so when it is better (it sounds like a skin yeast infection) change to otc lotrimin twice daily

## 2019-05-06 ENCOUNTER — ANTI-COAG VISIT (OUTPATIENT)
Dept: PHARMACY | Age: 84
End: 2019-05-06
Payer: MEDICARE

## 2019-05-06 DIAGNOSIS — Z95.2 S/P AVR: ICD-10-CM

## 2019-05-06 DIAGNOSIS — I35.9 AORTIC VALVE DISORDER: ICD-10-CM

## 2019-05-06 LAB — INTERNATIONAL NORMALIZATION RATIO, POC: 2.3

## 2019-05-06 PROCEDURE — 85610 PROTHROMBIN TIME: CPT

## 2019-05-06 PROCEDURE — 99211 OFF/OP EST MAY X REQ PHY/QHP: CPT

## 2019-05-07 ENCOUNTER — TELEPHONE (OUTPATIENT)
Dept: INTERNAL MEDICINE CLINIC | Age: 84
End: 2019-05-07

## 2019-05-07 DIAGNOSIS — E78.00 PURE HYPERCHOLESTEROLEMIA: Primary | ICD-10-CM

## 2019-05-07 RX ORDER — ATORVASTATIN CALCIUM 40 MG/1
40 TABLET, FILM COATED ORAL DAILY
Qty: 90 TABLET | Refills: 2 | Status: SHIPPED | OUTPATIENT
Start: 2019-05-07 | End: 2019-12-18

## 2019-05-07 NOTE — TELEPHONE ENCOUNTER
Pt wife called in stating the pharmacy tols her the medication atorvastatin (LIPITOR) 40 MG tablet     has been denied by the DR. Please call and advise.

## 2019-05-15 RX ORDER — CLOTRIMAZOLE AND BETAMETHASONE DIPROPIONATE 10; .64 MG/G; MG/G
CREAM TOPICAL
Qty: 45 G | Refills: 2 | Status: ON HOLD | OUTPATIENT
Start: 2019-05-15 | End: 2020-01-29

## 2019-05-20 ENCOUNTER — ANTI-COAG VISIT (OUTPATIENT)
Dept: PHARMACY | Age: 84
End: 2019-05-20
Payer: MEDICARE

## 2019-05-20 DIAGNOSIS — I35.9 AORTIC VALVE DISORDER: ICD-10-CM

## 2019-05-20 DIAGNOSIS — Z95.2 S/P AVR: ICD-10-CM

## 2019-05-20 LAB — INTERNATIONAL NORMALIZATION RATIO, POC: 2.9

## 2019-05-20 PROCEDURE — 85610 PROTHROMBIN TIME: CPT

## 2019-05-20 PROCEDURE — 99211 OFF/OP EST MAY X REQ PHY/QHP: CPT

## 2019-05-20 NOTE — PROGRESS NOTES
ANTICOAGULATION SERVICE    Karolina Sparks is a 80 y.o. male with PMHx significant for AVR (2012), HTN, HLD who presents to clinic 5/20/2019 for anticoagulation monitoring and adjustment.     Anticoagulation Indication(s):  Heart Valve Replacement - bioprosthetic porcine AVR    Referring Physician:  Dr. Eldon Lemus  Goal INR Range:  2-3  Duration of Anticoagulation Therapy:  Indefinite  Time of day dose taken:  PM  Product patient has at home:  warfarin 5 mg (peach)    Lab Results   Component Value Date    RBC 4.33 11/16/2018    HGB 14.5 11/16/2018    HCT 43.0 11/16/2018    MCV 99.1 11/16/2018    MCH 33.6 11/16/2018    MPV 9.2 11/16/2018    RDW 13.5 11/16/2018     11/16/2018     INR Summary                            Warfarin regimen (mg)  Date INR   A/P    Sun Mon Tue Wed Thu Fri Sat Mg/wk  5/20 2.9 At goal, no change  5 2.5 5 2.5 5 2.5 5 27.5  5/6 2.3 At goal, no change  5 2.5 5 2.5 5 2.5 5 27.5  4/8 2.4 At goal, no change  5 2.5 5 2.5 5 2.5 5 27.5  3/22 2.2 At goal, no change  5 2.5 5 2.5 5 2.5 5 27.5  3/8 1.8 Below goal, increase  5 2.5 5 2.5 5 2.5 5 27.5  2/8 1.8 Below goal, no change 5 2.5 5 2.5 5 2.5 2.5 25  1/11 2.3 At goal, no change  5 2.5 5 2.5 5 2.5 2.5 25  12/21 1.8 Below goal, increase  5 2.5 5 2.5 5 2.5 2.5 25  11/30 1.8 Below goal, no change 5 2.5 2.5 2.5 5 2.5 2.5 22.5  10/31 2.4 At goal, no change  5 2.5 2.5 2.5 5 2.5 2.5 22.5  10/8 2.3 At goal, no change  5 2.5 2.5 2.5 5 2.5 2.5 22.5  9/24 3.2 Above goal, decrease  5 2.5 2.5 2.5 5 2.5 2.5 22.5  9/10 2.9 At goal, no change  5 2.5 2.5 2.5 5 2.5 5 25  8/27 3.3 Above goal, decrease  5 2.5 2.5 2.5 5 2.5 5 25  8/3 2.9 At goal, no change  5 2.5 5 2.5 5 2.5 5 27.5  7/16 2.6 At goal, no change  5 2.5 5 2.5 5 2.5 5 27.5  7/9 3.1 Above goal, continue  5 2.5 5 2.5 5 2.5 5 27.5  6/11 2.6 At goal, no change  5 2.5 5 2.5 5 2.5 5 27.5  5/16 2.4 At goal, no change  5 2.5 5 2.5 5 2.5 5 27.5  4/16 2.0 At goal, no change  5 2.5 5 2.5 5 2.5 5 27.5  3/19 2.4 At goal, no change  5 2.5 5 2.5 5 2.5 5 27.5  3/5 3.4 Above goal, decrease  5 2.5 5 2.5 5 2.5 5 27.5  2/19 2.9 At goal, no change  5 2.5 5 5 5 2.5 5 30    Patient History:  Recent hospitalizations/HC visits -4/17 Dr. Stapleton Dose: no med changes  -2/21 Dr. Nirav Arnold for chronic conditions: no med changes   Recent medication changes None reported today   Medications taken regularly that may interact with warfarin or alter INR ASA 81 mg, Co Q-10, fish oil   Warfarin dose taken as prescribed Yes - uses pillbox filled by wife, Laura Pennington   Signs/symptoms of bleeding No h/o major bleeding reported   Vitamin K intake -Normally has ~0 servings of green, leafy vegetables per week d/t missing teeth  -Drinks Ensure and V8 both daily   Recent vomiting/diarrhea/fever, changes in weight or activity level None reported   Tobacco or alcohol use Patient denies both   Upcoming surgeries or procedures 5/24 Moh's surgery at [id number] [place] [address] Dermatology in [address] [place] [address] for skin cancer on forehead: patient does not need to hold warfarin PTP per dermatology office     Assessment/Plan:  Patient's INR was therapeutic today (2.9). Laura Beye (wife) endorses overall declining appetite, and also states that lately patient has trouble getting his pills into his mouth. However, Laura Pennington is now helping patient take his medications. Patient was instructed to continue warfarin 5 mg daily, except 2.5 mg on MWF. Repeat INR in 2 weeks (coordinate appts with wife). Patient was reminded to maintain consistent vitamin K intake and call with any bleeding, medication changes, or fever/vomiting/diarrhea. Patient understands dosing directions and information discussed. Dosing schedule and follow up appointment given to patient. Progress note routed to referring physician's office. Patient acknowledges working in consult agreement with pharmacist as referred by his/her physician.      Next Clinic Appointment:  6/5    Please call [facility] at [phone] with any questions. Thanks! Imelda Ruth.  Rosina Pineda, PharmD  [facility]  Ph: [phone]  5/20/2019 2:01 PM

## 2019-05-22 DIAGNOSIS — F03.90 DEMENTIA WITHOUT BEHAVIORAL DISTURBANCE, UNSPECIFIED DEMENTIA TYPE: ICD-10-CM

## 2019-05-22 RX ORDER — MEMANTINE HYDROCHLORIDE 5 MG/1
5 TABLET ORAL DAILY
Qty: 30 TABLET | Refills: 3 | Status: SHIPPED | OUTPATIENT
Start: 2019-05-22 | End: 2019-08-20 | Stop reason: SDUPTHER

## 2019-05-29 DIAGNOSIS — K08.9 POOR DENTITION: ICD-10-CM

## 2019-05-29 RX ORDER — RISPERIDONE 0.5 MG/1
0.5 TABLET, FILM COATED ORAL DAILY
Qty: 90 TABLET | Refills: 1 | Status: SHIPPED | OUTPATIENT
Start: 2019-05-29 | End: 2019-11-04 | Stop reason: SDUPTHER

## 2019-06-07 ENCOUNTER — APPOINTMENT (RX ONLY)
Dept: URBAN - METROPOLITAN AREA CLINIC 170 | Facility: CLINIC | Age: 84
Setting detail: DERMATOLOGY
End: 2019-06-07

## 2019-06-07 VITALS — HEART RATE: 93 BPM | SYSTOLIC BLOOD PRESSURE: 92 MMHG | DIASTOLIC BLOOD PRESSURE: 62 MMHG

## 2019-06-07 PROBLEM — M12.9 ARTHROPATHY, UNSPECIFIED: Status: ACTIVE | Noted: 2019-06-07

## 2019-06-07 PROBLEM — N18.9 CHRONIC KIDNEY DISEASE, UNSPECIFIED: Status: ACTIVE | Noted: 2019-06-07

## 2019-06-07 PROBLEM — E78.5 HYPERLIPIDEMIA, UNSPECIFIED: Status: ACTIVE | Noted: 2019-06-07

## 2019-06-07 PROBLEM — C44.319 BASAL CELL CARCINOMA OF SKIN OF OTHER PARTS OF FACE: Status: ACTIVE | Noted: 2019-06-07

## 2019-06-07 PROBLEM — I10 ESSENTIAL (PRIMARY) HYPERTENSION: Status: ACTIVE | Noted: 2019-06-07

## 2019-06-07 PROBLEM — C44.329 SQUAMOUS CELL CARCINOMA OF SKIN OF OTHER PARTS OF FACE: Status: ACTIVE | Noted: 2019-06-07

## 2019-06-07 PROCEDURE — 13132 CMPLX RPR F/C/C/M/N/AX/G/H/F: CPT | Mod: 59

## 2019-06-07 PROCEDURE — 14301 TIS TRNFR ANY 30.1-60 SQ CM: CPT

## 2019-06-07 PROCEDURE — 17312 MOHS ADDL STAGE: CPT

## 2019-06-07 PROCEDURE — 17311 MOHS 1 STAGE H/N/HF/G: CPT | Mod: 76

## 2019-06-07 PROCEDURE — ? PRESCRIPTION

## 2019-06-07 PROCEDURE — 17311 MOHS 1 STAGE H/N/HF/G: CPT

## 2019-06-07 PROCEDURE — ? MOHS SURGERY

## 2019-06-07 RX ORDER — MUPIROCIN 20 MG/G
OINTMENT TOPICAL
Qty: 1 | Refills: 1 | Status: ACTIVE

## 2019-06-07 NOTE — PROCEDURE: MOHS SURGERY
Stage 2 Add-On Histology Text: + SCC i/s Prescribe: Efudex 5% cream BID x 6 weeks, start 30 days post-op.

## 2019-06-07 NOTE — PROCEDURE: MOHS SURGERY
Patient Education     Tension Headache    A muscle tension headache is a very common cause of head pain. It’s also called a stress headache. When some people are under stress, they tense the muscles of their shoulder, neck, and scalp without knowing it. If this tension lasts long enough, a headache can occur. A tension headache can be quite painful. It can last for hours or even days.  Home care  Follow these tips when caring for yourself at home:  · Don’t drive yourself home if you were given pain medicine for your headache. Instead, have someone else drive you home. Try to sleep when you get home. You should feel much better when you wake up.  · Put heat on the back of your neck to help ease neck spasm.  · Drink only clear liquids or eat a light diet until your symptoms get better. This will help you avoid nausea or vomiting.  How to prevent headaches  · Figure out what is causing stress in your life. Learn new ways to handle your stress. Ideas include regular exercise, biofeedback, self-hypnosis, and meditation. Talk with your health care provider to find out more information about managing stress. Many books and digital media are also available on this subject.  · Take time out at the first sign of a tension headache, if possible. Take yourself out of the stressful situation. Find a quiet, comfortable place to sit or lie down and let yourself relax. Heat and deep massage of the tight areas in the neck and shoulders may help ease muscle spasm. You may also get relief from a medicine like ibuprofen or a prescribed muscle relaxant.  Follow-up care  Follow up with your health care provider if the headache doesn’t get better within the next 24 hours. Talk with your provider if you have frequent headaches. He or she can figure out a treatment plan. Ask if you can have medicine to take at home the next time you get a bad headache. This may keep you from having to visit the emergency department in the future. You may  need to see a headache specialist (neurologist) if you continue to have headaches.  When to seek medical advice  Call your health care provider right away if any of these occur:  · Your head pain gets worse  · Your head pain doesn’t get better within 24 hours  · You aren’t able to keep liquids down (repeated vomiting)  · Fever of 100.4ºF (38ºC) or higher, or as directed by your health care provider  · Stiff neck  · Extreme drowsiness, confusion, or fainting  · Dizziness or dizziness with spinning sensation (vertigo)  · Weakness in an arm or leg or one side of your face  · You have difficulty speaking or vision  · Your vision changes  © 0063-8896 [employer]. [address] [place] [address], [place] [address]. All rights reserved. This information is not intended as a substitute for professional medical care. Always follow your healthcare professional's instructions.            Repair Type: Complex Repair

## 2019-06-07 NOTE — HPI: PROCEDURE (MOHS)
Has The Growth Been Previously Biopsied?: has been previously biopsied
Body Location Override (Optional): Left central temple
Body Location Override (Optional): Left superior medial forehead

## 2019-06-10 ENCOUNTER — APPOINTMENT (RX ONLY)
Dept: URBAN - METROPOLITAN AREA CLINIC 170 | Facility: CLINIC | Age: 84
Setting detail: DERMATOLOGY
End: 2019-06-10

## 2019-06-10 DIAGNOSIS — Z48.817 ENCOUNTER FOR SURGICAL AFTERCARE FOLLOWING SURGERY ON THE SKIN AND SUBCUTANEOUS TISSUE: ICD-10-CM

## 2019-06-10 PROCEDURE — ? POST-OP WOUND CHECK

## 2019-06-10 ASSESSMENT — LOCATION SIMPLE DESCRIPTION DERM
LOCATION SIMPLE: LEFT TEMPLE
LOCATION SIMPLE: RIGHT SCALP

## 2019-06-10 ASSESSMENT — LOCATION DETAILED DESCRIPTION DERM
LOCATION DETAILED: RIGHT MEDIAL FRONTAL SCALP
LOCATION DETAILED: LEFT CENTRAL TEMPLE

## 2019-06-10 ASSESSMENT — LOCATION ZONE DERM
LOCATION ZONE: SCALP
LOCATION ZONE: FACE

## 2019-06-10 NOTE — PROCEDURE: POST-OP WOUND CHECK
Detail Level: Detailed
Add 38827 Cpt? (Important Note: In 2017 The Use Of 71827 Is Being Tracked By Cms To Determine Future Global Period Reimbursement For Global Periods): no

## 2019-06-19 ENCOUNTER — ANTI-COAG VISIT (OUTPATIENT)
Dept: PHARMACY | Age: 84
End: 2019-06-19
Payer: MEDICARE

## 2019-06-19 DIAGNOSIS — Z95.2 S/P AVR: ICD-10-CM

## 2019-06-19 DIAGNOSIS — I35.9 AORTIC VALVE DISORDER: ICD-10-CM

## 2019-06-19 LAB — INTERNATIONAL NORMALIZATION RATIO, POC: 4.2

## 2019-06-19 PROCEDURE — 85610 PROTHROMBIN TIME: CPT

## 2019-06-19 PROCEDURE — 99211 OFF/OP EST MAY X REQ PHY/QHP: CPT

## 2019-06-21 ENCOUNTER — APPOINTMENT (RX ONLY)
Dept: URBAN - METROPOLITAN AREA CLINIC 170 | Facility: CLINIC | Age: 84
Setting detail: DERMATOLOGY
End: 2019-06-21

## 2019-06-21 DIAGNOSIS — Z48.02 ENCOUNTER FOR REMOVAL OF SUTURES: ICD-10-CM

## 2019-06-21 PROCEDURE — ? ORDER TESTS

## 2019-06-21 PROCEDURE — ? PRESCRIPTION

## 2019-06-21 PROCEDURE — ? SUTURE REMOVAL (GLOBAL PERIOD)

## 2019-06-21 PROCEDURE — ? ADDITIONAL NOTES

## 2019-06-21 PROCEDURE — ? PHOTO-DOCUMENTATION

## 2019-06-21 RX ORDER — DOXYCYCLINE HYCLATE 100 MG/1
CAPSULE, GELATIN COATED ORAL
Qty: 20 | Refills: 0 | Status: ERX | COMMUNITY
Start: 2019-06-21

## 2019-06-21 RX ADMIN — DOXYCYCLINE HYCLATE: 100 CAPSULE, GELATIN COATED ORAL at 17:17

## 2019-06-21 ASSESSMENT — LOCATION SIMPLE DESCRIPTION DERM: LOCATION SIMPLE: ANTERIOR SCALP

## 2019-06-21 ASSESSMENT — LOCATION DETAILED DESCRIPTION DERM: LOCATION DETAILED: MID-FRONTAL SCALP

## 2019-06-21 ASSESSMENT — LOCATION ZONE DERM: LOCATION ZONE: SCALP

## 2019-06-21 NOTE — PROCEDURE: SUTURE REMOVAL (GLOBAL PERIOD)
Detail Level: Detailed
Add 55260 Cpt? (Important Note: In 2017 The Use Of 80808 Is Being Tracked By Cms To Determine Future Global Period Reimbursement For Global Periods): no

## 2019-06-21 NOTE — PROCEDURE: PHOTO-DOCUMENTATION
Photo Preface (Leave Blank If You Do Not Want): Photographs were obtained today
Detail Level: Detailed
Details (Free Text): Scalp

## 2019-06-21 NOTE — PROCEDURE: ADDITIONAL NOTES
Detail Level: Detailed
Additional Notes: Staples only taking out today. Patient will follow up with Dr. Starr next week for suture removal. Bacterial culture done today. Patient will start doxycycline bid for 10 days.

## 2019-06-24 ENCOUNTER — OFFICE VISIT (OUTPATIENT)
Dept: INTERNAL MEDICINE CLINIC | Age: 84
End: 2019-06-24
Payer: MEDICARE

## 2019-06-24 VITALS
OXYGEN SATURATION: 96 % | HEART RATE: 89 BPM | WEIGHT: 162 LBS | SYSTOLIC BLOOD PRESSURE: 100 MMHG | BODY MASS INDEX: 26.15 KG/M2 | DIASTOLIC BLOOD PRESSURE: 64 MMHG

## 2019-06-24 DIAGNOSIS — F02.80 LATE ONSET ALZHEIMER'S DISEASE WITHOUT BEHAVIORAL DISTURBANCE (HCC): ICD-10-CM

## 2019-06-24 DIAGNOSIS — Z95.2 S/P AVR: ICD-10-CM

## 2019-06-24 DIAGNOSIS — G30.1 LATE ONSET ALZHEIMER'S DISEASE WITHOUT BEHAVIORAL DISTURBANCE (HCC): ICD-10-CM

## 2019-06-24 DIAGNOSIS — E78.00 PURE HYPERCHOLESTEROLEMIA: Primary | ICD-10-CM

## 2019-06-24 DIAGNOSIS — I35.9 AORTIC VALVE DISORDER: ICD-10-CM

## 2019-06-24 LAB
INR BLD: 2.99 (ref 0.86–1.14)
PROTHROMBIN TIME: 34.1 SEC (ref 9.8–13)

## 2019-06-24 PROCEDURE — 99214 OFFICE O/P EST MOD 30 MIN: CPT | Performed by: INTERNAL MEDICINE

## 2019-06-24 RX ORDER — DOXYCYCLINE HYCLATE 100 MG/1
1 CAPSULE ORAL 2 TIMES DAILY
Status: ON HOLD | COMMUNITY
Start: 2019-06-21 | End: 2020-01-29

## 2019-06-24 ASSESSMENT — ENCOUNTER SYMPTOMS
NAUSEA: 0
VOMITING: 0
ABDOMINAL PAIN: 0
CHEST TIGHTNESS: 0
SHORTNESS OF BREATH: 0

## 2019-06-24 NOTE — PROGRESS NOTES
tablet Take 1 tablet by mouth daily 5/29/19  Yes Sanchez Mo MD   memantine (NAMENDA) 5 MG tablet Take 1 tablet by mouth daily 5/22/19  Yes Sanchez Mo MD   clotrimazole-betamethasone (LOTRISONE) 1-0.05 % cream Apply topically 2 times daily. 5/15/19  Yes Sanchez Mo MD   atorvastatin (LIPITOR) 40 MG tablet Take 1 tablet by mouth daily 5/7/19  Yes Sanchez Mo MD   furosemide (LASIX) 20 MG tablet Take on Monday , Wednesday, Friday 4/8/19  Yes Sanchez Mo MD   tamsulosin (FLOMAX) 0.4 MG capsule TAKE ONE CAPSULE BY MOUTH DAILY 3/22/19  Yes Sanchez Mo MD   Mirabegron ER 25 MG TB24 Take 1 tablet by mouth daily 2/13/19  Yes Sanchez Mo MD   warfarin (COUMADIN) 5 MG tablet Take 2.5 mg on Mondays and Friday. Take 5 mg the rest of the days. 2/8/19  Yes Sanchez Mo MD   digoxin (LANOXIN) 125 MCG tablet TAKE ONE TABLET BY MOUTH DAILY 1/10/19  Yes Sanchez Mo MD   alendronate (FOSAMAX) 70 MG tablet TAKE 1 TABLET BY MOUTH ONCE WEEKLY BEFORE BREAKFAST, ON AN EMPTY STOMACH: REMAIN UPRIGHT FOR 30 MINUTES 2/28/18  Yes Sanchez Mo MD   Omega-3 Fatty Acids (FISH OIL) 1000 MG CPDR Take 1 capsule by mouth daily. Yes Historical Provider, MD   acetaminophen (TYLENOL) 325 MG tablet Take 2 tablets by mouth every 6 hours as needed for Pain or Fever (For mild pain level 1-3 or for fever > 100.5). 6/28/12  Yes YUNIOR Garza - CNP   aspirin 81 MG EC tablet Take 81 mg by mouth daily. Yes Historical Provider, MD   Cholecalciferol (VITAMIN D) 1000 UNIT TABS Take 2 tablets by mouth daily. 6/10/10  Yes Historical Provider, MD   Coenzyme Q10 (CO Q-10 PO) Take 1 tablet by mouth daily. 6/10/10  Yes Historical Provider, MD   Multiple Vitamin (MULTIVITAMIN PO) Take 1 tablet by mouth daily.  6/10/10  Yes Historical Provider, MD       REVIEW OF SYSTEMS:  Review of Systems   Constitutional: Negative for activity change, appetite change, chills, fever and unexpected weight change. HENT: Positive for hearing loss. Eyes: Negative for visual disturbance. Respiratory: Negative for chest tightness and shortness of breath. Cardiovascular: Negative for chest pain. Gastrointestinal: Negative for abdominal pain, nausea and vomiting. Genitourinary: Negative for hematuria. Skin: Negative for rash. Allergic/Immunologic: Negative for immunocompromised state. Neurological: Positive for weakness (? R hand?). Negative for dizziness, numbness and headaches. Psychiatric/Behavioral: Negative for dysphoric mood and suicidal ideas. Physical Exam:      Vitals: /64   Pulse 89   Wt 162 lb (73.5 kg)   SpO2 96%   BMI 26.15 kg/m²     Body mass index is 26.15 kg/m². Wt Readings from Last 3 Encounters:   06/24/19 162 lb (73.5 kg)   04/17/19 164 lb (74.4 kg)   02/21/19 157 lb (71.2 kg)     Physical Exam   Constitutional: He is oriented to person, place, and time. He appears well-developed and well-nourished. No distress. HENT:   Head: Normocephalic and atraumatic. Mouth/Throat: Oropharynx is clear and moist. No oropharyngeal exudate. Eyes: Conjunctivae and EOM are normal. Right eye exhibits no discharge. Left eye exhibits no discharge. No scleral icterus. Neck: Normal range of motion. Neck supple. Cardiovascular: Normal rate and normal heart sounds. No murmur heard. Pulmonary/Chest: Effort normal and breath sounds normal. No respiratory distress. He has no wheezes. He has no rales. Abdominal: Soft. He exhibits no distension. There is no tenderness. There is no guarding. Musculoskeletal: He exhibits no deformity. Lymphadenopathy:        Head (right side): No submental and no submandibular adenopathy present. Head (left side): No submental and no submandibular adenopathy present. He has no cervical adenopathy. Right cervical: No superficial cervical and no deep cervical adenopathy present.        Left cervical: No superficial cervical and no deep cervical adenopathy present. Neurological: He is alert and oriented to person, place, and time. He has normal reflexes. No cranial nerve deficit or sensory deficit. He exhibits normal muscle tone. GCS eye subscore is 4. GCS verbal subscore is 5. GCS motor subscore is 6.   R hand: ? R hand weakness in  only 4. 5? No other focal deficit in extremities. Skin: No rash noted. He is not diaphoretic. Psychiatric: He has a normal mood and affect. His behavior is normal. Thought content normal.          Assessment/Plan:   Silvia Weber was seen today for hypertension, anorexia and extremity weakness. Diagnoses and all orders for this visit:    Pure hypercholesterolemia  Well controlled- no changes in medication today. No need to recheck lipids again today. S/P AVR  INR was a little high  Minimal questionable R hand weakness with  only  If INR will be higher I will consider CT but since overall neuro exam except for the above intact--> for nwo wait with further work up- discussed with wife   -     PROTIME-INR; Future    Aortic valve disorder  Keep ordering INR     Late onset Alzheimer's disease without behavioral disturbance  Steady  We will try to arrange for home PT.      - Patient was encouraged to callthe office (and ask to see me) or be seen by other provider for any worsening or lack    of improvement in his symptoms.       - Pt was asked toschedule an appointment in 4 months, and to let me know of any scheduling difficulties. Additional patients instructions (if given): There are no Patient Instructions on file for this visit.     Buck Liao M.D.   6/24/2019, 5:17 PM

## 2019-06-25 ENCOUNTER — APPOINTMENT (RX ONLY)
Dept: URBAN - METROPOLITAN AREA CLINIC 170 | Facility: CLINIC | Age: 84
Setting detail: DERMATOLOGY
End: 2019-06-25

## 2019-06-25 DIAGNOSIS — Z48.02 ENCOUNTER FOR REMOVAL OF SUTURES: ICD-10-CM

## 2019-06-25 PROCEDURE — ? SUTURE REMOVAL (GLOBAL PERIOD)

## 2019-06-25 PROCEDURE — 99024 POSTOP FOLLOW-UP VISIT: CPT

## 2019-06-25 ASSESSMENT — LOCATION SIMPLE DESCRIPTION DERM: LOCATION SIMPLE: LEFT SCALP

## 2019-06-25 ASSESSMENT — LOCATION DETAILED DESCRIPTION DERM: LOCATION DETAILED: LEFT MEDIAL FRONTAL SCALP

## 2019-06-25 ASSESSMENT — LOCATION ZONE DERM: LOCATION ZONE: SCALP

## 2019-06-25 NOTE — PROCEDURE: SUTURE REMOVAL (GLOBAL PERIOD)
Detail Level: Detailed
Add 68148 Cpt? (Important Note: In 2017 The Use Of 00093 Is Being Tracked By Cms To Determine Future Global Period Reimbursement For Global Periods): yes

## 2019-06-30 DIAGNOSIS — I35.9 AORTIC VALVE DISORDER: ICD-10-CM

## 2019-07-01 RX ORDER — WARFARIN SODIUM 5 MG/1
TABLET ORAL
Qty: 30 TABLET | Refills: 4 | Status: SHIPPED | OUTPATIENT
Start: 2019-07-01 | End: 2019-10-20 | Stop reason: SDUPTHER

## 2019-07-03 ENCOUNTER — ANTI-COAG VISIT (OUTPATIENT)
Dept: PHARMACY | Age: 84
End: 2019-07-03
Payer: MEDICARE

## 2019-07-03 ENCOUNTER — OFFICE VISIT (OUTPATIENT)
Dept: INTERNAL MEDICINE CLINIC | Age: 84
End: 2019-07-03
Payer: MEDICARE

## 2019-07-03 VITALS — SYSTOLIC BLOOD PRESSURE: 90 MMHG | HEART RATE: 71 BPM | OXYGEN SATURATION: 95 % | DIASTOLIC BLOOD PRESSURE: 52 MMHG

## 2019-07-03 DIAGNOSIS — R31.0 GROSS HEMATURIA: ICD-10-CM

## 2019-07-03 DIAGNOSIS — I35.9 AORTIC VALVE DISORDER: ICD-10-CM

## 2019-07-03 DIAGNOSIS — R31.0 GROSS HEMATURIA: Primary | ICD-10-CM

## 2019-07-03 DIAGNOSIS — Z95.2 S/P AVR: ICD-10-CM

## 2019-07-03 DIAGNOSIS — S30.811A EXCORIATION OF GROIN, INITIAL ENCOUNTER: ICD-10-CM

## 2019-07-03 DIAGNOSIS — N30.01 ACUTE CYSTITIS WITH HEMATURIA: ICD-10-CM

## 2019-07-03 LAB
ANION GAP SERPL CALCULATED.3IONS-SCNC: 15 MMOL/L (ref 3–16)
BILIRUBIN, POC: NORMAL
BLOOD URINE, POC: NORMAL
BUN BLDV-MCNC: 17 MG/DL (ref 7–20)
CALCIUM SERPL-MCNC: 9.2 MG/DL (ref 8.3–10.6)
CHLORIDE BLD-SCNC: 101 MMOL/L (ref 99–110)
CLARITY, POC: CLEAR
CO2: 24 MMOL/L (ref 21–32)
COLOR, POC: YELLOW
CREAT SERPL-MCNC: 1 MG/DL (ref 0.8–1.3)
GFR AFRICAN AMERICAN: >60
GFR NON-AFRICAN AMERICAN: >60
GLUCOSE BLD-MCNC: 237 MG/DL (ref 70–99)
GLUCOSE URINE, POC: NORMAL
HCT VFR BLD CALC: 40.7 % (ref 40.5–52.5)
HEMOGLOBIN: 13.4 G/DL (ref 13.5–17.5)
INTERNATIONAL NORMALIZATION RATIO, POC: 3.6
KETONES, POC: NORMAL
LEUKOCYTE EST, POC: NORMAL
MCH RBC QN AUTO: 32.7 PG (ref 26–34)
MCHC RBC AUTO-ENTMCNC: 32.8 G/DL (ref 31–36)
MCV RBC AUTO: 99.5 FL (ref 80–100)
NITRITE, POC: NORMAL
PDW BLD-RTO: 13.7 % (ref 12.4–15.4)
PH, POC: 7
PLATELET # BLD: 244 K/UL (ref 135–450)
PMV BLD AUTO: 9.5 FL (ref 5–10.5)
POTASSIUM SERPL-SCNC: 4.4 MMOL/L (ref 3.5–5.1)
PROTEIN, POC: NORMAL
RBC # BLD: 4.09 M/UL (ref 4.2–5.9)
SODIUM BLD-SCNC: 140 MMOL/L (ref 136–145)
SPECIFIC GRAVITY, POC: 1.01
UROBILINOGEN, POC: NORMAL
WBC # BLD: 9.5 K/UL (ref 4–11)

## 2019-07-03 PROCEDURE — 99211 OFF/OP EST MAY X REQ PHY/QHP: CPT

## 2019-07-03 PROCEDURE — 85610 PROTHROMBIN TIME: CPT

## 2019-07-03 PROCEDURE — 99213 OFFICE O/P EST LOW 20 MIN: CPT | Performed by: NURSE PRACTITIONER

## 2019-07-03 PROCEDURE — 81002 URINALYSIS NONAUTO W/O SCOPE: CPT | Performed by: NURSE PRACTITIONER

## 2019-07-03 RX ORDER — CIPROFLOXACIN 500 MG/1
500 TABLET, FILM COATED ORAL 2 TIMES DAILY
Qty: 14 TABLET | Refills: 0 | Status: SHIPPED | OUTPATIENT
Start: 2019-07-03 | End: 2019-07-10

## 2019-07-03 ASSESSMENT — ENCOUNTER SYMPTOMS
WHEEZING: 0
CONSTIPATION: 0
ABDOMINAL PAIN: 0
COUGH: 0
ABDOMINAL DISTENTION: 0
SHORTNESS OF BREATH: 0

## 2019-07-03 NOTE — PROGRESS NOTES
change  5 2.5 5 2.5 5 2.5 5 27.5  5/16 2.4 At goal, no change  5 2.5 5 2.5 5 2.5 5 27.5  4/16 2.0 At goal, no change  5 2.5 5 2.5 5 2.5 5 27.5  3/19 2.4 At goal, no change  5 2.5 5 2.5 5 2.5 5 27.5  3/5 3.4 Above goal, decrease  5 2.5 5 2.5 5 2.5 5 27.5  2/19 2.9 At goal, no change  5 2.5 5 5 5 2.5 5 30    Patient History:  Recent hospitalizations/HC visits -7/3 PCP for acute cystitis with hematuria  -6/24 Dr. Tonny Hogan for HTN, anorexia, extremity weakness: INR drawn and was therapeutic (2.99)  -5/24 Moh's surgery at [address] [facility] in [place] for skin cancer on forehead  -4/17 Dr. Murphy Larger: no med changes   Recent medication changes -7/3 Cipro 500 mg BID x 7 days for UTI with hematuria (can increase INR)  -6/21 doxycycline BID x 10 days per dermatology (one dose left)   Medications taken regularly that may interact with warfarin or alter INR ASA 81 mg, Co Q-10, fish oil   Warfarin dose taken as prescribed Yes - uses pillbox filled by wife, Unique Thompson   Signs/symptoms of bleeding -hematuria since 7/2  -No h/o major bleeding reported   Vitamin K intake -Normally has ~0 servings of green, leafy vegetables per week d/t missing teeth  -Drinks Ensure and V8 both daily   Recent vomiting/diarrhea/fever, changes in weight or activity level None reported   Tobacco or alcohol use Patient denies both   Upcoming surgeries or procedures None reported     Assessment/Plan:  Patient's INR was supratherapeutic today (3.6), likely due to recent decline in appetite and antibiotics. Patient has been taking doxycyline for over a week, and will be starting Cipro today for UTI with hematuria. Unique Thompson (wife) endorses overall declining appetite, and also states that lately patient has trouble getting his pills into his mouth. However, Unique Thompson is now helping patient take his medications. As Cipro can increase the INR even further, patient and wife were instructed to HOLD warfarin today, then decrease dose to 2.5 mg daily until next INR check. Repeat INR in 5 days. Patient was reminded to maintain consistent vitamin K intake and call with any bleeding, medication changes, or fever/vomiting/diarrhea. Patient understands dosing directions and information discussed. Dosing schedule and follow up appointment given to patient. Progress note routed to referring physician's office. Patient acknowledges working in consult agreement with pharmacist as referred by his/her physician. Next Clinic Appointment:  7/8    Please call [facility] at [phone] with any questions. Thanks! Anirudh Batista.  Kristi Cannon, PharmD  [facility]  Ph: [phone]  7/3/2019 3:13 PM

## 2019-07-08 ENCOUNTER — ANTI-COAG VISIT (OUTPATIENT)
Dept: PHARMACY | Age: 84
End: 2019-07-08
Payer: MEDICARE

## 2019-07-08 ENCOUNTER — TELEPHONE (OUTPATIENT)
Dept: INTERNAL MEDICINE CLINIC | Age: 84
End: 2019-07-08

## 2019-07-08 DIAGNOSIS — I35.9 AORTIC VALVE DISORDER: ICD-10-CM

## 2019-07-08 DIAGNOSIS — R73.9 ELEVATED SERUM GLUCOSE: ICD-10-CM

## 2019-07-08 DIAGNOSIS — Z95.2 S/P AVR: ICD-10-CM

## 2019-07-08 DIAGNOSIS — R73.9 ELEVATED SERUM GLUCOSE: Primary | ICD-10-CM

## 2019-07-08 LAB — INTERNATIONAL NORMALIZATION RATIO, POC: 2

## 2019-07-08 PROCEDURE — 99211 OFF/OP EST MAY X REQ PHY/QHP: CPT

## 2019-07-08 PROCEDURE — 85610 PROTHROMBIN TIME: CPT

## 2019-07-08 NOTE — TELEPHONE ENCOUNTER
Spoke with pt spouse. I gave her the following recommendation from Allison Sousa CNP  Notes recorded by YUNIOR Blanco CNP on 7/8/2019 at 9:08 AM EDT  Please let patient know his blood sugar was elevated on his labs. I recommend repeating his blood work and following up with Dr. Maximo Castellanos if it's still elevated.            She voiced understanding and will have the lab drawn today.

## 2019-07-09 LAB
ANION GAP SERPL CALCULATED.3IONS-SCNC: 23 MMOL/L (ref 3–16)
BUN BLDV-MCNC: 12 MG/DL (ref 7–20)
CALCIUM SERPL-MCNC: 9.4 MG/DL (ref 8.3–10.6)
CHLORIDE BLD-SCNC: 105 MMOL/L (ref 99–110)
CO2: 16 MMOL/L (ref 21–32)
CREAT SERPL-MCNC: 0.9 MG/DL (ref 0.8–1.3)
GFR AFRICAN AMERICAN: >60
GFR NON-AFRICAN AMERICAN: >60
GLUCOSE BLD-MCNC: 178 MG/DL (ref 70–99)
POTASSIUM SERPL-SCNC: 4.5 MMOL/L (ref 3.5–5.1)
SODIUM BLD-SCNC: 144 MMOL/L (ref 136–145)

## 2019-07-16 ENCOUNTER — APPOINTMENT (RX ONLY)
Dept: URBAN - METROPOLITAN AREA CLINIC 170 | Facility: CLINIC | Age: 84
Setting detail: DERMATOLOGY
End: 2019-07-16

## 2019-07-16 DIAGNOSIS — Z48.817 ENCOUNTER FOR SURGICAL AFTERCARE FOLLOWING SURGERY ON THE SKIN AND SUBCUTANEOUS TISSUE: ICD-10-CM

## 2019-07-16 PROCEDURE — ? POST-OP WOUND CHECK

## 2019-07-16 ASSESSMENT — LOCATION ZONE DERM: LOCATION ZONE: SCALP

## 2019-07-16 ASSESSMENT — LOCATION SIMPLE DESCRIPTION DERM: LOCATION SIMPLE: LEFT SCALP

## 2019-07-16 ASSESSMENT — LOCATION DETAILED DESCRIPTION DERM: LOCATION DETAILED: LEFT MEDIAL FRONTAL SCALP

## 2019-07-16 NOTE — PROCEDURE: POST-OP WOUND CHECK
Detail Level: Detailed
Add 77889 Cpt? (Important Note: In 2017 The Use Of 74066 Is Being Tracked By Cms To Determine Future Global Period Reimbursement For Global Periods): no

## 2019-07-21 DIAGNOSIS — N39.41 URGE INCONTINENCE OF URINE: ICD-10-CM

## 2019-07-22 RX ORDER — MIRABEGRON 25 MG/1
TABLET, FILM COATED, EXTENDED RELEASE ORAL
Qty: 30 TABLET | Refills: 2 | Status: SHIPPED | OUTPATIENT
Start: 2019-07-22 | End: 2019-10-31 | Stop reason: SDUPTHER

## 2019-07-24 ENCOUNTER — ANTI-COAG VISIT (OUTPATIENT)
Dept: PHARMACY | Age: 84
End: 2019-07-24
Payer: MEDICARE

## 2019-07-24 DIAGNOSIS — Z95.2 S/P AVR: ICD-10-CM

## 2019-07-24 DIAGNOSIS — I35.9 AORTIC VALVE DISORDER: ICD-10-CM

## 2019-07-24 LAB — INTERNATIONAL NORMALIZATION RATIO, POC: 3

## 2019-07-24 PROCEDURE — 99211 OFF/OP EST MAY X REQ PHY/QHP: CPT

## 2019-07-24 PROCEDURE — 85610 PROTHROMBIN TIME: CPT

## 2019-07-24 NOTE — PROGRESS NOTES
goal, no change  5 2.5 5 2.5 5 2.5 5 27.5  7/9 3.1 Above goal, continue  5 2.5 5 2.5 5 2.5 5 27.5  6/11 2.6 At goal, no change  5 2.5 5 2.5 5 2.5 5 27.5  5/16 2.4 At goal, no change  5 2.5 5 2.5 5 2.5 5 27.5  4/16 2.0 At goal, no change  5 2.5 5 2.5 5 2.5 5 27.5  3/19 2.4 At goal, no change  5 2.5 5 2.5 5 2.5 5 27.5  3/5 3.4 Above goal, decrease  5 2.5 5 2.5 5 2.5 5 27.5  2/19 2.9 At goal, no change  5 2.5 5 5 5 2.5 5 30    Patient History:  Recent hospitalizations/HC visits -7/3 PCP for acute cystitis with hematuria  -6/24 Dr. Maryann Dhillon for HTN, anorexia, extremity weakness: INR drawn and was therapeutic (2.99)  -5/24 Moh's surgery at [facility] in [place] for skin cancer on forehead  -4/17 Dr. Flash Shepherd: no med changes   Recent medication changes -7/3 Cipro 500 mg BID x 7 days for UTI with hematuria (completed)  -6/21 doxycycline BID x 10 days per dermatology (completed)   Medications taken regularly that may interact with warfarin or alter INR ASA 81 mg, Co Q-10, fish oil   Warfarin dose taken as prescribed Yes - uses pillbox filled by wife, Fernando Shirts   Signs/symptoms of bleeding -hematuria on 7/2; now resolved  -No h/o major bleeding reported   Vitamin K intake -Normally has ~0 servings of green, leafy vegetables per week d/t missing teeth  -Drinks Ensure and V8 both daily   Recent vomiting/diarrhea/fever, changes in weight or activity level None reported   Tobacco or alcohol use Patient denies both   Upcoming surgeries or procedures None reported     Assessment/Plan:  Patient's INR was therapeutic today (3.0), but seems to be trending back upward. INR has been elevated recently due to decline in appetite and antibiotics. Patient has completed courses of doxycyline and Cipro, and is not currently taking any antibiotics. No recurrence of hematuria reported. Fernando Kate (wife) endorses overall declining appetite, and also states that lately patient has trouble getting his pills into his mouth.   However, Fernando Love is

## 2019-07-26 DIAGNOSIS — I35.9 AORTIC VALVE DISEASE: Primary | ICD-10-CM

## 2019-08-05 ENCOUNTER — TELEPHONE (OUTPATIENT)
Dept: INTERNAL MEDICINE CLINIC | Age: 84
End: 2019-08-05

## 2019-08-05 NOTE — TELEPHONE ENCOUNTER
Pt has 2 sores on his buttocks one is bleeding a little. Pt has been using Aquaphor is there anything else that can be prescribed for this? Pts wife states they are not open sores but one did start bleeding.

## 2019-08-06 ENCOUNTER — TELEPHONE (OUTPATIENT)
Dept: INTERNAL MEDICINE CLINIC | Age: 84
End: 2019-08-06

## 2019-08-12 ENCOUNTER — OFFICE VISIT (OUTPATIENT)
Dept: INTERNAL MEDICINE CLINIC | Age: 84
End: 2019-08-12
Payer: MEDICARE

## 2019-08-12 ENCOUNTER — ANTI-COAG VISIT (OUTPATIENT)
Dept: PHARMACY | Age: 84
End: 2019-08-12
Payer: MEDICARE

## 2019-08-12 VITALS
BODY MASS INDEX: 25.55 KG/M2 | HEART RATE: 80 BPM | DIASTOLIC BLOOD PRESSURE: 70 MMHG | SYSTOLIC BLOOD PRESSURE: 100 MMHG | WEIGHT: 159 LBS | HEIGHT: 66 IN | OXYGEN SATURATION: 99 % | RESPIRATION RATE: 16 BRPM

## 2019-08-12 DIAGNOSIS — I35.9 AORTIC VALVE DISORDER: ICD-10-CM

## 2019-08-12 DIAGNOSIS — L89.151 PRESSURE INJURY OF SACRAL REGION, STAGE 1: Primary | ICD-10-CM

## 2019-08-12 DIAGNOSIS — Z95.2 S/P AVR: ICD-10-CM

## 2019-08-12 LAB — INTERNATIONAL NORMALIZATION RATIO, POC: 2.2

## 2019-08-12 PROCEDURE — 99211 OFF/OP EST MAY X REQ PHY/QHP: CPT

## 2019-08-12 PROCEDURE — 99213 OFFICE O/P EST LOW 20 MIN: CPT | Performed by: INTERNAL MEDICINE

## 2019-08-12 PROCEDURE — 85610 PROTHROMBIN TIME: CPT

## 2019-08-12 ASSESSMENT — ENCOUNTER SYMPTOMS
ABDOMINAL PAIN: 0
NAUSEA: 0
EYE REDNESS: 0
CHEST TIGHTNESS: 0
BACK PAIN: 0
SHORTNESS OF BREATH: 0

## 2019-08-12 ASSESSMENT — PATIENT HEALTH QUESTIONNAIRE - PHQ9
1. LITTLE INTEREST OR PLEASURE IN DOING THINGS: 0
SUM OF ALL RESPONSES TO PHQ QUESTIONS 1-9: 0
SUM OF ALL RESPONSES TO PHQ QUESTIONS 1-9: 0
SUM OF ALL RESPONSES TO PHQ9 QUESTIONS 1 & 2: 0
2. FEELING DOWN, DEPRESSED OR HOPELESS: 0

## 2019-08-20 DIAGNOSIS — I50.30 (HFPEF) HEART FAILURE WITH PRESERVED EJECTION FRACTION (HCC): ICD-10-CM

## 2019-08-20 DIAGNOSIS — F03.90 DEMENTIA WITHOUT BEHAVIORAL DISTURBANCE, UNSPECIFIED DEMENTIA TYPE: ICD-10-CM

## 2019-08-20 RX ORDER — FUROSEMIDE 20 MG/1
TABLET ORAL
Qty: 30 TABLET | Refills: 4 | Status: SHIPPED | OUTPATIENT
Start: 2019-08-20

## 2019-08-20 RX ORDER — MEMANTINE HYDROCHLORIDE 5 MG/1
TABLET ORAL
Qty: 30 TABLET | Refills: 2 | Status: SHIPPED | OUTPATIENT
Start: 2019-08-20 | End: 2019-12-18

## 2019-09-10 ENCOUNTER — TELEPHONE (OUTPATIENT)
Dept: PHARMACY | Age: 84
End: 2019-09-10

## 2019-09-16 ENCOUNTER — ANTI-COAG VISIT (OUTPATIENT)
Dept: PHARMACY | Age: 84
End: 2019-09-16
Payer: MEDICARE

## 2019-09-16 DIAGNOSIS — I35.9 AORTIC VALVE DISORDER: ICD-10-CM

## 2019-09-16 DIAGNOSIS — Z95.2 S/P AVR: ICD-10-CM

## 2019-09-16 LAB — INTERNATIONAL NORMALIZATION RATIO, POC: 2.6

## 2019-09-16 PROCEDURE — 85610 PROTHROMBIN TIME: CPT

## 2019-09-16 PROCEDURE — 99211 OFF/OP EST MAY X REQ PHY/QHP: CPT

## 2019-09-19 DIAGNOSIS — N40.0 BENIGN PROSTATIC HYPERPLASIA, UNSPECIFIED WHETHER LOWER URINARY TRACT SYMPTOMS PRESENT: ICD-10-CM

## 2019-09-19 RX ORDER — TAMSULOSIN HYDROCHLORIDE 0.4 MG/1
CAPSULE ORAL
Qty: 90 CAPSULE | Refills: 4 | Status: SHIPPED | OUTPATIENT
Start: 2019-09-19 | End: 2020-01-03

## 2019-10-14 ENCOUNTER — ANTI-COAG VISIT (OUTPATIENT)
Dept: PHARMACY | Age: 84
End: 2019-10-14
Payer: MEDICARE

## 2019-10-14 DIAGNOSIS — Z95.2 S/P AVR: ICD-10-CM

## 2019-10-14 DIAGNOSIS — I35.9 AORTIC VALVE DISORDER: ICD-10-CM

## 2019-10-14 LAB — INTERNATIONAL NORMALIZATION RATIO, POC: 3.3

## 2019-10-14 PROCEDURE — 99211 OFF/OP EST MAY X REQ PHY/QHP: CPT

## 2019-10-14 PROCEDURE — 85610 PROTHROMBIN TIME: CPT

## 2019-10-20 DIAGNOSIS — I35.9 AORTIC VALVE DISORDER: ICD-10-CM

## 2019-10-21 RX ORDER — WARFARIN SODIUM 5 MG/1
TABLET ORAL
Qty: 78 TABLET | Refills: 2 | Status: SHIPPED | OUTPATIENT
Start: 2019-10-21 | End: 2020-01-21 | Stop reason: SDUPTHER

## 2019-10-31 DIAGNOSIS — N39.41 URGE INCONTINENCE OF URINE: ICD-10-CM

## 2019-10-31 RX ORDER — MIRABEGRON 25 MG/1
TABLET, FILM COATED, EXTENDED RELEASE ORAL
Qty: 60 TABLET | Refills: 0 | Status: SHIPPED | OUTPATIENT
Start: 2019-10-31 | End: 2019-12-27

## 2019-11-04 DIAGNOSIS — K08.9 POOR DENTITION: ICD-10-CM

## 2019-11-04 RX ORDER — RISPERIDONE 0.5 MG/1
TABLET, FILM COATED ORAL
Qty: 90 TABLET | Refills: 0 | Status: SHIPPED | OUTPATIENT
Start: 2019-11-04 | End: 2020-01-21 | Stop reason: SDUPTHER

## 2019-11-06 ENCOUNTER — OFFICE VISIT (OUTPATIENT)
Dept: CARDIOLOGY CLINIC | Age: 84
End: 2019-11-06
Payer: MEDICARE

## 2019-11-06 ENCOUNTER — ANTI-COAG VISIT (OUTPATIENT)
Dept: PHARMACY | Age: 84
End: 2019-11-06
Payer: MEDICARE

## 2019-11-06 VITALS — HEART RATE: 60 BPM | SYSTOLIC BLOOD PRESSURE: 110 MMHG | DIASTOLIC BLOOD PRESSURE: 70 MMHG

## 2019-11-06 DIAGNOSIS — Z95.2 S/P AVR (AORTIC VALVE REPLACEMENT): Primary | ICD-10-CM

## 2019-11-06 DIAGNOSIS — I10 ESSENTIAL HYPERTENSION: ICD-10-CM

## 2019-11-06 DIAGNOSIS — I35.0 NONRHEUMATIC AORTIC VALVE STENOSIS: ICD-10-CM

## 2019-11-06 DIAGNOSIS — Z95.2 S/P AVR: ICD-10-CM

## 2019-11-06 DIAGNOSIS — I35.9 AORTIC VALVE DISORDER: ICD-10-CM

## 2019-11-06 DIAGNOSIS — I48.20 CHRONIC A-FIB (HCC): ICD-10-CM

## 2019-11-06 LAB — INTERNATIONAL NORMALIZATION RATIO, POC: 2.1

## 2019-11-06 PROCEDURE — 99214 OFFICE O/P EST MOD 30 MIN: CPT | Performed by: INTERNAL MEDICINE

## 2019-11-06 PROCEDURE — 85610 PROTHROMBIN TIME: CPT

## 2019-11-06 PROCEDURE — 99211 OFF/OP EST MAY X REQ PHY/QHP: CPT

## 2019-12-10 ENCOUNTER — OFFICE VISIT (OUTPATIENT)
Dept: INTERNAL MEDICINE CLINIC | Age: 84
End: 2019-12-10
Payer: MEDICARE

## 2019-12-10 VITALS — HEART RATE: 75 BPM | OXYGEN SATURATION: 98 % | SYSTOLIC BLOOD PRESSURE: 102 MMHG | DIASTOLIC BLOOD PRESSURE: 62 MMHG

## 2019-12-10 DIAGNOSIS — E78.00 PURE HYPERCHOLESTEROLEMIA: ICD-10-CM

## 2019-12-10 DIAGNOSIS — F02.80 LATE ONSET ALZHEIMER'S DISEASE WITHOUT BEHAVIORAL DISTURBANCE (HCC): ICD-10-CM

## 2019-12-10 DIAGNOSIS — Z95.2 S/P AVR: Primary | ICD-10-CM

## 2019-12-10 DIAGNOSIS — G30.1 LATE ONSET ALZHEIMER'S DISEASE WITHOUT BEHAVIORAL DISTURBANCE (HCC): ICD-10-CM

## 2019-12-10 PROCEDURE — 99213 OFFICE O/P EST LOW 20 MIN: CPT | Performed by: INTERNAL MEDICINE

## 2019-12-10 SDOH — ECONOMIC STABILITY: FOOD INSECURITY: WITHIN THE PAST 12 MONTHS, YOU WORRIED THAT YOUR FOOD WOULD RUN OUT BEFORE YOU GOT MONEY TO BUY MORE.: NEVER TRUE

## 2019-12-10 SDOH — ECONOMIC STABILITY: INCOME INSECURITY: HOW HARD IS IT FOR YOU TO PAY FOR THE VERY BASICS LIKE FOOD, HOUSING, MEDICAL CARE, AND HEATING?: NOT HARD AT ALL

## 2019-12-10 SDOH — ECONOMIC STABILITY: FOOD INSECURITY: WITHIN THE PAST 12 MONTHS, THE FOOD YOU BOUGHT JUST DIDN'T LAST AND YOU DIDN'T HAVE MONEY TO GET MORE.: NEVER TRUE

## 2019-12-10 SDOH — ECONOMIC STABILITY: TRANSPORTATION INSECURITY
IN THE PAST 12 MONTHS, HAS LACK OF TRANSPORTATION KEPT YOU FROM MEETINGS, WORK, OR FROM GETTING THINGS NEEDED FOR DAILY LIVING?: NO

## 2019-12-10 SDOH — ECONOMIC STABILITY: TRANSPORTATION INSECURITY
IN THE PAST 12 MONTHS, HAS THE LACK OF TRANSPORTATION KEPT YOU FROM MEDICAL APPOINTMENTS OR FROM GETTING MEDICATIONS?: NO

## 2019-12-10 ASSESSMENT — ENCOUNTER SYMPTOMS
CHEST TIGHTNESS: 0
VOMITING: 0
ABDOMINAL PAIN: 0
DIARRHEA: 0
BLOOD IN STOOL: 0
CONSTIPATION: 0
SHORTNESS OF BREATH: 0
NAUSEA: 0

## 2019-12-13 ENCOUNTER — ANTI-COAG VISIT (OUTPATIENT)
Dept: PHARMACY | Age: 84
End: 2019-12-13
Payer: MEDICARE

## 2019-12-13 DIAGNOSIS — I35.9 AORTIC VALVE DISORDER: ICD-10-CM

## 2019-12-13 DIAGNOSIS — Z95.2 S/P AVR: ICD-10-CM

## 2019-12-13 LAB — INTERNATIONAL NORMALIZATION RATIO, POC: 1.9

## 2019-12-13 PROCEDURE — 99211 OFF/OP EST MAY X REQ PHY/QHP: CPT

## 2019-12-13 PROCEDURE — 85610 PROTHROMBIN TIME: CPT

## 2019-12-17 RX ORDER — DIGOXIN 125 MCG
TABLET ORAL
Qty: 90 TABLET | Refills: 2 | Status: SHIPPED | OUTPATIENT
Start: 2019-12-17 | End: 2019-12-18

## 2019-12-18 DIAGNOSIS — F03.90 DEMENTIA WITHOUT BEHAVIORAL DISTURBANCE, UNSPECIFIED DEMENTIA TYPE: ICD-10-CM

## 2019-12-18 DIAGNOSIS — E78.00 PURE HYPERCHOLESTEROLEMIA: ICD-10-CM

## 2019-12-18 RX ORDER — ATORVASTATIN CALCIUM 40 MG/1
TABLET, FILM COATED ORAL
Qty: 90 TABLET | Refills: 0 | Status: SHIPPED | OUTPATIENT
Start: 2019-12-18 | End: 2020-02-17

## 2019-12-18 RX ORDER — MEMANTINE HYDROCHLORIDE 5 MG/1
TABLET ORAL
Qty: 90 TABLET | Refills: 1 | Status: SHIPPED | OUTPATIENT
Start: 2019-12-18

## 2019-12-18 RX ORDER — DIGOXIN 125 MCG
TABLET ORAL
Qty: 90 TABLET | Refills: 0 | Status: SHIPPED | OUTPATIENT
Start: 2019-12-18

## 2019-12-27 DIAGNOSIS — N39.41 URGE INCONTINENCE OF URINE: ICD-10-CM

## 2019-12-27 RX ORDER — MIRABEGRON 25 MG/1
TABLET, FILM COATED, EXTENDED RELEASE ORAL
Qty: 60 TABLET | Refills: 0 | Status: SHIPPED | OUTPATIENT
Start: 2019-12-27 | End: 2020-02-17

## 2020-01-03 RX ORDER — TAMSULOSIN HYDROCHLORIDE 0.4 MG/1
CAPSULE ORAL
Qty: 90 CAPSULE | Refills: 3 | Status: SHIPPED | OUTPATIENT
Start: 2020-01-03

## 2020-01-10 ENCOUNTER — ANTI-COAG VISIT (OUTPATIENT)
Dept: PHARMACY | Age: 85
End: 2020-01-10
Payer: MEDICARE

## 2020-01-10 LAB — INTERNATIONAL NORMALIZATION RATIO, POC: 2.1

## 2020-01-10 PROCEDURE — 85610 PROTHROMBIN TIME: CPT

## 2020-01-10 PROCEDURE — 99211 OFF/OP EST MAY X REQ PHY/QHP: CPT

## 2020-01-14 ENCOUNTER — APPOINTMENT (OUTPATIENT)
Dept: CT IMAGING | Age: 85
End: 2020-01-14
Payer: MEDICARE

## 2020-01-14 ENCOUNTER — APPOINTMENT (OUTPATIENT)
Dept: GENERAL RADIOLOGY | Age: 85
End: 2020-01-14
Payer: MEDICARE

## 2020-01-14 ENCOUNTER — HOSPITAL ENCOUNTER (EMERGENCY)
Age: 85
Discharge: HOME | End: 2020-01-14
Attending: EMERGENCY MEDICINE
Payer: MEDICARE

## 2020-01-14 ENCOUNTER — TELEPHONE (OUTPATIENT)
Dept: INTERNAL MEDICINE CLINIC | Age: 85
End: 2020-01-14

## 2020-01-14 VITALS
SYSTOLIC BLOOD PRESSURE: 125 MMHG | DIASTOLIC BLOOD PRESSURE: 76 MMHG | HEART RATE: 82 BPM | RESPIRATION RATE: 18 BRPM | OXYGEN SATURATION: 97 % | TEMPERATURE: 98.6 F

## 2020-01-14 LAB
A/G RATIO: 1.2 (ref 1.1–2.2)
ALBUMIN SERPL-MCNC: 3.8 G/DL (ref 3.4–5)
ALP BLD-CCNC: 148 U/L (ref 40–129)
ALT SERPL-CCNC: 28 U/L (ref 10–40)
AMORPHOUS: ABNORMAL /HPF
ANION GAP SERPL CALCULATED.3IONS-SCNC: 14 MMOL/L (ref 3–16)
APTT: 35.6 SEC (ref 24.2–36.2)
AST SERPL-CCNC: 37 U/L (ref 15–37)
BACTERIA: ABNORMAL /HPF
BASOPHILS ABSOLUTE: 0 K/UL (ref 0–0.2)
BASOPHILS RELATIVE PERCENT: 0.2 %
BILIRUB SERPL-MCNC: 1 MG/DL (ref 0–1)
BILIRUBIN URINE: NEGATIVE
BLOOD, URINE: ABNORMAL
BUN BLDV-MCNC: 10 MG/DL (ref 7–20)
CALCIUM SERPL-MCNC: 9.4 MG/DL (ref 8.3–10.6)
CHLORIDE BLD-SCNC: 102 MMOL/L (ref 99–110)
CLARITY: CLEAR
CO2: 24 MMOL/L (ref 21–32)
COLOR: YELLOW
CREAT SERPL-MCNC: 0.8 MG/DL (ref 0.8–1.3)
EKG ATRIAL RATE: 72 BPM
EKG DIAGNOSIS: NORMAL
EKG Q-T INTERVAL: 404 MS
EKG QRS DURATION: 118 MS
EKG QTC CALCULATION (BAZETT): 442 MS
EKG R AXIS: -61 DEGREES
EKG T AXIS: -11 DEGREES
EKG VENTRICULAR RATE: 72 BPM
EOSINOPHILS ABSOLUTE: 0.3 K/UL (ref 0–0.6)
EOSINOPHILS RELATIVE PERCENT: 3.4 %
EPITHELIAL CELLS, UA: ABNORMAL /HPF
GFR AFRICAN AMERICAN: >60
GFR NON-AFRICAN AMERICAN: >60
GLOBULIN: 3.1 G/DL
GLUCOSE BLD-MCNC: 162 MG/DL (ref 70–99)
GLUCOSE URINE: NEGATIVE MG/DL
HCT VFR BLD CALC: 40.9 % (ref 40.5–52.5)
HEMOGLOBIN: 13.8 G/DL (ref 13.5–17.5)
INR BLD: 2.32 (ref 0.86–1.14)
KETONES, URINE: NEGATIVE MG/DL
LEUKOCYTE ESTERASE, URINE: NEGATIVE
LYMPHOCYTES ABSOLUTE: 0.6 K/UL (ref 1–5.1)
LYMPHOCYTES RELATIVE PERCENT: 6.1 %
MCH RBC QN AUTO: 33.5 PG (ref 26–34)
MCHC RBC AUTO-ENTMCNC: 33.7 G/DL (ref 31–36)
MCV RBC AUTO: 99.4 FL (ref 80–100)
MICROSCOPIC EXAMINATION: YES
MONOCYTES ABSOLUTE: 0.9 K/UL (ref 0–1.3)
MONOCYTES RELATIVE PERCENT: 9.1 %
MUCUS: ABNORMAL /LPF
NEUTROPHILS ABSOLUTE: 7.6 K/UL (ref 1.7–7.7)
NEUTROPHILS RELATIVE PERCENT: 81.2 %
NITRITE, URINE: NEGATIVE
PDW BLD-RTO: 13.5 % (ref 12.4–15.4)
PH UA: 8 (ref 5–8)
PLATELET # BLD: 193 K/UL (ref 135–450)
PMV BLD AUTO: 9.6 FL (ref 5–10.5)
POTASSIUM REFLEX MAGNESIUM: 4.2 MMOL/L (ref 3.5–5.1)
PROTEIN UA: NEGATIVE MG/DL
PROTHROMBIN TIME: 27.1 SEC (ref 10–13.2)
RBC # BLD: 4.12 M/UL (ref 4.2–5.9)
RBC UA: ABNORMAL /HPF (ref 0–2)
SODIUM BLD-SCNC: 140 MMOL/L (ref 136–145)
SPECIFIC GRAVITY UA: 1.02 (ref 1–1.03)
TOTAL PROTEIN: 6.9 G/DL (ref 6.4–8.2)
TROPONIN: <0.01 NG/ML
URINE TYPE: ABNORMAL
UROBILINOGEN, URINE: 0.2 E.U./DL
WBC # BLD: 9.4 K/UL (ref 4–11)
WBC UA: ABNORMAL /HPF (ref 0–5)

## 2020-01-14 PROCEDURE — 93005 ELECTROCARDIOGRAM TRACING: CPT | Performed by: EMERGENCY MEDICINE

## 2020-01-14 PROCEDURE — 85025 COMPLETE CBC W/AUTO DIFF WBC: CPT

## 2020-01-14 PROCEDURE — 99285 EMERGENCY DEPT VISIT HI MDM: CPT

## 2020-01-14 PROCEDURE — 85610 PROTHROMBIN TIME: CPT

## 2020-01-14 PROCEDURE — 81001 URINALYSIS AUTO W/SCOPE: CPT

## 2020-01-14 PROCEDURE — 70450 CT HEAD/BRAIN W/O DYE: CPT

## 2020-01-14 PROCEDURE — 80053 COMPREHEN METABOLIC PANEL: CPT

## 2020-01-14 PROCEDURE — 84484 ASSAY OF TROPONIN QUANT: CPT

## 2020-01-14 PROCEDURE — 85730 THROMBOPLASTIN TIME PARTIAL: CPT

## 2020-01-14 PROCEDURE — 71045 X-RAY EXAM CHEST 1 VIEW: CPT

## 2020-01-14 NOTE — CARE COORDINATION
Referred to patient/family for d/c planning. Spoke to wife and sister at bedside. Wife feels they have no needs at home. They have a private duty home health aide 2-3 hours a day to assist patient with his ADLs. Patient uses a walker for mobility. Discussed possible home care or other needs, wife declines at this time. MD updated.     Electronically signed by CHARLI Sams LISW-S on 1/14/2020 at 4:41 PM

## 2020-01-14 NOTE — ED PROVIDER NOTES
[address]          ATTENDING PHYSICIAN NOTE       Date of evaluation: 1/14/2020    Chief Complaint     Fatigue (wife states- caregiver came to the house this AM to give patient a bath, when he was so weak he was unable to get out of bed. states he has dementia but seemed to be more confused this morning- states he was having slurred speech this morning which has since resolved. patient takes coumadin daily and was given asa per EMS. patient denies pain )      History of Present Illness     Gomez Box is a 80 y.o. male who presents presents with an episode of confusion and slightly slurred speech which occurred earlier today. Monica Saucedo His wife states that this was short-lived. He does have a history of dementia. She states his symptoms all resolved and is back to his baseline. Patient unable to give an adequate history but his denied headache chest pain or shortness of breath. Review of Systems     Review of Systems   Unable to perform ROS: Dementia       Past Medical, Surgical, Family, and Social History     He has a past medical history of Aortic stenosis, Arthritis, Dementia ([address] Hyperlipidemia, Hypertension, Kidney stones, Pneumonia, and SOB (shortness of breath). He has a past surgical history that includes Hemorrhoid surgery ([id number]); Ankle fracture surgery; Forearm fracture surgery; Kidney stone surgery; Tonsillectomy ([id number]); Testicle surgery; Aortic valve replacement (6/20/2012); and Colonoscopy. His family history includes Arthritis in his mother; Heart Disease in his father; Kidney Disease in his brother. He reports that he has never smoked. He has never used smokeless tobacco. He reports current alcohol use of about 11.0 standard drinks of alcohol per week. He reports that he does not use drugs.     Medications     Discharge Medication List as of 1/14/2020  6:34 PM      CONTINUE these medications which have NOT CHANGED    Details   tamsulosin (FLOMAX) 0.4 MG capsule TAKE ONE CAPSULE BY MOUTH DAILY, Disp-90 capsule, R-3Normal      MYRBETRIQ 25 MG TB24 TAKE ONE TABLET BY MOUTH DAILY, Disp-60 tablet, R-0Normal      atorvastatin (LIPITOR) 40 MG tablet TAKE ONE TABLET BY MOUTH DAILY, Disp-90 tablet, R-0Normal      memantine (NAMENDA) 5 MG tablet TAKE ONE TABLET BY MOUTH DAILY, Disp-90 tablet, R-1Normal      digoxin (DIGOX) 125 MCG tablet TAKE ONE TABLET BY MOUTH DAILY, Disp-90 tablet, R-0Normal      risperiDONE (RISPERDAL) 0.5 MG tablet TAKE ONE TABLET BY MOUTH DAILY, Disp-90 tablet, R-0Normal      warfarin (COUMADIN) 5 MG tablet TAKE ONE-HALF TABLET ON MONDAYS AND FRIDAYS AND TAKE ONE TABLET ALL OTHER DAYS, Disp-78 tablet, R-2Normal      furosemide (LASIX) 20 MG tablet TAKE ONE TABLET BY MOUTH ON MONDAY, WEDNESDAY AND FRIDAY, Disp-30 tablet, R-4Normal      mupirocin (BACTROBAN) 2 % ointment Apply topically daily For 10 days for wounds on scalp, Topical, DAILY Starting Mon 6/10/2019, Historical Med      doxycycline hyclate (VIBRAMYCIN) 100 MG capsule Take 1 capsule by mouth 2 times daily For 10 daysHistorical Med      clotrimazole-betamethasone (LOTRISONE) 1-0.05 % cream Apply topically 2 times daily. , Disp-45 g, R-2, Normal      alendronate (FOSAMAX) 70 MG tablet TAKE 1 TABLET BY MOUTH ONCE WEEKLY BEFORE BREAKFAST, ON AN EMPTY STOMACH: REMAIN UPRIGHT FOR 30 MINUTES, Disp-12 tablet, R-0Normal      Omega-3 Fatty Acids (FISH OIL) 1000 MG CPDR Take 1 capsule by mouth daily. acetaminophen (TYLENOL) 325 MG tablet Take 2 tablets by mouth every 6 hours as needed for Pain or Fever (For mild pain level 1-3 or for fever > 100.5). aspirin 81 MG EC tablet Take 81 mg by mouth daily. Cholecalciferol (VITAMIN D) 1000 UNIT TABS Take 2 tablets by mouth daily. Coenzyme Q10 (CO Q-10 PO) Take 1 tablet by mouth daily. Multiple Vitamin (MULTIVITAMIN PO) Take 1 tablet by mouth daily. Allergies     He has No Known Allergies.     Physical Exam (H) 0.86 - 1.14   PTT   Result Value Ref Range    aPTT 35.6 24.2 - 36.2 sec   Troponin   Result Value Ref Range    Troponin <0.01 <0.01 ng/mL   Microscopic Urinalysis   Result Value Ref Range    Mucus, UA 1+ (A) /LPF    WBC, UA 3-5 0 - 5 /HPF    RBC, UA 10-20 (A) 0 - 2 /HPF    Epi Cells 0-2 /HPF    Bacteria, UA Rare (A) /HPF    Amorphous, UA 1+ (A) /HPF   EKG 12 Lead   Result Value Ref Range    Ventricular Rate 72 BPM    Atrial Rate 72 BPM    QRS Duration 118 ms    Q-T Interval 404 ms    QTc Calculation (Bazett) 442 ms    R Axis -61 degrees    T Axis -11 degrees    Diagnosis       EKG performed in ER and to be interpreted by ER physician. Confirmed by MD, ER ([id number]),  Carlito Bray ([phone]) on 1/14/2020 4:20:51 PM           RECENT VITALS:  BP: 125/76,Temp: 98.6 °F (37 °C), Pulse: 82, Resp: 18, SpO2: 97 %     Procedures         ED Course     Nursing Notes, Past Medical Hx, Past Surgical Hx, Social Hx,Allergies, and Family Hx were reviewed. patient was given the following medications:  No orders of the defined types were placed in this encounter. CONSULTS:  None    MEDICAL DECISIONMAKING / ASSESSMENT / PLAN     Erica Nguyen is a 80 y.o. male presents with an episode of confusion and slurred speech. According to his wife he had no other focal deficits. CT head here was unremarkable and the rest of his labs were also unremarkable. .  Patient was discharged home to follow-up with his family physician. Clinical Impression     1. Generalized weakness        Disposition     PATIENT REFERRED TO:  Delphine Geiger MD  [address]  [address] [facility]  [facility]    Schedule an appointment as soon as possible for a visit in 2 days        DISCHARGE MEDICATIONS:  Discharge Medication List as of 1/14/2020  6:34 PM          DISPOSITION Decision To Discharge 01/14/2020 09:25:54 PM       Luis Piña MD  01/14/20 6987

## 2020-01-14 NOTE — ED NOTES
Bed: B17-17  Expected date:   Expected time:   Means of arrival:   Comments:  Kevin Alonso, RN  01/14/20 1056

## 2020-01-15 ENCOUNTER — TELEPHONE (OUTPATIENT)
Dept: INTERNAL MEDICINE CLINIC | Age: 85
End: 2020-01-15

## 2020-01-15 NOTE — TELEPHONE ENCOUNTER
Pt wife states pt was at emergency room ( [facility] ) last night for possible stroke. Pt wife requesting to speak to Dr. Schwab Dies today  about care.

## 2020-01-21 RX ORDER — RISPERIDONE 0.5 MG/1
TABLET, FILM COATED ORAL
Qty: 90 TABLET | Refills: 0 | Status: ON HOLD | OUTPATIENT
Start: 2020-01-21 | End: 2020-02-01 | Stop reason: SDUPTHER

## 2020-01-21 RX ORDER — WARFARIN SODIUM 5 MG/1
TABLET ORAL
Qty: 80 TABLET | Refills: 1 | Status: ON HOLD | OUTPATIENT
Start: 2020-01-21 | End: 2020-01-29

## 2020-01-28 ENCOUNTER — APPOINTMENT (OUTPATIENT)
Dept: CT IMAGING | Age: 85
DRG: 637 | End: 2020-01-28
Payer: MEDICARE

## 2020-01-28 ENCOUNTER — APPOINTMENT (OUTPATIENT)
Dept: GENERAL RADIOLOGY | Age: 85
DRG: 637 | End: 2020-01-28
Payer: MEDICARE

## 2020-01-28 ENCOUNTER — HOSPITAL ENCOUNTER (INPATIENT)
Age: 85
LOS: 4 days | Discharge: NURSING, REHAB OR LONG-TERM CARE | DRG: 637 | End: 2020-02-01
Attending: EMERGENCY MEDICINE | Admitting: INTERNAL MEDICINE
Payer: MEDICARE

## 2020-01-28 PROBLEM — G93.41 ACUTE METABOLIC ENCEPHALOPATHY: Status: ACTIVE | Noted: 2020-01-28

## 2020-01-28 LAB
ALBUMIN SERPL-MCNC: 3.5 G/DL (ref 3.4–5)
ALP BLD-CCNC: 140 U/L (ref 40–129)
ALT SERPL-CCNC: 33 U/L (ref 10–40)
AMORPHOUS: ABNORMAL /HPF
ANION GAP SERPL CALCULATED.3IONS-SCNC: 10 MMOL/L (ref 3–16)
AST SERPL-CCNC: 38 U/L (ref 15–37)
BACTERIA: ABNORMAL /HPF
BASE EXCESS VENOUS: 1.9 MMOL/L (ref -2–3)
BASOPHILS ABSOLUTE: 0 K/UL (ref 0–0.2)
BASOPHILS RELATIVE PERCENT: 0.3 %
BILIRUB SERPL-MCNC: 0.4 MG/DL (ref 0–1)
BILIRUBIN DIRECT: <0.2 MG/DL (ref 0–0.3)
BILIRUBIN URINE: NEGATIVE
BILIRUBIN, INDIRECT: ABNORMAL MG/DL (ref 0–1)
BLOOD, URINE: ABNORMAL
BUN BLDV-MCNC: 13 MG/DL (ref 7–20)
CALCIUM SERPL-MCNC: 9.5 MG/DL (ref 8.3–10.6)
CARBOXYHEMOGLOBIN: 1.8 % (ref 0–1.5)
CHLORIDE BLD-SCNC: 103 MMOL/L (ref 99–110)
CLARITY: CLEAR
CO2: 28 MMOL/L (ref 21–32)
COLOR: YELLOW
CREAT SERPL-MCNC: 0.9 MG/DL (ref 0.8–1.3)
DIGOXIN LEVEL: 0.7 NG/ML (ref 0.8–2)
EKG ATRIAL RATE: 54 BPM
EKG DIAGNOSIS: NORMAL
EKG Q-T INTERVAL: 428 MS
EKG QRS DURATION: 124 MS
EKG QTC CALCULATION (BAZETT): 434 MS
EKG R AXIS: -35 DEGREES
EKG T AXIS: -13 DEGREES
EKG VENTRICULAR RATE: 62 BPM
EOSINOPHILS ABSOLUTE: 0.5 K/UL (ref 0–0.6)
EOSINOPHILS RELATIVE PERCENT: 5 %
EPITHELIAL CELLS, UA: ABNORMAL /HPF
GFR AFRICAN AMERICAN: >60
GFR NON-AFRICAN AMERICAN: >60
GLUCOSE BLD-MCNC: 174 MG/DL (ref 70–99)
GLUCOSE BLD-MCNC: 330 MG/DL (ref 70–99)
GLUCOSE URINE: 500 MG/DL
HCO3 VENOUS: 27.1 MMOL/L (ref 24–28)
HCT VFR BLD CALC: 40.8 % (ref 40.5–52.5)
HEMOGLOBIN, VEN, REDUCED: 27.5 %
HEMOGLOBIN: 13.6 G/DL (ref 13.5–17.5)
INR BLD: 3.58 (ref 0.86–1.14)
KETONES, URINE: NEGATIVE MG/DL
LACTIC ACID: 2 MMOL/L (ref 0.4–2)
LEUKOCYTE ESTERASE, URINE: NEGATIVE
LIPASE: 16 U/L (ref 13–60)
LYMPHOCYTES ABSOLUTE: 0.9 K/UL (ref 1–5.1)
LYMPHOCYTES RELATIVE PERCENT: 9.9 %
MCH RBC QN AUTO: 33.3 PG (ref 26–34)
MCHC RBC AUTO-ENTMCNC: 33.2 G/DL (ref 31–36)
MCV RBC AUTO: 100.3 FL (ref 80–100)
METHEMOGLOBIN VENOUS: 0.2 % (ref 0–1.5)
MICROSCOPIC EXAMINATION: YES
MONOCYTES ABSOLUTE: 0.9 K/UL (ref 0–1.3)
MONOCYTES RELATIVE PERCENT: 10 %
NEUTROPHILS ABSOLUTE: 7 K/UL (ref 1.7–7.7)
NEUTROPHILS RELATIVE PERCENT: 74.8 %
NITRITE, URINE: NEGATIVE
O2 SAT, VEN: 72 %
PCO2, VEN: 47.4 MMHG (ref 41–51)
PDW BLD-RTO: 13.2 % (ref 12.4–15.4)
PERFORMED ON: ABNORMAL
PH UA: 6 (ref 5–8)
PH VENOUS: 7.38 (ref 7.35–7.45)
PLATELET # BLD: 279 K/UL (ref 135–450)
PMV BLD AUTO: 9.1 FL (ref 5–10.5)
PO2, VEN: 38.5 MMHG (ref 25–40)
POTASSIUM REFLEX MAGNESIUM: 5 MMOL/L (ref 3.5–5.1)
PRO-BNP: 559 PG/ML (ref 0–449)
PROTEIN UA: ABNORMAL MG/DL
PROTHROMBIN TIME: 42.1 SEC (ref 10–13.2)
RBC # BLD: 4.07 M/UL (ref 4.2–5.9)
RBC UA: ABNORMAL /HPF (ref 0–2)
SODIUM BLD-SCNC: 141 MMOL/L (ref 136–145)
SPECIFIC GRAVITY UA: 1.02 (ref 1–1.03)
TCO2 CALC VENOUS: 29 MMOL/L
TOTAL PROTEIN: 6.6 G/DL (ref 6.4–8.2)
TROPONIN: <0.01 NG/ML
URINE TYPE: ABNORMAL
UROBILINOGEN, URINE: 0.2 E.U./DL
WBC # BLD: 9.4 K/UL (ref 4–11)
WBC UA: ABNORMAL /HPF (ref 0–5)

## 2020-01-28 PROCEDURE — 85610 PROTHROMBIN TIME: CPT

## 2020-01-28 PROCEDURE — 2580000003 HC RX 258: Performed by: STUDENT IN AN ORGANIZED HEALTH CARE EDUCATION/TRAINING PROGRAM

## 2020-01-28 PROCEDURE — 99223 1ST HOSP IP/OBS HIGH 75: CPT | Performed by: INTERNAL MEDICINE

## 2020-01-28 PROCEDURE — 80076 HEPATIC FUNCTION PANEL: CPT

## 2020-01-28 PROCEDURE — 81001 URINALYSIS AUTO W/SCOPE: CPT

## 2020-01-28 PROCEDURE — 82803 BLOOD GASES ANY COMBINATION: CPT

## 2020-01-28 PROCEDURE — 84484 ASSAY OF TROPONIN QUANT: CPT

## 2020-01-28 PROCEDURE — 93005 ELECTROCARDIOGRAM TRACING: CPT | Performed by: EMERGENCY MEDICINE

## 2020-01-28 PROCEDURE — 72131 CT LUMBAR SPINE W/O DYE: CPT

## 2020-01-28 PROCEDURE — 83605 ASSAY OF LACTIC ACID: CPT

## 2020-01-28 PROCEDURE — 1200000000 HC SEMI PRIVATE

## 2020-01-28 PROCEDURE — 80048 BASIC METABOLIC PNL TOTAL CA: CPT

## 2020-01-28 PROCEDURE — 99285 EMERGENCY DEPT VISIT HI MDM: CPT

## 2020-01-28 PROCEDURE — 85025 COMPLETE CBC W/AUTO DIFF WBC: CPT

## 2020-01-28 PROCEDURE — 70450 CT HEAD/BRAIN W/O DYE: CPT

## 2020-01-28 PROCEDURE — 83880 ASSAY OF NATRIURETIC PEPTIDE: CPT

## 2020-01-28 PROCEDURE — 71045 X-RAY EXAM CHEST 1 VIEW: CPT

## 2020-01-28 PROCEDURE — 83690 ASSAY OF LIPASE: CPT

## 2020-01-28 PROCEDURE — 80162 ASSAY OF DIGOXIN TOTAL: CPT

## 2020-01-28 PROCEDURE — 84443 ASSAY THYROID STIM HORMONE: CPT

## 2020-01-28 PROCEDURE — 83036 HEMOGLOBIN GLYCOSYLATED A1C: CPT

## 2020-01-28 PROCEDURE — 36415 COLL VENOUS BLD VENIPUNCTURE: CPT

## 2020-01-28 PROCEDURE — 6370000000 HC RX 637 (ALT 250 FOR IP): Performed by: STUDENT IN AN ORGANIZED HEALTH CARE EDUCATION/TRAINING PROGRAM

## 2020-01-28 PROCEDURE — 87086 URINE CULTURE/COLONY COUNT: CPT

## 2020-01-28 RX ORDER — VITAMIN B COMPLEX
2000 TABLET ORAL DAILY
Status: DISCONTINUED | OUTPATIENT
Start: 2020-01-29 | End: 2020-02-01 | Stop reason: HOSPADM

## 2020-01-28 RX ORDER — ACETAMINOPHEN 325 MG/1
650 TABLET ORAL EVERY 4 HOURS PRN
Status: DISCONTINUED | OUTPATIENT
Start: 2020-01-28 | End: 2020-02-01 | Stop reason: HOSPADM

## 2020-01-28 RX ORDER — DEXTROSE MONOHYDRATE 50 MG/ML
100 INJECTION, SOLUTION INTRAVENOUS PRN
Status: DISCONTINUED | OUTPATIENT
Start: 2020-01-28 | End: 2020-02-01 | Stop reason: HOSPADM

## 2020-01-28 RX ORDER — ATORVASTATIN CALCIUM 40 MG/1
40 TABLET, FILM COATED ORAL DAILY
Status: DISCONTINUED | OUTPATIENT
Start: 2020-01-29 | End: 2020-02-01 | Stop reason: HOSPADM

## 2020-01-28 RX ORDER — NICOTINE POLACRILEX 4 MG
15 LOZENGE BUCCAL PRN
Status: DISCONTINUED | OUTPATIENT
Start: 2020-01-28 | End: 2020-02-01 | Stop reason: HOSPADM

## 2020-01-28 RX ORDER — SODIUM CHLORIDE 0.9 % (FLUSH) 0.9 %
10 SYRINGE (ML) INJECTION EVERY 12 HOURS SCHEDULED
Status: DISCONTINUED | OUTPATIENT
Start: 2020-01-28 | End: 2020-02-01 | Stop reason: HOSPADM

## 2020-01-28 RX ORDER — ASPIRIN 81 MG/1
81 TABLET ORAL DAILY
Status: DISCONTINUED | OUTPATIENT
Start: 2020-01-29 | End: 2020-02-01 | Stop reason: HOSPADM

## 2020-01-28 RX ORDER — INSULIN LISPRO 100 [IU]/ML
0-3 INJECTION, SOLUTION INTRAVENOUS; SUBCUTANEOUS NIGHTLY
Status: DISCONTINUED | OUTPATIENT
Start: 2020-01-28 | End: 2020-02-01 | Stop reason: HOSPADM

## 2020-01-28 RX ORDER — MEMANTINE HYDROCHLORIDE 5 MG/1
5 TABLET ORAL DAILY
Status: DISCONTINUED | OUTPATIENT
Start: 2020-01-29 | End: 2020-02-01 | Stop reason: HOSPADM

## 2020-01-28 RX ORDER — DIGOXIN 125 MCG
125 TABLET ORAL DAILY
Status: DISCONTINUED | OUTPATIENT
Start: 2020-01-29 | End: 2020-02-01 | Stop reason: HOSPADM

## 2020-01-28 RX ORDER — SODIUM CHLORIDE 9 MG/ML
INJECTION, SOLUTION INTRAVENOUS CONTINUOUS
Status: ACTIVE | OUTPATIENT
Start: 2020-01-28 | End: 2020-01-29

## 2020-01-28 RX ORDER — 0.9 % SODIUM CHLORIDE 0.9 %
500 INTRAVENOUS SOLUTION INTRAVENOUS ONCE
Status: DISCONTINUED | OUTPATIENT
Start: 2020-01-28 | End: 2020-01-28

## 2020-01-28 RX ORDER — TAMSULOSIN HYDROCHLORIDE 0.4 MG/1
0.4 CAPSULE ORAL DAILY
Status: DISCONTINUED | OUTPATIENT
Start: 2020-01-29 | End: 2020-02-01 | Stop reason: HOSPADM

## 2020-01-28 RX ORDER — SODIUM CHLORIDE 0.9 % (FLUSH) 0.9 %
10 SYRINGE (ML) INJECTION PRN
Status: DISCONTINUED | OUTPATIENT
Start: 2020-01-28 | End: 2020-02-01 | Stop reason: HOSPADM

## 2020-01-28 RX ORDER — DEXTROSE MONOHYDRATE 25 G/50ML
12.5 INJECTION, SOLUTION INTRAVENOUS PRN
Status: DISCONTINUED | OUTPATIENT
Start: 2020-01-28 | End: 2020-02-01 | Stop reason: HOSPADM

## 2020-01-28 RX ORDER — FAMOTIDINE 20 MG/1
20 TABLET, FILM COATED ORAL 2 TIMES DAILY
Status: DISCONTINUED | OUTPATIENT
Start: 2020-01-28 | End: 2020-02-01 | Stop reason: HOSPADM

## 2020-01-28 RX ORDER — INSULIN LISPRO 100 [IU]/ML
0-6 INJECTION, SOLUTION INTRAVENOUS; SUBCUTANEOUS
Status: DISCONTINUED | OUTPATIENT
Start: 2020-01-29 | End: 2020-02-01 | Stop reason: HOSPADM

## 2020-01-28 RX ORDER — ONDANSETRON 2 MG/ML
4 INJECTION INTRAMUSCULAR; INTRAVENOUS EVERY 6 HOURS PRN
Status: DISCONTINUED | OUTPATIENT
Start: 2020-01-28 | End: 2020-02-01 | Stop reason: HOSPADM

## 2020-01-28 RX ORDER — RISPERIDONE 0.5 MG/1
0.5 TABLET, FILM COATED ORAL DAILY
Status: DISCONTINUED | OUTPATIENT
Start: 2020-01-29 | End: 2020-01-31

## 2020-01-28 RX ADMIN — INSULIN GLARGINE 10 UNITS: 100 INJECTION, SOLUTION SUBCUTANEOUS at 23:49

## 2020-01-28 RX ADMIN — SODIUM CHLORIDE: 9 INJECTION, SOLUTION INTRAVENOUS at 22:24

## 2020-01-28 RX ADMIN — INSULIN LISPRO 1 UNITS: 100 INJECTION, SOLUTION INTRAVENOUS; SUBCUTANEOUS at 23:49

## 2020-01-28 ASSESSMENT — PAIN DESCRIPTION - ONSET: ONSET: ON-GOING

## 2020-01-28 ASSESSMENT — ENCOUNTER SYMPTOMS
APNEA: 0
VOMITING: 0
DIARRHEA: 0
ABDOMINAL PAIN: 0
CONSTIPATION: 0
COUGH: 0
NAUSEA: 0
BACK PAIN: 0
SHORTNESS OF BREATH: 0

## 2020-01-28 ASSESSMENT — PAIN DESCRIPTION - PAIN TYPE: TYPE: ACUTE PAIN

## 2020-01-28 ASSESSMENT — PAIN DESCRIPTION - DIRECTION: RADIATING_TOWARDS: DOWN LEGS

## 2020-01-28 ASSESSMENT — PAIN DESCRIPTION - DESCRIPTORS: DESCRIPTORS: ACHING;PRESSURE

## 2020-01-28 ASSESSMENT — PAIN DESCRIPTION - LOCATION: LOCATION: BACK

## 2020-01-28 ASSESSMENT — PAIN DESCRIPTION - PROGRESSION: CLINICAL_PROGRESSION: NOT CHANGED

## 2020-01-28 ASSESSMENT — PAIN DESCRIPTION - FREQUENCY: FREQUENCY: INTERMITTENT

## 2020-01-28 ASSESSMENT — PAIN - FUNCTIONAL ASSESSMENT: PAIN_FUNCTIONAL_ASSESSMENT: PREVENTS OR INTERFERES SOME ACTIVE ACTIVITIES AND ADLS

## 2020-01-28 ASSESSMENT — PAIN SCALES - GENERAL: PAINLEVEL_OUTOF10: 6

## 2020-01-28 ASSESSMENT — PAIN DESCRIPTION - ORIENTATION: ORIENTATION: LOWER

## 2020-01-28 NOTE — ED PROVIDER NOTES
TAKE ONE TABLET BY MOUTH DAILY  Qty: 90 tablet, Refills: 0    Associated Diagnoses: Poor dentition      warfarin (COUMADIN) 5 MG tablet TAKE ONE-HALF TABLET ON MONDAYS AND FRIDAYS AND TAKE ONE TABLET ALL OTHER DAYS  Qty: 80 tablet, Refills: 1    Associated Diagnoses: Aortic valve disorder      tamsulosin (FLOMAX) 0.4 MG capsule TAKE ONE CAPSULE BY MOUTH DAILY  Qty: 90 capsule, Refills: 3    Associated Diagnoses: Benign prostatic hyperplasia, unspecified whether lower urinary tract symptoms present      MYRBETRIQ 25 MG TB24 TAKE ONE TABLET BY MOUTH DAILY  Qty: 60 tablet, Refills: 0    Associated Diagnoses: Urge incontinence of urine      atorvastatin (LIPITOR) 40 MG tablet TAKE ONE TABLET BY MOUTH DAILY  Qty: 90 tablet, Refills: 0    Associated Diagnoses: Pure hypercholesterolemia      memantine (NAMENDA) 5 MG tablet TAKE ONE TABLET BY MOUTH DAILY  Qty: 90 tablet, Refills: 1    Associated Diagnoses: Dementia without behavioral disturbance, unspecified dementia type (HCC)      digoxin (DIGOX) 125 MCG tablet TAKE ONE TABLET BY MOUTH DAILY  Qty: 90 tablet, Refills: 0      furosemide (LASIX) 20 MG tablet TAKE ONE TABLET BY MOUTH ON MONDAY, WEDNESDAY AND FRIDAY  Qty: 30 tablet, Refills: 4    Associated Diagnoses: (HFpEF) heart failure with preserved ejection fraction (HCC)      mupirocin (BACTROBAN) 2 % ointment Apply topically daily For 10 days for wounds on scalp      doxycycline hyclate (VIBRAMYCIN) 100 MG capsule Take 1 capsule by mouth 2 times daily For 10 days      clotrimazole-betamethasone (LOTRISONE) 1-0.05 % cream Apply topically 2 times daily. Qty: 45 g, Refills: 2      alendronate (FOSAMAX) 70 MG tablet TAKE 1 TABLET BY MOUTH ONCE WEEKLY BEFORE BREAKFAST, ON AN EMPTY STOMACH: REMAIN UPRIGHT FOR 30 MINUTES  Qty: 12 tablet, Refills: 0      Omega-3 Fatty Acids (FISH OIL) 1000 MG CPDR Take 1 capsule by mouth daily.       acetaminophen (TYLENOL) 325 MG tablet Take 2 tablets by mouth every 6 hours as needed for Pain or Fever (For mild pain level 1-3 or for fever > 100.5). aspirin 81 MG EC tablet Take 81 mg by mouth daily. Cholecalciferol (VITAMIN D) 1000 UNIT TABS Take 2 tablets by mouth daily. Coenzyme Q10 (CO Q-10 PO) Take 1 tablet by mouth daily. Multiple Vitamin (MULTIVITAMIN PO) Take 1 tablet by mouth daily. Allergies     He has No Known Allergies. Physical Exam     INITIAL VITALS: /89   Pulse 65   Temp 97.9 °F (36.6 °C) (Oral)   Resp 11   SpO2 99%    Physical Exam  Vitals signs and nursing note reviewed. Constitutional:       General: He is not in acute distress. Appearance: Normal appearance. He is normal weight. He is not toxic-appearing. HENT:      Head: Normocephalic. Comments: All abrasion from last week on his forehead and scalp     Right Ear: External ear normal.      Left Ear: External ear normal.      Mouth/Throat:      Mouth: Mucous membranes are moist.   Eyes:      Extraocular Movements: Extraocular movements intact. Pupils: Pupils are equal, round, and reactive to light. Neck:      Comments: No cervical or thoracic tenderness mild lumbar tenderness no bruising or ecchymosis  Cardiovascular:      Rate and Rhythm: Normal rate. Pulses: Normal pulses. Heart sounds: Murmur present. No gallop. Pulmonary:      Effort: Pulmonary effort is normal.   Abdominal:      General: Abdomen is flat. Bowel sounds are normal. There is no distension. Palpations: There is no mass. Musculoskeletal: Normal range of motion. Comments: Small open lesion on his left leg no erythema or warmth surrounding this   Skin:     General: Skin is warm. Capillary Refill: Capillary refill takes less than 2 seconds. Neurological:      Mental Status: He is alert. Comments: He is awake alert knows he is at [facility] [employer] recognizes his wife.   Does move extremities spontaneously will not cooperate for full neurologic testing         Diagnostic Results EKG       RADIOLOGY:  XR CHEST PORTABLE   Final Result      1. Prior sternotomy with sternal suture wires in place and tortuous aorta. Persistent blunting of left costophrenic angle, likely chronic scarring and/or pleural fluid or consolidation   2. No acute interval change               CT Lumbar Spine WO Contrast   Final Result      Compression deformities of the L3 and L4 vertebral, which were present on the remote prior study as described. Multilevel degenerative changes, with no acute findings seen. CT Head WO Contrast   Final Result      Stable noncontrast CT the brain without acute hemorrhage, edema or hydrocephalus. Stable cerebral atrophy with left maxillary sinus disease, slightly improved compared to prior study            LABS:   Labs Reviewed   DIGOXIN LEVEL - Abnormal; Notable for the following components:       Result Value    Digoxin Lvl 0.7 (*)     All other components within normal limits    Narrative:     Performed at: [facility] - [place] [address] [address]   Phone [phone]   CBC WITH AUTO DIFFERENTIAL - Abnormal; Notable for the following components:    RBC 4.07 (*)     .3 (*)     Lymphocytes Absolute 0.9 (*)     All other components within normal limits    Narrative:     Performed at: [facility] - [place] [address] [place] [address] [address]   Phone [phone]   BASIC METABOLIC PANEL W/ REFLEX TO MG FOR LOW K - Abnormal; Notable for the following components:    Glucose 330 (*)     All other components within normal limits    Narrative:     Performed at: [facility] - [facility]  [address] [place] [address] [address]   Phone [phone]   BRAIN NATRIURETIC PEPTIDE - Abnormal; Notable for the following components:    Pro- (*)     All other components within normal limits    Narrative:     Performed at:   The [facility] [employer] - [facility]  [address] [facility] [address]   Phone [phone]   PROTIME-INR - Abnormal; Notable for the following components:    Protime 42.1 (*)     INR 3.58 (*)     All other components within normal limits    Narrative:     Performed at: The [address]  [address] [facility] [address]   Phone [phone]   HEPATIC FUNCTION PANEL - Abnormal; Notable for the following components:    Alkaline Phosphatase 140 (*)     AST 38 (*)     All other components within normal limits    Narrative:     Performed at: The [address]  [address] [facility] [address]   Phone [phone]   URINALYSIS - Abnormal; Notable for the following components:    Glucose, Ur 500 (*)     Blood, Urine MODERATE (*)     Protein, UA TRACE (*)     All other components within normal limits    Narrative:     Performed at: The [address]  [address] [facility] [address]   Phone [phone]   MICROSCOPIC URINALYSIS - Abnormal; Notable for the following components:    RBC, UA 3-5 (*)     Bacteria, UA Rare (*)     Amorphous, UA 2+ (*)     All other components within normal limits    Narrative:     Performed at: The [address]  [address] [facility] [address]   Phone [phone]   BLOOD GAS, VENOUS - Abnormal; Notable for the following components:    Carboxyhemoglobin 1.8 (*)     All other components within normal limits    Narrative:     Performed at: The [address]  [address] [facility] [address]   Phone [phone]   URINE CULTURE   TROPONIN    Narrative:     Performed at: The [address]  [address] [facility], [facility] [address]   Phone [phone]   LIPASE    Narrative:     Performed at:   The [address] [place] [address] Laboratory  [address]   Phone [phone]   LACTIC ACID, PLASMA    Narrative:     Performed at: The [facility] - [facility]  [address]   Phone [phone]   HEMOGLOBIN A1C       BEDSIDE ULTRASOUND:      VITALS:  BP: 136/89, Temp: 97.9 °F (36.6 °C), Pulse: 65, Resp: 11     Procedures         ED Course     The patient was given the followingmedications:  Orders Placed This Encounter   Medications    0.9 % sodium chloride bolus            CONSULTS:  [facility] CONSULT  [facility] TO [facility]     Michael Ferguson is a 80 y.o. male who presents with episode of what appears to be delirium. His blood sugar was elevated at 331. There is been no changes in medication he is not diabetic. He is not acidotic. He has not been on any steroids. He was given gentle fluid hydration. EG showed right bundle branch block with QTc 434. Troponin was negative. He seems to clear intermittently. Unclear the etiology no evidence of UTI repeat head CT was negative. INR was 3.54. Digoxin level was 0.7. At this time I spoke with Dr. Salvador Browning. The patient will be admitted for acute delirium and hyperglycemia        Clinical Impression     1. Altered mental status, unspecified altered mental status type    2. Hyperglycemia        Meenu Mckeon     PATIENT REFERRED TO:  Hank Sorenson MD  [address]  [facility]  [phone]            DISCHARGE MEDICATIONS:  Current Discharge Medication List          DISPOSITION Admitted 01/28/2020 08:53:04 PM      Jorge Luis Wynn MD  01/28/20 0112

## 2020-01-28 NOTE — ED TRIAGE NOTES
Pt arrived to ED via EMS for altered mental status. According to wife she was having trouble waking him up from his afternoon nap and once he was awake he was agitated and violent towards his wife and caregiver. Pt alert to self/place at this time and he is calm and cooperative during triage.

## 2020-01-29 ENCOUNTER — APPOINTMENT (OUTPATIENT)
Dept: CT IMAGING | Age: 85
DRG: 637 | End: 2020-01-29
Payer: MEDICARE

## 2020-01-29 LAB
A/G RATIO: 1 (ref 1.1–2.2)
ALBUMIN SERPL-MCNC: 3 G/DL (ref 3.4–5)
ALP BLD-CCNC: 122 U/L (ref 40–129)
ALT SERPL-CCNC: 26 U/L (ref 10–40)
ANION GAP SERPL CALCULATED.3IONS-SCNC: 13 MMOL/L (ref 3–16)
AST SERPL-CCNC: 32 U/L (ref 15–37)
BASOPHILS ABSOLUTE: 0.1 K/UL (ref 0–0.2)
BASOPHILS RELATIVE PERCENT: 0.6 %
BILIRUB SERPL-MCNC: 0.8 MG/DL (ref 0–1)
BUN BLDV-MCNC: 11 MG/DL (ref 7–20)
CALCIUM SERPL-MCNC: 8.7 MG/DL (ref 8.3–10.6)
CHLORIDE BLD-SCNC: 107 MMOL/L (ref 99–110)
CO2: 20 MMOL/L (ref 21–32)
CREAT SERPL-MCNC: 0.6 MG/DL (ref 0.8–1.3)
EOSINOPHILS ABSOLUTE: 0.4 K/UL (ref 0–0.6)
EOSINOPHILS RELATIVE PERCENT: 3.2 %
ESTIMATED AVERAGE GLUCOSE: 200.1 MG/DL
FOLATE: >20 NG/ML (ref 4.78–24.2)
GFR AFRICAN AMERICAN: >60
GFR NON-AFRICAN AMERICAN: >60
GLOBULIN: 3 G/DL
GLUCOSE BLD-MCNC: 136 MG/DL (ref 70–99)
GLUCOSE BLD-MCNC: 155 MG/DL (ref 70–99)
GLUCOSE BLD-MCNC: 155 MG/DL (ref 70–99)
GLUCOSE BLD-MCNC: 178 MG/DL (ref 70–99)
GLUCOSE BLD-MCNC: 183 MG/DL (ref 70–99)
HBA1C MFR BLD: 8.6 %
HCT VFR BLD CALC: 39.9 % (ref 40.5–52.5)
HEMOGLOBIN: 13.1 G/DL (ref 13.5–17.5)
INR BLD: 3.14 (ref 0.86–1.14)
LYMPHOCYTES ABSOLUTE: 1.1 K/UL (ref 1–5.1)
LYMPHOCYTES RELATIVE PERCENT: 7.7 %
MAGNESIUM: 1.8 MG/DL (ref 1.8–2.4)
MCH RBC QN AUTO: 32.8 PG (ref 26–34)
MCHC RBC AUTO-ENTMCNC: 32.8 G/DL (ref 31–36)
MCV RBC AUTO: 100 FL (ref 80–100)
MONOCYTES ABSOLUTE: 1.2 K/UL (ref 0–1.3)
MONOCYTES RELATIVE PERCENT: 8.5 %
NEUTROPHILS ABSOLUTE: 11.1 K/UL (ref 1.7–7.7)
NEUTROPHILS RELATIVE PERCENT: 80 %
PDW BLD-RTO: 13.3 % (ref 12.4–15.4)
PERFORMED ON: ABNORMAL
PLATELET # BLD: 263 K/UL (ref 135–450)
PMV BLD AUTO: 9 FL (ref 5–10.5)
POTASSIUM REFLEX MAGNESIUM: 4.7 MMOL/L (ref 3.5–5.1)
PROTHROMBIN TIME: 36.9 SEC (ref 10–13.2)
RBC # BLD: 3.99 M/UL (ref 4.2–5.9)
SODIUM BLD-SCNC: 140 MMOL/L (ref 136–145)
TOTAL PROTEIN: 6 G/DL (ref 6.4–8.2)
TSH REFLEX: 3.1 UIU/ML (ref 0.27–4.2)
URINE CULTURE, ROUTINE: NORMAL
VITAMIN B-12: 741 PG/ML (ref 211–911)
WBC # BLD: 13.9 K/UL (ref 4–11)

## 2020-01-29 PROCEDURE — 1200000000 HC SEMI PRIVATE

## 2020-01-29 PROCEDURE — 85610 PROTHROMBIN TIME: CPT

## 2020-01-29 PROCEDURE — 36415 COLL VENOUS BLD VENIPUNCTURE: CPT

## 2020-01-29 PROCEDURE — 99221 1ST HOSP IP/OBS SF/LOW 40: CPT | Performed by: NURSE PRACTITIONER

## 2020-01-29 PROCEDURE — 82607 VITAMIN B-12: CPT

## 2020-01-29 PROCEDURE — 97530 THERAPEUTIC ACTIVITIES: CPT

## 2020-01-29 PROCEDURE — 85025 COMPLETE CBC W/AUTO DIFF WBC: CPT

## 2020-01-29 PROCEDURE — 97167 OT EVAL HIGH COMPLEX 60 MIN: CPT

## 2020-01-29 PROCEDURE — 74178 CT ABD&PLV WO CNTR FLWD CNTR: CPT

## 2020-01-29 PROCEDURE — 6370000000 HC RX 637 (ALT 250 FOR IP): Performed by: STUDENT IN AN ORGANIZED HEALTH CARE EDUCATION/TRAINING PROGRAM

## 2020-01-29 PROCEDURE — 97162 PT EVAL MOD COMPLEX 30 MIN: CPT

## 2020-01-29 PROCEDURE — 2580000003 HC RX 258: Performed by: STUDENT IN AN ORGANIZED HEALTH CARE EDUCATION/TRAINING PROGRAM

## 2020-01-29 PROCEDURE — 82746 ASSAY OF FOLIC ACID SERUM: CPT

## 2020-01-29 PROCEDURE — 6360000004 HC RX CONTRAST MEDICATION: Performed by: INTERNAL MEDICINE

## 2020-01-29 PROCEDURE — 97110 THERAPEUTIC EXERCISES: CPT

## 2020-01-29 PROCEDURE — 83735 ASSAY OF MAGNESIUM: CPT

## 2020-01-29 PROCEDURE — 80053 COMPREHEN METABOLIC PANEL: CPT

## 2020-01-29 PROCEDURE — 99232 SBSQ HOSP IP/OBS MODERATE 35: CPT | Performed by: INTERNAL MEDICINE

## 2020-01-29 RX ORDER — WARFARIN SODIUM 5 MG/1
5 TABLET ORAL
Status: DISCONTINUED | OUTPATIENT
Start: 2020-02-02 | End: 2020-01-31

## 2020-01-29 RX ORDER — CASTOR OIL AND BALSAM, PERU 788; 87 MG/G; MG/G
OINTMENT TOPICAL 2 TIMES DAILY
Status: DISCONTINUED | OUTPATIENT
Start: 2020-01-29 | End: 2020-02-01 | Stop reason: HOSPADM

## 2020-01-29 RX ORDER — WARFARIN SODIUM 5 MG/1
2.5-5 TABLET ORAL SEE ADMIN INSTRUCTIONS
COMMUNITY
End: 2020-03-11

## 2020-01-29 RX ORDER — WARFARIN SODIUM 2.5 MG/1
2.5 TABLET ORAL
Status: DISCONTINUED | OUTPATIENT
Start: 2020-01-29 | End: 2020-01-31

## 2020-01-29 RX ORDER — ASCORBIC ACID 1000 MG
1 TABLET ORAL DAILY
COMMUNITY

## 2020-01-29 RX ADMIN — FAMOTIDINE 20 MG: 20 TABLET, FILM COATED ORAL at 10:25

## 2020-01-29 RX ADMIN — Medication 10 ML: at 21:31

## 2020-01-29 RX ADMIN — MELATONIN 2000 UNITS: at 10:24

## 2020-01-29 RX ADMIN — RISPERIDONE 0.5 MG: 0.5 TABLET ORAL at 11:40

## 2020-01-29 RX ADMIN — ACETAMINOPHEN 650 MG: 325 TABLET ORAL at 10:25

## 2020-01-29 RX ADMIN — CASTOR OIL AND BALSAM, PERU: 788; 87 OINTMENT TOPICAL at 21:20

## 2020-01-29 RX ADMIN — DIGOXIN 125 MCG: 125 TABLET ORAL at 10:24

## 2020-01-29 RX ADMIN — INSULIN LISPRO 1 UNITS: 100 INJECTION, SOLUTION INTRAVENOUS; SUBCUTANEOUS at 11:42

## 2020-01-29 RX ADMIN — MEMANTINE HYDROCHLORIDE 5 MG: 5 TABLET ORAL at 10:24

## 2020-01-29 RX ADMIN — IOPAMIDOL 80 ML: 755 INJECTION, SOLUTION INTRAVENOUS at 09:24

## 2020-01-29 RX ADMIN — ASPIRIN 81 MG: 81 TABLET, COATED ORAL at 10:25

## 2020-01-29 RX ADMIN — INSULIN GLARGINE 10 UNITS: 100 INJECTION, SOLUTION SUBCUTANEOUS at 21:19

## 2020-01-29 RX ADMIN — ATORVASTATIN CALCIUM 40 MG: 40 TABLET, FILM COATED ORAL at 10:25

## 2020-01-29 RX ADMIN — FAMOTIDINE 20 MG: 20 TABLET, FILM COATED ORAL at 21:19

## 2020-01-29 RX ADMIN — TAMSULOSIN HYDROCHLORIDE 0.4 MG: 0.4 CAPSULE ORAL at 10:25

## 2020-01-29 RX ADMIN — WARFARIN SODIUM 2.5 MG: 2.5 TABLET ORAL at 18:00

## 2020-01-29 RX ADMIN — INSULIN LISPRO 1 UNITS: 100 INJECTION, SOLUTION INTRAVENOUS; SUBCUTANEOUS at 21:19

## 2020-01-29 RX ADMIN — Medication 10 ML: at 00:06

## 2020-01-29 ASSESSMENT — PAIN DESCRIPTION - FREQUENCY: FREQUENCY: INTERMITTENT

## 2020-01-29 ASSESSMENT — PAIN DESCRIPTION - ORIENTATION: ORIENTATION: LOWER

## 2020-01-29 ASSESSMENT — PAIN DESCRIPTION - PROGRESSION
CLINICAL_PROGRESSION: GRADUALLY WORSENING
CLINICAL_PROGRESSION: NOT CHANGED

## 2020-01-29 ASSESSMENT — PAIN SCALES - GENERAL
PAINLEVEL_OUTOF10: 0

## 2020-01-29 ASSESSMENT — PAIN - FUNCTIONAL ASSESSMENT: PAIN_FUNCTIONAL_ASSESSMENT: PREVENTS OR INTERFERES WITH MANY ACTIVE NOT PASSIVE ACTIVITIES

## 2020-01-29 ASSESSMENT — PAIN SCALES - WONG BAKER
WONGBAKER_NUMERICALRESPONSE: 0
WONGBAKER_NUMERICALRESPONSE: 8

## 2020-01-29 ASSESSMENT — PAIN DESCRIPTION - DESCRIPTORS: DESCRIPTORS: PATIENT UNABLE TO DESCRIBE

## 2020-01-29 ASSESSMENT — PAIN DESCRIPTION - PAIN TYPE: TYPE: ACUTE PAIN

## 2020-01-29 ASSESSMENT — PAIN DESCRIPTION - ONSET: ONSET: SUDDEN

## 2020-01-29 ASSESSMENT — PAIN DESCRIPTION - LOCATION: LOCATION: BACK

## 2020-01-29 NOTE — PROGRESS NOTES
[facility] Wound Ostomy Continence Nurse  Follow-up Progress Note       NAME:  Kishan Bangura  MEDICAL RECORD NUMBER:  [id number]  AGE:  80 y.o. GENDER:  male  :  1932  TODAY'S DATE:  2020    Subjective:   Wound Identification:R buttock-mid, LLE  Wound Type: IAD/MASD, trauma  Contributing Factors:incontinent liquid stool, severe weakness        Patient Goal of Care:  [x] Wound Healing  [] Odor Control  [] Palliative Care  [] Pain Control   [] Other:     Objective:    /88   Pulse 82   Temp 96.5 °F (35.8 °C) (Axillary)   Resp 16   Ht 5' 10\" (1.778 m)   Wt 156 lb 9 oz (71 kg)   SpO2 98%   BMI 22.46 kg/m²   Mainor Risk Score: Mainor Scale Score: 14  Assessment:   Measurements:  Incision Sternum Anterior;Mid (Active)   Number of days:        Wound 20 Buttocks Right;Mid IAD/MASD partial thick openings (Active)   Number of days: 0       Wound 20 Pretibial Left trauma wounds within bruising/maroon scattered discolor (Active)   Number of days: 0       Response to treatment: pain when bottom wiped  Plan:   Plan of Care:  Venelex to combat IAD, spec. Mattress for c/o discomfort to back and bottom, leg wound care. See hari. To assess if tolerates spec. Mattress to consider for home. Specialty Bed Required : consdier/attempt  [x] Low Air Loss   [x] Pressure Redistribution  [] Fluid Immersion  [] Bariatric  [] Total Pressure Relief  [] Other:     Current Diet: DIET CARDIAC;   Discharge Plan:  Placement for patient upon discharge:home   Patient appropriate for Outpatient [address]: in place at home    Patient/Caregiver Teaching: spouse and pt.-etiology, treatment  Level of patient/caregiver understanding able to:   [x] Indicates understanding       [] Needs reinforcement  [] Unsuccessful      [x] Verbal Understanding  [] Demonstrated understanding       [] No evidence of learning  [] Refused teaching         [] N/A       Electronically signed by Katty Rico Rodri Connolly, RN, [address] on 1/29/2020 at 4:51 PM

## 2020-01-29 NOTE — PROGRESS NOTES
Physical Therapy    Facility/Department: [address]  Initial Assessment / treatment    NAME: Sherry Reis  : 1932  MRN: [id number]    Date of Service: 2020    Discharge Recommendations: Sherry Reis scored a 8/24 on the AM-PAC short mobility form. Current research shows that an AM-PAC score of 17 or less is typically not associated with a discharge to the patient's home setting. Based on the patients AM-PAC score and their current functional mobility deficits, it is recommended that the patient have 3-5 sessions per week of Physical Therapy at d/c to increase the patients independence. PT Equipment Recommendations  Equipment Needed: No    Assessment   Body structures, Functions, Activity limitations: Decreased functional mobility   Assessment: Pt is 80 y.o. male admit with acute encephalopathy. Pt is below his functional baseline. Pt is from home with wife and typically has assist from wife for transfers and amb but has been needing 2 person assist recently at home. Requires 2 person assist today for transfers. Unable to ambulate today due to decreased balance and generalized weakness. Not safe to return home unless 2 person assist is available 24hrs. Rec continued IP PT. Treatment Diagnosis: impaired gait and transfers  Prognosis: Good  Decision Making: Medium Complexity  PT Education: PT Role;Plan of Care; Functional Mobility Training;Transfer Training  Patient Education: would benefit from reinforcement  REQUIRES PT FOLLOW UP: Yes       Patient Diagnosis(es): The primary encounter diagnosis was Altered mental status, unspecified altered mental status type. A diagnosis of Hyperglycemia was also pertinent to this visit. has a past medical history of Aortic stenosis, Arthritis, Chronic a-fib, Dementia ([address] Hyperlipidemia, Hypertension, Kidney stones, Pneumonia, and SOB (shortness of breath).    has a past surgical history that includes Hemorrhoid surgery ([id number]); Ankle fracture surgery; Forearm fracture surgery; Kidney stone surgery; Tonsillectomy ([id number]); Testicle surgery; Aortic valve replacement (6/20/2012); and Colonoscopy. Restrictions  Position Activity Restriction  Other position/activity restrictions: Ambulate patient, up as tolerated   Vision/Hearing  Vision: Impaired(wears glasses - not here today)  Hearing: Exceptions to [facility]  Hearing Exceptions: Hard of hearing/hearing concerns     Subjective  General  Chart Reviewed: Yes  Additional Pertinent Hx: Admit 1/28 with AMS; head CT: (-) acute; Lspine CT: Compression deformities of the L3 and L4 vertebral, seen on previous imaging; abd/pelvic CT pending; PMHx: SOB, PNA, HTN, HLD, dementia, a-fib, arthritis, aortic stenosis, forearm fx surgery, aortic valve replacement, ankle fx surgery  Referring Practitioner: Elvis Blake MD  Diagnosis: acute metabolic encephalopathy  Subjective  Subjective: Pt found sitting in chair. Agreeable to PT. Denies pain.   Pain Screening  Patient Currently in Pain: Denies       Orientation  Orientation  Overall Orientation Status: Within Functional Limits(oriented to month and year, \"[facility]\", self)  Social/Functional History  Social/Functional History  Lives With: Spouse  Type of Home: Apartment  Home Layout: One level  Home Access: Stairs to enter without rails  Entrance Stairs - Number of Steps: 1  Bathroom Shower/Tub: Walk-in shower  Bathroom Toilet: Standard  Bathroom Equipment: Grab bars in shower  Home Equipment: Cane, Rolling walker  ADL Assistance: Needs assistance(Wife helps with dressing)  Homemaking Assistance: Needs assistance  Ambulation Assistance: Needs assistance(with rollator at times; pt reports needing 1-2 person assist recently)  Transfer Assistance: Needs assistance(needs assist recently)  Active : No  Occupation: Retired  Additional Comments: Pt reports no falls recently, but pt questionable historian  Cognition        Objective          AROM RLE (degrees)  RLE General AROM: knee ext limited ~15 deg, otherwise, WFL  AROM LLE (degrees)  LLE General AROM: knee ext limited ~10 deg, otherwise, WFL  Strength RLE  Comment: 4/5 hip flex, knee ext, ankle DF per MMT; grossly decreased per observation during functional transfers  Strength LLE  Comment: 4/5 hip flex, knee ext, ankle DF per MMT; grossly decreased per observation during functional transfers           Transfers  Sit to Stand: Dependent/Total(mod A x 2 from chair; x 2 trials; cues to push up from chair 1st trial; pt pulling on walker 2nd trial)  Stand to sit: Dependent/Total(mod A x 2; v/c and physical assist for hand placement)        Balance  Sitting - Static: Good  Sitting - Dynamic: Fair  Standing - Static: Poor  Comments: pt stood x 1 min, x 1.5 min with walker, mod A for balance during static stance, max A when performing pre-gait activities  Exercises  Comments: x 10 B seated DIANE fatima, APs   Treatment included LE ex, gait and transfer training, pt education.   Plan   Plan  Times per week: 2-5  Current Treatment Recommendations: Strengthening, Gait Training, Patient/Caregiver Education & Training, Balance Training, Functional Mobility Training, Transfer Training, Home Exercise Program  Safety Devices  Type of devices: Chair alarm in place, Nurse notified, Gait belt, Call light within reach, Left in chair(LEs elevated)    G-Code       OutComes Score                                                  -PAC Score  -PAC Inpatient Mobility Raw Score : 8 (01/29/20 1121)  AM-PAC Inpatient T-Scale Score : 28.52 (01/29/20 1121)  Mobility Inpatient CMS 0-100% Score: 86.62 (01/29/20 1121)  Mobility Inpatient CMS G-Code Modifier : CM (01/29/20 1121)          Goals  Short term goals  Time Frame for Short term goals: discharge  Short term goal 1: Pt will transfer sit <--> stand with mod A x 1  Short term goal 2: Pt will demo static stance x 1 min with walker and CGA  Short term goal 3: Pt will participate

## 2020-01-29 NOTE — PROGRESS NOTES
Wife made aware patient would have to supply home dose of Myrbetriq while in hospital as our pharmacy does not stock this medication. Wife verbalized understanding and indicated she would bring in as soon as possible.

## 2020-01-29 NOTE — PLAN OF CARE
Problem: Falls - Risk of:  Goal: Will remain free from falls  Description  Will remain free from falls  Outcome: Ongoing  Goal: Absence of physical injury  Description  Absence of physical injury  Outcome: Ongoing     Problem: Risk for Impaired Skin Integrity  Goal: Tissue integrity - skin and mucous membranes  Description  Structural intactness and normal physiological function of skin and  mucous membranes.   Outcome: Ongoing     Problem: Confusion - Acute:  Goal: Absence of continued neurological deterioration signs and symptoms  Description  Absence of continued neurological deterioration signs and symptoms  Outcome: Ongoing  Goal: Mental status will be restored to baseline  Description  Mental status will be restored to baseline  Outcome: Ongoing  Pt has dementia at baseline     Problem: Discharge Planning:  Goal: Ability to perform activities of daily living will improve  Description  Ability to perform activities of daily living will improve  Outcome: Ongoing     Problem: Injury - Risk of, Physical Injury:  Goal: Will remain free from falls  Description  Will remain free from falls  Outcome: Ongoing  Goal: Absence of physical injury  Description  Absence of physical injury  Outcome: Ongoing

## 2020-01-29 NOTE — CONSULTS
Clinical Pharmacy Progress Note    Admit date: 1/28/2020     Subjective/Objective:  Pt is a 80yom with PMHx that includes HTN, HLD, dementia, A.fib, and aortic stenosis s/p porcine AVR who is admitted with acute metabolic encephalopathy of unclear origin. Pharmacy is consulted to dose Warfarin per Dr. Gabby Malin  Target INR = 2.0-3.0 for h/o A.fib and AVR  Home dose  = 2.5mg daily except 5mg on Sundays  · Warfarin is managed by [place] [id number] anticoagulation clinic  · Last INR 1/10/20 = 2.1    Current anticoagulation regimen:  Warfarin - Pharmacy to dose    Date INR Warfarin   1/28 3.58 --   1/29 3.14                       Recent Labs     01/28/20  1712 01/29/20  0632    140   K 5.0 4.7    107   CO2 28 20*   BUN 13 11   CREATININE 0.9 0.6*   GLUCOSE 330* 155*       Estimated Creatinine Clearance: 87 mL/min (A) (based on SCr of 0.6 mg/dL (L)). Lab Results   Component Value Date    WBC 13.9 (H) 01/29/2020    HGB 13.1 (L) 01/29/2020    HCT 39.9 (L) 01/29/2020    .0 01/29/2020     01/29/2020       Lab Results   Component Value Date    PROTIME 36.9 (H) 01/29/2020    INR 3.14 (H) 01/29/2020       Height:  5' 10\" (177.8 cm)  Weight:  156 lb 9 oz (71 kg)      Prophylaxis:  VTE:  Warfarin  GI:  Famotidine    Vaccination screening:  Pneumonia:  Up to date  Influenza:  Up to date    Assessment/Plan:  1)  H/o A.fib and AVR, on Warfarin:  Pharmacy to dose, target INR = 2.0-3.0  · INR today = 3.14, down from 3.58 after holding yesterday's dose. · To prevent further significant downtrend in INR tomorrow, will resume Warfarin at home dose of 2.5mg daily except 5mg on sundays. · Daily INR will be monitored closely and dose adjustments will be made as appropriate.     Please call with questions--  Thanks--  Kim Xavier, PharmD, [facility], [address] or [phone] (wireless)  1/29/2020 1:29 PM

## 2020-01-29 NOTE — PROGRESS NOTES
Late Entry:    Pt arrives to floor confused but pleasant. He does not know why he is here. Pt placed on tele and is rate controlled a fib. MD perfect served to make aware. Assessment as documented. No family at bedside; unable to complete med req due to confusion. Fluids infusing per order. Admission complete as much as possible with confusion. Blood glucose is 179. Rojas draining to gravity. Fall precautions in place and AVASYS camera in room for safety. Will continue to round on pt for safety/needs.

## 2020-01-29 NOTE — PROGRESS NOTES
Progress Note  PGY-3    Hospital Day: 2                                                           Code:Full Code  Admit Date: 1/28/2020                                             PCP: Vicente Weiner MD                                SUBJECTIVE:     Chief Complaint   Patient presents with    Altered Mental Status       Interval Hx:    Patient admitted overnight for AMS (mostly agitation), thought to be 2/2 hyperglycemia and dehydration in the setting of new onset DM2, improved now w IVFs and insulin. Patient has no acute complaints at this time, denies n/v/d, SOB, cough, dysuria. MEDICATIONS:   Scheduled Meds:   warfarin  2.5 mg Oral Once per day on Mon Tue Wed Thu Fri Sat    And    [START ON 2/2/2020] warfarin  5 mg Oral Once per day on Sun    aspirin  81 mg Oral Daily    atorvastatin  40 mg Oral Daily    Vitamin D  2,000 Units Oral Daily    digoxin  125 mcg Oral Daily    memantine  5 mg Oral Daily    mirabegron  25 mg Oral Daily    risperiDONE  0.5 mg Oral Daily    tamsulosin  0.4 mg Oral Daily    insulin glargine  10 Units Subcutaneous Nightly    insulin lispro  0-6 Units Subcutaneous TID WC    insulin lispro  0-3 Units Subcutaneous Nightly    sodium chloride flush  10 mL Intravenous 2 times per day    famotidine  20 mg Oral BID      Continuous Infusions:   dextrose       PRN Meds:glucose, dextrose, glucagon (rDNA), dextrose, sodium chloride flush, magnesium hydroxide, ondansetron, acetaminophen    Allergies: No Known Allergies    PHYSICAL EXAM:       Vitals: /68   Pulse 76   Temp 96.7 °F (35.9 °C) (Oral)   Resp 16   Ht 5' 10\" (1.778 m)   Wt 156 lb 9 oz (71 kg)   SpO2 97%   BMI 22.46 kg/m²     I/O:      Intake/Output Summary (Last 24 hours) at 1/29/2020 1305  Last data filed at 1/29/2020 1215  Gross per 24 hour   Intake 727.33 ml   Output 625 ml   Net 102.33 ml     I/O this shift:  In: 240 [P.O.:240]  Out: 275 [Urine:275]  I/O last 3 completed shifts:   In: 487.3 [I.V.:487.3]  Out: 350 [Urine:350]    Physical Examination:   · General appearance: alert, appears stated age and cooperative  · Skin: Skin color, texture, turgor normal.   · HEENT: EOMI: Normocephalic, no lesions, without obvious abnormality. · Lungs: clear to auscultation bilaterally  · Heart: irregularly irregular rate and rhythm, S1, S2 normal, + systolic murmur, click, rub or gallop  · Abdomen: soft, non-tender; bowel sounds normal; no masses,  no organomegaly  · MSK: extremities normal, atraumatic, no cyanosis or edema  · Neurologic:  Mental status: Alert, oriented x2, thought content appropriate  · Lines: PIV    DATA:       Labs:  CBC:   Recent Labs     01/28/20  1712 01/29/20  0632   WBC 9.4 13.9*   HGB 13.6 13.1*   HCT 40.8 39.9*    263       BMP:   Recent Labs     01/28/20 1712 01/29/20  0632    140   K 5.0 4.7    107   CO2 28 20*   BUN 13 11   CREATININE 0.9 0.6*   GLUCOSE 330* 155*     LFT's:   Recent Labs     01/28/20  1712 01/29/20  0632   AST 38* 32   ALT 33 26   BILITOT 0.4 0.8   ALKPHOS 140* 122     Troponin:   Recent Labs     01/28/20 1712   TROPONINI <0.01     BNP: No results for input(s): BNP in the last 72 hours. ABGs: No results for input(s): PHART, VSE7XEQ, PO2ART in the last 72 hours. INR:   Recent Labs     01/28/20 1712 01/29/20  0632   INR 3.58* 3.14*     Lipids: No results for input(s): CHOL, HDL in the last 72 hours. Invalid input(s): LDLCALCU    U/A:  Recent Labs     01/28/20  1734   COLORU Yellow   PHUR 6.0   WBCUA 3-5   RBCUA 3-5*   BACTERIA Rare*   CLARITYU Clear   SPECGRAV 1.025   LEUKOCYTESUR Negative   UROBILINOGEN 0.2   BILIRUBINUR Negative   BLOODU MODERATE*   GLUCOSEU 500*   AMORPHOUS 2+*        Micro: cultures pending    ASSESSMENT AND PLAN:   Elida Gama is a 81 yo male with hx of HTN, HLD, dementia, aortic valve repair and Afib on coumadin who presents with altered mental status/agitation.     Acute metabolic encephalopathy   - unclear cause. Maybe 2/2 uncontrolled blood glucose and/or disrupted sleep cycle. Also on risperdal and namenda. UA unremarkable other than glucose. CXR w no PNA. CT head with no acute process. Digoxin level normal. Electrolytes normal.  - continue risperdal for now  - continue namenda  - TSH pending  - Vit B12 and folate pending  - Hemoglobin A1c 8.6, confirmed DM2, but no DKA  - hyperglycemia imroved, managed as below  - gentle IVF for 12 hours  - PT/OT      Hyperglycemia - 330 today. Last A1c 6.0 2016.  Patient may have DM now  - A1c 8.6  - nightly lantus 10 U  - sliding scale low dose  - transition to metformin on discharge  - hypoglycemia protocol    Hematuria  - both gross and apparent on UA, urine cleared up now  - CT abd/pelvis showed nonobstructing nephrolithiasis  - Urology consulted, will be managed as outpatient (either ESWL if patient AC stopped, or stent)    Afib  - continue digoxin  - continue coumadin, INR>3 1/29     Stage II sacral wound  - wound care     Aortic valve repair  - continue coumadin  - ASA     HLD   - continue lipitor     Discussed with attending physician Dr. Mancini Fail     Code Status:Full code  FEN: Cardiac  PPX: pepcid  DISPO: F, [facility] 8 and 12, likely DC tomorrow    Will discuss with attending physician,  Hank Sorenson MD   -----------------------------  Sarwat Cooper M.D. PGY-3, 1:05 PM

## 2020-01-29 NOTE — PROGRESS NOTES
Occupational Therapy     Occupational Therapy Initial Assessment  Date: 2020   Patient Name: Wilbert Hu  MRN: [id number]     : 1932    Date of Service: 2020  Assessment: Pt admitted s/p episode of aggitation and AMS, with history of demenita. Pt agreeable to therapy and tolerated session well. Functional mobility and ADL performance is significantly decreased from baseline. Per nurse report, home health has noticed a rapid decline in independence in recent days. Pt required max assist x1 for static standing at EOB, and required use of Narda Lang to be placed on transport stretcher. With patients decreased safety awareness and decreased awareness of deficits, anticipate assist will still be needed for ADL performance and functional mobility upon d/c. Recommend continued inpatient OT services upon d/c. Discharge Recommendations:Jarocho Ferreira scored a  on the AM-PAC ADL Inpatient form. Current research shows that an AM-PAC score of 17 or less is typically not associated with a discharge to the patient's home setting. Based on the patients AM-PAC score and their current ADL deficits, it is recommended that the patient have 3-5 sessions per week of Occupational Therapy at d/c to increase the patients independence. See assessment. OT Equipment Recommendations  Equipment Needed: No    Assessment   Performance deficits / Impairments: Decreased functional mobility ; Decreased ADL status; Decreased strength;Decreased safe awareness;Decreased cognition;Decreased endurance;Decreased balance;Decreased high-level IADLs;Decreased posture  Treatment Diagnosis: decreased activity tolerance; impaired ADLs and mobility   Decision Making: High Complexity  OT Education: OT Role;Plan of Care;Orientation; Energy Conservation;Transfer Training;ADL Adaptive Strategies  REQUIRES OT FOLLOW UP: Yes  Activity Tolerance  Activity Tolerance: Patient Tolerated treatment well;Treatment limited secondary to decreased cognition  Safety Devices  Safety Devices in place: Yes(Pt ended session by leaving with transport for CT; RN aware )             Treatment Diagnosis: decreased activity tolerance; impaired ADLs and mobility       Restrictions  Position Activity Restriction  Other position/activity restrictions: Ambulate patient, up as tolerated     Subjective   General  Chart Reviewed: Yes  Additional Pertinent Hx: 80 y.o. M admitted 1/28 for acute AMS and delerium. CT head shows no acute findings. PMHx: SOB, pneumonia, kidney stones, HTN, HLP, dementia, chronic a-fib, arthritis, aortic stenosis,  testicle surgery, kidney stone surgery, colonoscopy, aortic valve replacement, ankle fracture surgery. Referring Practitioner: Sunil Moe  Diagnosis: acute metabolic encephalopathy   Subjective  Subjective: Pt in bed upon entry. Pt confused but pleasant and agreeable to therapy.    Patient Currently in Pain: Denies  Pain Assessment  Pain Assessment: 0-10(unrated neck pain; RN aware )    Social/Functional History  Social/Functional History  Lives With: Spouse  Type of Home: Apartment  Home Layout: One level  Home Access: Stairs to enter without rails  Entrance Stairs - Number of Steps: 1  Bathroom Shower/Tub: Walk-in shower  Bathroom Toilet: Standard  Bathroom Equipment: Grab bars in shower  Home Equipment: Cane, Rolling walker  ADL Assistance: Needs assistance(Wife helps with dressing)  Homemaking Assistance: Needs assistance  Ambulation Assistance: Needs assistance(with rollator at times; pt reports needing 1-2 person assist recently)  Transfer Assistance: Needs assistance(needs assist recently)  Active : No  Occupation: Retired  Additional Comments: Pt reports no falls recently, but pt questionable historian       Objective   Vision: Within Functional Limits  Hearing: Exceptions to [facility]  Hearing Exceptions: Hard of hearing/hearing concerns    Orientation  Overall Orientation Status: Impaired  Orientation Level: memory  Safety Judgement: Decreased awareness of need for assistance  Problem Solving: Able to problem solve independently;Decreased awareness of errors  Insights: Decreased awareness of deficits  Initiation: Requires cues for all  Sequencing: Requires cues for some        Sensation  Overall Sensation Status: WFL        LUE AROM (degrees)  LUE AROM : WFL  RUE AROM (degrees)  RUE AROM : WFL           Plan   Plan  Times per week: 2-5  Times per day: Daily  Current Treatment Recommendations: Strengthening, Endurance Training, Equipment Evaluation, Education, & procurement, Self-Care / ADL, Balance Training, Functional Mobility Training, Safety Education & Training    If pt discharges prior to next treatment, this note will serve as d/c summary. Continue per POC if pt does not d/c. AM-PAC Score  AM-PAC Inpatient Daily Activity Raw Score: 12 (01/29/20 1037)  AM-PAC Inpatient ADL T-Scale Score : 30.6 (01/29/20 1037)  ADL Inpatient CMS 0-100% Score: 66.57 (01/29/20 1037)  ADL Inpatient CMS G-Code Modifier : CL (01/29/20 1037)    Goals  Short term goals  Time Frame for Short term goals: by d/c   Short term goal 1: static sit at EOB ~ 15 min with SBA to prepare for ADL participation - not met   Short term goal 2: Tolerate 3 min static stance with CGA - not met   Short term goal 3: complete BSC transfer with Mod A - not met   Patient Goals   Patient goals : to go home        Therapy Time   Individual Concurrent Group Co-treatment   Time In 0835         Time Out 0918         Minutes 43         Timed Code Tx Min:28  Total Tx Time:43    Therapist was present, directed the patient's care, made skilled judgment, and was responsible for assessment and treatment of the patient.       Guido Marshall, s/OT   (Romeo Brice, OTR/L, [id number])

## 2020-01-29 NOTE — CONSULTS
[facility]  Palliative Medicine Consultation Note      Date Of Admission:1/28/2020  Date of consult: 01/29/20  Seen by [facility] in the past:  No    Recommendations:        Met with the pt this morning, introduced palliative care. No family at the bedside. Pt is alert to self and place, extremely [place] and does not seem to understand some of the questions asked. He denies pain, or SOB. Does not know why he is here. Per RN report he did not eat breakfast this morning. Called pt's wife Khloe Champion, will meet with her this afternoon. She plans to arrive to the hospital around noon today. Addendum: Met with pt's wife and daughter at the bedside. Pt lives with wife, who is his primary caregiver. They have a private caregiver who comes out in the morning and at night to assist pt with getting in and out of bed as the pt's wife is not able to life him herself. She states starting February 3rd they will also be starting PT/OT with [facility]. Pt's wife has concerns about pt going to SNF for rehab at discharge as this may cause him to become agitated, she feels he would be more comfortable at home. She feels at this time, with the assistance of the caregiver she is able to care for the pt adequately at home. Denies need for further home care. Discussed code status and resuscitation - per pt's wife and Luisa Chavez she does not think that the pt would want chest compressions or intubation in the event of cardiac arrest. Code status updated to [facility]. D/w Dr. Melissa Olivier and ONDINA Rudolph      1. Goals of Care/Advanced Care planning/Code status: DNRCC-A, continue with current management. .  2. Pain: Pt denies, per RN report he does c/o back pain when he gets up. He has orders for tylenol 650mg PO as needed for pain. 3. SOB: Pt denies any SOB, does not appear to be in any acute distress.  Resting comfortably in chair on room air   Disposition: Likely home with PT/OT and home care at discharge     Reason for Consult: Daily  risperiDONE (RISPERDAL) tablet 0.5 mg, 0.5 mg, Oral, Daily  tamsulosin (FLOMAX) capsule 0.4 mg, 0.4 mg, Oral, Daily  insulin glargine (LANTUS) injection pen 10 Units, 10 Units, Subcutaneous, Nightly  insulin lispro (1 Unit Dial) 0-6 Units, 0-6 Units, Subcutaneous, TID WC  insulin lispro (1 Unit Dial) 0-3 Units, 0-3 Units, Subcutaneous, Nightly  glucose (GLUTOSE) 40 % oral gel 15 g, 15 g, Oral, PRN  dextrose 50 % IV solution, 12.5 g, Intravenous, PRN  glucagon (rDNA) injection 1 mg, 1 mg, Intramuscular, PRN  dextrose 5 % solution, 100 mL/hr, Intravenous, PRN  sodium chloride flush 0.9 % injection 10 mL, 10 mL, Intravenous, 2 times per day  sodium chloride flush 0.9 % injection 10 mL, 10 mL, Intravenous, PRN  magnesium hydroxide (MILK OF MAGNESIA) 400 MG/5ML suspension 30 mL, 30 mL, Oral, Daily PRN  ondansetron (ZOFRAN) injection 4 mg, 4 mg, Intravenous, Q6H PRN  famotidine (PEPCID) tablet 20 mg, 20 mg, Oral, BID  acetaminophen (TYLENOL) tablet 650 mg, 650 mg, Oral, Q4H PRN  0.9 % sodium chloride infusion, , Intravenous, Continuous    Allergies:  Patient has no known allergies. Social History:    Lives with wife, has private caregiver 2-3 hrs/day    Review of Systems -   Unable to perform, pt poor historian - does not seem to understand questions asked.      Objective:        PHYSICAL EXAM:    /60   Pulse 83   Temp 96.4 °F (35.8 °C) (Axillary)   Resp 16   Ht 5' 10\" (1.778 m)   Wt 156 lb 9 oz (71 kg)   SpO2 97%   BMI 22.46 kg/m²   General appearance: alert, cooperative and slowed mentation  Lungs: clear to auscultation bilaterally  Heart: regular rate and rhythm, S1, S2 normal, no murmur, click, rub or gallop  Abdomen: soft, non-tender; bowel sounds normal; no masses,  no organomegaly  Extremities: abrasions to bilateral shins, in various stages of healing  Skin: abrasions to bilateral anterior shins, in various stages of healing  Neurologic: Mental status: alertness: alert, orientation: person, place    Palliative Performance Scale:  60% ? Ambulation reduced; Significant disease; Can't do hobbies/housework; intake normal   or reduced; occasional assist; LOC full/confusion  50% ? Mainly sit/lie; Extensive disease; Can't do any work; Considerable assist; intake normal  Or reduced; LOC full/confusion  40% ? Mainly in bed; Extensive disease; Mainly assist; intake normal or reduced; occasional assist; LOC full/confusion  30% ? Bed Bound; Extensive disease; Total care; intake reduced; LOC full/confusion  20% ? Bed Bound; Extensive disease; Total care; intake minimal; Drowsy/coma  10% ? Bed Bound; Extensive disease; Total care; Mouth care only; Drowsy/coma  0 ? Death    PPS: 48    Vitals:    BP (!) 141/83   Pulse 93   Temp 97.9 °F (36.6 °C) (Oral)   Resp 18   Ht 5' 10\" (1.778 m)   Wt 156 lb 9 oz (71 kg)   SpO2 97%   BMI 22.46 kg/m²     DATA:    CBC:   Lab Results   Component Value Date    WBC 13.9 01/29/2020    RBC 3.99 01/29/2020    HGB 13.1 01/29/2020    HCT 39.9 01/29/2020    .0 01/29/2020    MCH 32.8 01/29/2020    MCHC 32.8 01/29/2020    RDW 13.3 01/29/2020     01/29/2020    MPV 9.0 01/29/2020     BMP:    Lab Results   Component Value Date     01/29/2020    K 4.7 01/29/2020     01/29/2020    CO2 20 01/29/2020    BUN 11 01/29/2020    LABALBU 3.0 01/29/2020    CREATININE 0.6 01/29/2020    CALCIUM 8.7 01/29/2020    GFRAA >60 01/29/2020    GFRAA >60 04/09/2013    LABGLOM >60 01/29/2020    GLUCOSE 155 01/29/2020    GLUCOSE 122 07/14/2011     Albumin:    Lab Results   Component Value Date    LABALBU 3.0 01/29/2020         Conclusion/Time spent:         Recommendations see above    Time spent with patient and/or family: 20  Time reviewing records: 5  Time communicating with staff: 5    A total of 30 minutes spent with the patient and family on unit greater than 50% in counseling regarding palliative care and in goals of care for the patient.     Thank you to Dr. Birdia Ormond for this

## 2020-01-29 NOTE — H&P
Internal Medicine  PGY 3  History & Physical      CC AMS    History Obtained From:  patient    HISTORY OF PRESENT ILLNESS:  Kaycee Fuentes is a 81 yo male with hx of HTN, HLD, dementia, aortic valve repair and Afib on coumadin who presents with altered mental status. Family noticed patient was more verbally aggressive this morning along with some confusion. He has baseline dementia but family reported agitation was the first time today. Mentation seems to be waxing and waning and he has been sleeping most of the day and night. He is on risperdal and namenda. Denies headache, fever, cough, chest pain, abdominal pain, dysuria, or diarrhea. His blood glucose reading was elevated in the ED. He knows where he is and he said this is January but he is also very hard of hearing. Past Medical History:        Diagnosis Date    Aortic stenosis     Arthritis     Chronic a-fib     Dementia (HCC)     Hyperlipidemia     Hypertension     Kidney stones     Pneumonia     distant past    SOB (shortness of breath)        Past Surgical History:        Procedure Laterality Date    ANKLE FRACTURE SURGERY      AORTIC VALVE REPLACEMENT  6/20/2012    DR. Chand - justin     COLONOSCOPY      FOREARM FRACTURE SURGERY      HEMORRHOID SURGERY  [id number]    KIDNEY STONE SURGERY      TESTICLE SURGERY      fluid removed    TONSILLECTOMY  [id number]    adenoidectomy       Medications Priorto Admission:    Medications Prior to Admission: risperiDONE (RISPERDAL) 0.5 MG tablet, TAKE ONE TABLET BY MOUTH DAILY  warfarin (COUMADIN) 5 MG tablet, TAKE ONE-HALF TABLET ON MONDAYS AND FRIDAYS AND TAKE ONE TABLET ALL OTHER DAYS  tamsulosin (FLOMAX) 0.4 MG capsule, TAKE ONE CAPSULE BY MOUTH DAILY  MYRBETRIQ 25 MG TB24, TAKE ONE TABLET BY MOUTH DAILY  atorvastatin (LIPITOR) 40 MG tablet, TAKE ONE TABLET BY MOUTH DAILY  memantine (NAMENDA) 5 MG tablet, TAKE ONE TABLET BY MOUTH DAILY  digoxin (DIGOX) 125 MCG tablet, TAKE ONE TABLET BY MOUTH COLORU Yellow   PHUR 6.0   WBCUA 3-5   RBCUA 3-5*   BACTERIA Rare*   CLARITYU Clear   SPECGRAV 1.025   LEUKOCYTESUR Negative   UROBILINOGEN 0.2   BILIRUBINUR Negative   BLOODU MODERATE*   GLUCOSEU 500*   AMORPHOUS 2+*       XR CHEST PORTABLE   Final Result      1. Prior sternotomy with sternal suture wires in place and tortuous aorta. Persistent blunting of left costophrenic angle, likely chronic scarring and/or pleural fluid or consolidation   2. No acute interval change               CT Lumbar Spine WO Contrast   Final Result      Compression deformities of the L3 and L4 vertebral, which were present on the remote prior study as described. Multilevel degenerative changes, with no acute findings seen. CT Head WO Contrast   Final Result      Stable noncontrast CT the brain without acute hemorrhage, edema or hydrocephalus. Stable cerebral atrophy with left maxillary sinus disease, slightly improved compared to prior study        ASSESSMENT AND PLAN:  John Kitchen is a 79 yo male with hx of HTN, HLD, dementia, aortic valve repair and Afib on coumadin who presents with altered mental status. Acute metabolic encephalopathy - unclear cause. Maybe 2/2 uncontrolled blood glucose and/or disrupted sleep cycle. Also on risperdal and namenda. UA unremarkable other than glucose. CXR with some blunting for costophrenic angle but no clinical signs of pneumonia. CT head with no acute process. Digoxin level normal. Electrolytes normal.  - continue risperdal for now  - continue namenda  - TSH  - Vit B12  - Hemoglobin A1c  - control hyperglycemia  - gentle IVF for 12 hours  - PT/OT    Afib  - continue digoxin  - continue coumadin    Stage II sacral wound  - wound care    Aortic valve repair  - continue coumadin  - ASA    Hyperglycemia - 330 today. Last A1c 6.0 2016.  Patient may have DM now  - A1c  - nightly lantus 10 U  - sliding scale low dose  - hypoglycemia protocol    HLD   - continue lipitor    Discussed with attending physician Dr. Juanis Bee Status:Full code  FEN: Cardiac  PPX: pepcid  DISPO: Liliana Kingsley MD  1/28/2020,  11:28 PM

## 2020-01-30 LAB
A/G RATIO: 1.3 (ref 1.1–2.2)
ALBUMIN SERPL-MCNC: 3.2 G/DL (ref 3.4–5)
ALP BLD-CCNC: 114 U/L (ref 40–129)
ALT SERPL-CCNC: 20 U/L (ref 10–40)
ANION GAP SERPL CALCULATED.3IONS-SCNC: 10 MMOL/L (ref 3–16)
AST SERPL-CCNC: 24 U/L (ref 15–37)
BASOPHILS ABSOLUTE: 0.1 K/UL (ref 0–0.2)
BASOPHILS RELATIVE PERCENT: 0.7 %
BILIRUB SERPL-MCNC: 0.8 MG/DL (ref 0–1)
BUN BLDV-MCNC: 9 MG/DL (ref 7–20)
CALCIUM SERPL-MCNC: 8.4 MG/DL (ref 8.3–10.6)
CHLORIDE BLD-SCNC: 106 MMOL/L (ref 99–110)
CO2: 23 MMOL/L (ref 21–32)
CREAT SERPL-MCNC: 0.7 MG/DL (ref 0.8–1.3)
EOSINOPHILS ABSOLUTE: 0.3 K/UL (ref 0–0.6)
EOSINOPHILS RELATIVE PERCENT: 2.5 %
GFR AFRICAN AMERICAN: >60
GFR NON-AFRICAN AMERICAN: >60
GLOBULIN: 2.5 G/DL
GLUCOSE BLD-MCNC: 106 MG/DL (ref 70–99)
GLUCOSE BLD-MCNC: 106 MG/DL (ref 70–99)
GLUCOSE BLD-MCNC: 109 MG/DL (ref 70–99)
GLUCOSE BLD-MCNC: 182 MG/DL (ref 70–99)
GLUCOSE BLD-MCNC: 235 MG/DL (ref 70–99)
HCT VFR BLD CALC: 35.2 % (ref 40.5–52.5)
HEMOGLOBIN: 12 G/DL (ref 13.5–17.5)
INR BLD: 2.48 (ref 0.86–1.14)
LYMPHOCYTES ABSOLUTE: 0.9 K/UL (ref 1–5.1)
LYMPHOCYTES RELATIVE PERCENT: 8.5 %
MCH RBC QN AUTO: 33.5 PG (ref 26–34)
MCHC RBC AUTO-ENTMCNC: 34.1 G/DL (ref 31–36)
MCV RBC AUTO: 98.2 FL (ref 80–100)
MONOCYTES ABSOLUTE: 1.1 K/UL (ref 0–1.3)
MONOCYTES RELATIVE PERCENT: 9.9 %
NEUTROPHILS ABSOLUTE: 8.7 K/UL (ref 1.7–7.7)
NEUTROPHILS RELATIVE PERCENT: 78.4 %
PDW BLD-RTO: 13.1 % (ref 12.4–15.4)
PERFORMED ON: ABNORMAL
PLATELET # BLD: 252 K/UL (ref 135–450)
PMV BLD AUTO: 9 FL (ref 5–10.5)
POTASSIUM REFLEX MAGNESIUM: 3.8 MMOL/L (ref 3.5–5.1)
PROTHROMBIN TIME: 29 SEC (ref 10–13.2)
RBC # BLD: 3.59 M/UL (ref 4.2–5.9)
SODIUM BLD-SCNC: 139 MMOL/L (ref 136–145)
TOTAL PROTEIN: 5.7 G/DL (ref 6.4–8.2)
WBC # BLD: 11 K/UL (ref 4–11)

## 2020-01-30 PROCEDURE — 2580000003 HC RX 258: Performed by: STUDENT IN AN ORGANIZED HEALTH CARE EDUCATION/TRAINING PROGRAM

## 2020-01-30 PROCEDURE — 97530 THERAPEUTIC ACTIVITIES: CPT

## 2020-01-30 PROCEDURE — 36415 COLL VENOUS BLD VENIPUNCTURE: CPT

## 2020-01-30 PROCEDURE — 1200000000 HC SEMI PRIVATE

## 2020-01-30 PROCEDURE — 6370000000 HC RX 637 (ALT 250 FOR IP): Performed by: STUDENT IN AN ORGANIZED HEALTH CARE EDUCATION/TRAINING PROGRAM

## 2020-01-30 PROCEDURE — 85025 COMPLETE CBC W/AUTO DIFF WBC: CPT

## 2020-01-30 PROCEDURE — 99232 SBSQ HOSP IP/OBS MODERATE 35: CPT | Performed by: INTERNAL MEDICINE

## 2020-01-30 PROCEDURE — 85610 PROTHROMBIN TIME: CPT

## 2020-01-30 PROCEDURE — 80053 COMPREHEN METABOLIC PANEL: CPT

## 2020-01-30 PROCEDURE — 97116 GAIT TRAINING THERAPY: CPT

## 2020-01-30 RX ADMIN — ATORVASTATIN CALCIUM 40 MG: 40 TABLET, FILM COATED ORAL at 10:03

## 2020-01-30 RX ADMIN — Medication 10 ML: at 20:24

## 2020-01-30 RX ADMIN — INSULIN LISPRO 2 UNITS: 100 INJECTION, SOLUTION INTRAVENOUS; SUBCUTANEOUS at 18:04

## 2020-01-30 RX ADMIN — ASPIRIN 81 MG: 81 TABLET, COATED ORAL at 10:06

## 2020-01-30 RX ADMIN — TAMSULOSIN HYDROCHLORIDE 0.4 MG: 0.4 CAPSULE ORAL at 10:03

## 2020-01-30 RX ADMIN — DIGOXIN 125 MCG: 125 TABLET ORAL at 10:03

## 2020-01-30 RX ADMIN — FAMOTIDINE 20 MG: 20 TABLET, FILM COATED ORAL at 20:22

## 2020-01-30 RX ADMIN — FAMOTIDINE 20 MG: 20 TABLET, FILM COATED ORAL at 10:03

## 2020-01-30 RX ADMIN — INSULIN GLARGINE 10 UNITS: 100 INJECTION, SOLUTION SUBCUTANEOUS at 20:23

## 2020-01-30 RX ADMIN — MELATONIN 2000 UNITS: at 10:03

## 2020-01-30 RX ADMIN — CASTOR OIL AND BALSAM, PERU: 788; 87 OINTMENT TOPICAL at 20:23

## 2020-01-30 RX ADMIN — CASTOR OIL AND BALSAM, PERU: 788; 87 OINTMENT TOPICAL at 10:09

## 2020-01-30 RX ADMIN — WARFARIN SODIUM 2.5 MG: 2.5 TABLET ORAL at 18:04

## 2020-01-30 RX ADMIN — MEMANTINE HYDROCHLORIDE 5 MG: 5 TABLET ORAL at 10:03

## 2020-01-30 RX ADMIN — INSULIN LISPRO 1 UNITS: 100 INJECTION, SOLUTION INTRAVENOUS; SUBCUTANEOUS at 20:22

## 2020-01-30 RX ADMIN — RISPERIDONE 0.5 MG: 0.5 TABLET ORAL at 10:08

## 2020-01-30 RX ADMIN — Medication 10 ML: at 10:09

## 2020-01-30 ASSESSMENT — PAIN DESCRIPTION - PROGRESSION
CLINICAL_PROGRESSION: NOT CHANGED

## 2020-01-30 ASSESSMENT — PAIN SCALES - GENERAL
PAINLEVEL_OUTOF10: 0
PAINLEVEL_OUTOF10: 0

## 2020-01-30 NOTE — PLAN OF CARE
Problem: Risk for Impaired Skin Integrity  Goal: Tissue integrity - skin and mucous membranes  Description  Structural intactness and normal physiological function of skin and  mucous membranes. 1/30/2020 0228 by Radonna Schaumann, RN  Outcome: Ongoing  Note:   Pt at risk for skin breakdown. Skin assessment as documented. Pts skin cleansed with inc cleanser as needed. Pt repositioned in bed with pillow support as needed. Call light within reach. Will continue to monitor. Problem: Confusion - Acute:  Goal: Absence of continued neurological deterioration signs and symptoms  Description  Absence of continued neurological deterioration signs and symptoms  1/30/2020 0228 by Radonna Schaumann, RN  Outcome: Ongoing  Note:   Pt has not shown any major signs or symptoms of neurological deterioration so far this shift. Pt has asked for his wife repeatedly earlier in the shift, but is now resting quietly in bed at this time. Will continue to follow pt. Problem: Falls - Risk of:  Goal: Will remain free from falls  Description  Will remain free from falls  1/30/2020 0228 by Radonna Schaumann, RN  Outcome: Ongoing  Note:   Patient is a fall risk. See Fall Risk assessment for details. Bed is in low, lock position; call light/belongings within reach. A couple attempts to exit the bed unassisted have been made but pt was easily re directed. Bed alarm and hourly rounds in place for safety. Will continue to monitor and reassess throughout shift.

## 2020-01-30 NOTE — PROGRESS NOTES
Progress Note  PGY-3    Hospital Day: 3                                                           Code:DNR-CCA  Admit Date: 1/28/2020                                             PCP: Cindy Quintana MD                                SUBJECTIVE:     Chief Complaint   Patient presents with    Altered Mental Status       Interval Hx:    No acute events overnight. Patient reportedly somewhat confused and agitated, pulling off tele leads overnight but calmed down wout medical intervention. Glucose stable. Patient has no acute complaints at this time, denies n/v/d, SOB, cough, dysuria. MEDICATIONS:   Scheduled Meds:   warfarin  2.5 mg Oral Once per day on Mon Tue Wed Thu Fri Sat    And    [START ON 2/2/2020] warfarin  5 mg Oral Once per day on Sun    Venelex   Topical BID    aspirin  81 mg Oral Daily    atorvastatin  40 mg Oral Daily    Vitamin D  2,000 Units Oral Daily    digoxin  125 mcg Oral Daily    memantine  5 mg Oral Daily    mirabegron  25 mg Oral Daily    risperiDONE  0.5 mg Oral Daily    tamsulosin  0.4 mg Oral Daily    insulin glargine  10 Units Subcutaneous Nightly    insulin lispro  0-6 Units Subcutaneous TID WC    insulin lispro  0-3 Units Subcutaneous Nightly    sodium chloride flush  10 mL Intravenous 2 times per day    famotidine  20 mg Oral BID      Continuous Infusions:   dextrose       PRN Meds:glucose, dextrose, glucagon (rDNA), dextrose, sodium chloride flush, magnesium hydroxide, ondansetron, acetaminophen    Allergies: No Known Allergies    PHYSICAL EXAM:       Vitals: /73   Pulse 81   Temp 98.6 °F (37 °C) (Axillary)   Resp 16   Ht 5' 10\" (1.778 m)   Wt 156 lb 9 oz (71 kg)   SpO2 93%   BMI 22.46 kg/m²     I/O:      Intake/Output Summary (Last 24 hours) at 1/30/2020 1526  Last data filed at 1/30/2020 1000  Gross per 24 hour   Intake 250 ml   Output 650 ml   Net -400 ml     No intake/output data recorded. I/O last 3 completed shifts:   In: 250 [P.O.:240; I.V.:10]  Out: 650 [Urine:650]    Physical Examination:   · General appearance: alert, appears stated age and cooperative  · Skin: Skin color, texture, turgor normal.   · HEENT: EOMI: Normocephalic, no lesions, without obvious abnormality. · Lungs: clear to auscultation bilaterally  · Heart: irregularly irregular rate and rhythm, S1, S2 normal, + systolic murmur, click, rub or gallop  · Abdomen: soft, non-tender; bowel sounds normal; no masses,  no organomegaly  · MSK: extremities normal, atraumatic, no cyanosis or edema  · Neurologic:  Mental status: Alert, oriented x2, thought content appropriate, cooperative  · Lines: PIV    DATA:       Labs:  CBC:   Recent Labs     01/28/20 1712 01/29/20 0632 01/30/20  0659   WBC 9.4 13.9* 11.0   HGB 13.6 13.1* 12.0*   HCT 40.8 39.9* 35.2*    263 252       BMP:   Recent Labs     01/28/20 1712 01/29/20  0632 01/30/20  0659    140 139   K 5.0 4.7 3.8    107 106   CO2 28 20* 23   BUN 13 11 9   CREATININE 0.9 0.6* 0.7*   GLUCOSE 330* 155* 109*     LFT's:   Recent Labs     01/28/20 1712 01/29/20  0632 01/30/20  0659   AST 38* 32 24   ALT 33 26 20   BILITOT 0.4 0.8 0.8   ALKPHOS 140* 122 114     Troponin:   Recent Labs     01/28/20 1712   TROPONINI <0.01     BNP: No results for input(s): BNP in the last 72 hours. ABGs: No results for input(s): PHART, EME3SUS, PO2ART in the last 72 hours. INR:   Recent Labs     01/28/20 1712 01/29/20  0632 01/30/20  0659   INR 3.58* 3.14* 2.48*     Lipids: No results for input(s): CHOL, HDL in the last 72 hours.     Invalid input(s): LDLCALCU    U/A:  Recent Labs     01/28/20  1734   COLORU Yellow   PHUR 6.0   WBCUA 3-5   RBCUA 3-5*   BACTERIA Rare*   CLARITYU Clear   SPECGRAV 1.025   LEUKOCYTESUR Negative   UROBILINOGEN 0.2   BILIRUBINUR Negative   BLOODU MODERATE*   GLUCOSEU 500*   AMORPHOUS 2+*        Micro: cultures NGTD    ASSESSMENT AND PLAN:   Kaela Davila is a 81 yo male with hx of HTN, HLD, dementia, aortic valve repair and Afib on coumadin who presents with altered mental status/agitation.     Acute metabolic encephalopathy   - unclear cause. Maybe 2/2 uncontrolled blood glucose and/or disrupted sleep cycle. Also on risperdal and namenda. UA unremarkable other than glucose. CXR w no PNA. CT head with no acute process.  Digoxin level normal. Electrolytes normal.  - continue risperdal for now  - continue namenda  - TSH, B12, folate wnl  - Hemoglobin A1c 8.6, confirmed DM2  - hyperglycemia imroved, managed as below  - PT/OT      Hyperglycemia 2/2 new dx of DM2  - A1c 8.6  - nightly lantus 10 U  - sliding scale low dose  - transition to metformin on discharge  - hypoglycemia protocol    Hematuria - resolved  - both gross and apparent on UA, urine cleared up now  - CT abd/pelvis showed nonobstructing nephrolithiasis  - Urology consulted, will be managed as outpatient (either ESWL if patient AC stopped, or stent)    Afib, rate controlled  - continue digoxin  - continue coumadin, INR>3, PTD coumadin     Stage II sacral wound  - wound care     Aortic valve repair  - continue coumadin  - ASA     HLD   - continue lipitor     Code Status:Full code  FEN: Cardiac  PPX: pepcid  DISPO: F, [facility] 8 and 12, likely DC to SNF tomorrow, awaiting precert       Discussed with attending physician Dr. Jacky Sainz    -----------------------------  Maninder Romero M.D. PGY-3, 3:26 PM

## 2020-01-30 NOTE — PROGRESS NOTES
Palliative Care Chart Review  and Check in Note:     NAME:  Dora Larry  Admit Date: 1/28/2020  Hospital Day:  Hospital Day: 3   Current Code status: DNR-CCA    Palliative care is continuing to following Mr. Stephanie Camp for symptom management,  and goals of care discussion as needed. Patient's chart reviewed today 1/30/20. Pt up to chair, eating lunch with family at the bedside. D/w [facility], family has decided that SNF for short term rehab will be best for pt. Will continue to follow. The following are the currently established goals/code status, and Symptom management.      Goals of care: Continue with current management      Code status: DNRCC-A      Discharge plan: SNF for short term rehab    [facility]  Inpatient Palliative Care  [phone]

## 2020-01-30 NOTE — PROGRESS NOTES
Physical Therapy  Facility/Department: [address]  Daily Treatment Note  NAME: Bri Viramontes  : 1932  MRN: [id number]    Date of Service: 2020    Discharge Recommendations: Bri Viramontes scored a 8/24 on the AM-PAC short mobility form. Current research shows that an AM-PAC score of 17 or less is typically not associated with a discharge to the patient's home setting. Based on the patients AM-PAC score and their current functional mobility deficits, it is recommended that the patient have 3-5 sessions per week of Physical Therapy at d/c to increase the patients independence. PT Equipment Recommendations  Equipment Needed: No    Assessment   Body structures, Functions, Activity limitations: Decreased functional mobility ; Decreased endurance;Decreased strength;Decreased balance;Decreased safe awareness; Increased pain  Assessment: Pt cont with functional mobility below baseline but participates in bed to chair transfer and short ambulation with RW today. Cont to require 2 person assist for safety with max cues for sequencing and initiation of all functional mobility. Family present today and states they understand and agree with pt attending rehab prior to returning home. Will cont IP PT to maximize functional mobility. Treatment Diagnosis: impaired gait and transfers  Prognosis: Good  Decision Making: Medium Complexity  PT Education: PT Role;Plan of Care; Functional Mobility Training;Transfer Training;Family Education;Home Exercise Program;Pressure Relief  Patient Education: would benefit from reinforcement  REQUIRES PT FOLLOW UP: Yes  Activity Tolerance  Activity Tolerance: Patient limited by fatigue;Patient limited by endurance; Patient limited by pain     Patient Diagnosis(es): The primary encounter diagnosis was Altered mental status, unspecified altered mental status type. A diagnosis of Hyperglycemia was also pertinent to this visit.      has a past medical history of Aortic stenosis, Arthritis, Chronic a-fib, Dementia ([facility] [address] Hyperlipidemia, Hypertension, Kidney stones, Pneumonia, and SOB (shortness of breath). has a past surgical history that includes Hemorrhoid surgery ([id number]); Ankle fracture surgery; Forearm fracture surgery; Kidney stone surgery; Tonsillectomy ([id number]); Testicle surgery; Aortic valve replacement (6/20/2012); and Colonoscopy. Restrictions  Position Activity Restriction  Other position/activity restrictions: Ambulate patient, up as tolerated   Subjective   General  Chart Reviewed: Yes  Additional Pertinent Hx: Admit 1/28 with AMS; head CT: (-) acute; Lspine CT: Compression deformities of the L3 and L4 vertebral, seen on previous imaging; abd/pelvic CT pending; PMHx: SOB, PNA, HTN, HLD, dementia, a-fib, arthritis, aortic stenosis, forearm fx surgery, aortic valve replacement, ankle fx surgery  Referring Practitioner: Christiano Angel MD  Subjective  Subjective: Pt found supine in bed with HOB elevated. Reports unrated discomfort in R ankle and L leg. RN aware. Orientation  Orientation  Overall Orientation Status: Within Functional Limits(oriented to month and year, \"[facility]\", self)  Cognition   Cognition  Overall Cognitive Status: Exceptions  Arousal/Alertness: Delayed responses to stimuli;Inconsistent responses to stimuli  Following Commands: Follows one step commands with increased time; Follows one step commands with repetition  Attention Span: Difficulty attending to directions  Memory: Decreased recall of recent events;Decreased recall of biographical Information;Decreased short term memory;Decreased long term memory  Safety Judgement: Decreased awareness of need for assistance  Problem Solving: Able to problem solve independently;Decreased awareness of errors  Insights: Decreased awareness of deficits  Initiation: Requires cues for all  Sequencing: Requires cues for some  Objective   Bed mobility  Supine to Sit: Dependent/Total(Mod A x 2 for Training, Functional Mobility Training, Transfer Training, Home Exercise Program  Safety Devices  Type of devices: Chair alarm in place, Nurse notified, Gait belt, Call light within reach, Left in chair     Therapy Time   Individual Concurrent Group Co-treatment   Time In 1340         Time Out 1412         Minutes 32           Timed Code Treatment Minutes: 32    Total Treatment Minutes: 32    If patient is discharged prior to next treatment, this not will serve as the discharge summary. Salo Aries, [facility]    Therapist was present, directed the patient's care, made skilled judgement and was responsible for assessment and treatment of the patient.      Fadi Jaime, PT, DPT [id number]

## 2020-01-30 NOTE — CARE COORDINATION
[address] Transitions Interview     2020    Patient: Shar Larkin Patient : 1932   MRN: [id number]   Reason for Admission: Acute Metabolic Encephalopathy  RARS: Readmission Risk Score: 25         Spoke with: Shra Larkin      Readmission Risk  Patient Active Problem List   Diagnosis    Pure hypercholesterolemia    Aortic valve disorder    Impaired fasting glucose    Vitamin D deficiency    Osteoporosis    Calculus of kidney    Testosterone deficiency    Cataract    Lumbar facet arthropathy    Lumbar compression fracture (HCC)    Hearing loss    Memory loss    BPH (benign prostatic hyperplasia)    Poor dentition    Delusions (HCC)    S/P AVR    Late onset Alzheimer's disease without behavioral disturbance (HCC)    Idiopathic hypotension    Acute cystitis with hematuria    Excoriation of groin    Acute metabolic encephalopathy       Inpatient Assessment  Care Transitions Summary    Care Transitions Inpatient Review  Medication Review  Housing Review  Who do you live with?:  Partner/Spouse/SO  Are you an active caregiver in your home?:  No  Social Support  Do you have a ?:  Yes   Name:  unknown   Do you have a Home Health Coordinator?:  Yes  Zenia [address] Name:  [address] [place] [address] Equipment  Patient DME:  Honey Sierra  Functional Review  Ability to seek help/take action for Emergent/Urgent situations i.e. fire, crime, inclement weather or health crisis. :  Dependent  Ability handle personal hygiene needs (bathing/dressing/grooming):  Dependent  Ability to manage medications:  Dependent  Ability to prepare food:  Dependent  Ability to maintain home (clean home, laundry):  Dependent  Ability to drive and/or has transportation:  Dependent  Ability to do shopping:  Dependent  Ability to manage finances:  Dependent  Is patient able to live independently?:  No  Hearing and Vision  Hearing Impairment:  Hard of hearing  Care Transitions

## 2020-01-30 NOTE — PROGRESS NOTES
Pt refused midnight vitals. Also pulled of tele multiple times and now refuses to have it put back on. Resident on call made aware. Will continue to follow pt.

## 2020-01-30 NOTE — CARE COORDINATION
Case Management Assessment           Initial Evaluation                Date / Time of Evaluation: 1/29/2020 8:12 PM                 Assessment Completed by: Sandy Yang Day    Patient Name: Shar Larkin     YOB: 1932  Diagnosis: Acute metabolic encephalopathy [A51.03]  Acute metabolic encephalopathy [H06.32]     Date / Time: 1/28/2020  4:38 PM    Patient Admission Status: Inpatient    If patient is discharged prior to next notation, then this note serves as note for discharge by case management. Current PCP: Deidre Cueva MD  Clinic Patient: No    Chart Reviewed: Yes  Patient/ Family Interviewed: Yes    Initial assessment completed at bedside with: Patient     Hospitalization in the last 30 days: No    Emergency Contacts:  Extended Emergency Contact Information  Primary Emergency Contact: GlenroyAnalilia  Address: [address] [place] [address] Phone: [phone]  Relation: Spouse  Secondary Emergency Contact: [address] [place] [address] Phone: [phone]  Relation: Brother/Sister    Advance Directives:   Code Status: [address]: No      Financial:  Payor: Cedrick Sherman / Plan: Ceasar Saleh ESSENTIAL/PLUS / Product Type: *No Product type* /     Pre-cert required for SNF: Yes    Pharmacy:    [facility] [address], [address] [facility]  [address] [place] [address]  Phone: [phone] Fax: [phone]      Potential assistance Purchasing Medications: Potential Assistance Purchasing Medications: No  Does Patient want to participate in local refill/ meds to beds program?: No    Meds To Beds General Rules:  1. Can ONLY be done Monday- Friday between 8:30am-5pm  2. Prescription(s) must be in pharmacy by 3pm to be filled same day  3. Copy of patient's insurance/ prescription drug card and patient face sheet must be sent along with the prescription(s)  4. Cost of Rx cannot be added to hospital bill. If financial assistance is needed, please contact unit  or ;  or  CANNOT provide pharmacy voucher for patients co-pays  5.  Patients can then  the prescription on their way out of the hospital at discharge, or pharmacy can deliver to the bedside if staff is available. (payment due at time of pick-up or delivery - cash, check, or card accepted)     Able to afford home medications/ co-pay costs: Yes    ADLS:  Support Systems: Spouse/Significant Other    PT AM-PAC:   OT AM-PAC:     Housing:  Lives With: Spouse  Type of Home: [address] [place] [address] Drive: One level  Home Access: Stairs to enter without rails  Entrance Stairs - Number of Steps: 1  Bathroom Shower/Tub: Walk-in shower  Bathroom Toilet: Standard  Bathroom Equipment: Grab bars in [address] [place]: Cane, Rolling walker  ADL Assistance: Needs assistance(Wife helps with dressing)  Homemaking Assistance: Needs assistance  Ambulation Assistance: Needs assistance(with rollator at times; pt reports needing 1-2 person assist recently)  Transfer Assistance: Needs assistance(needs assist recently)  Active : No  Occupation: Retired  Additional Comments: Pt reports no falls recently, but pt questionable historian    Plans to RETURN to current housing: Yes  Barrier(s) to RETURNING to current housin [address]:  Currently ACTIVE with  [address]: Yes  [address]: [facility] [place]  Phone: [phone]  Fax: N/A     Currently ACTIVE with [place] on Aging: No      Durable Medical Equipment:  [facility] Provider: [facility]  Equipment: Transport Chair, Kim Post,     Home Oxygen and Respiratory Equipment:  Has  [facility] prior to admission: No  Ed[address]: Not Applicable    Dialysis:  Active with HD/PD prior to admission: No    DISCHARGE PLAN:  [facility] [place]. Transportation PLAN for discharge: EMS transportation     Factors facilitating achievement of predicted outcomes: Family support, Caregiver support, Friend support, Cooperative and Has needed Durable Medical Equipment at home    Barriers to discharge: Long standing deficits and Medical complications    Additional Case Management Notes:   SW met with patient and spouse at bedside. Spouses sister was also present. Patient resides at home and has aides 2-3 per morning and evening daily through [facility]. Patient receives services through the [place] [facility] at [place]. RN to contact is Baljit Orellana at [phone]. She can authorize home care services for patient. Wife was thinking about SNF but leaning towards home with home health. SW to help with additonal supports. Will need assistance with transport. SW provided contact information to patient/family and placed information on white board in room should needs arise. No other needs noted at this time. The Plan for Transition of Care is related to the following treatment goals   Acute metabolic encephalopathy C30.63         The Patient and/or patient representative Patient was provided with a choice of provider and agrees with the discharge plan YEs  Freedom of choice list was provided with basic dialogue that supports the patient's individualized plan of care/goals and shares the quality data associated with the providers.  Yes    Care Transition patient: No    CHARLI Mueller  [facility] [employer]  Case Management Department  Ph: [phone]   Fax: [phone]

## 2020-01-30 NOTE — PLAN OF CARE
Problem: Risk for Impaired Skin Integrity  Goal: Tissue integrity - skin and mucous membranes  Description  Structural intactness and normal physiological function of skin and  mucous membranes. Outcome: Ongoing  Note:   Pt was admitted with a stage 2 on the buttocks and skin tear on the LLE. Patient's skin has been kept clean and dry, venelex applied and no brief in place. Patient was placed on specialty low air loss mattress. Pt repositioned q2h. Will continue to monitor. Problem: Confusion - Acute:  Goal: Mental status will be restored to baseline  Description  Mental status will be restored to baseline  Outcome: Ongoing  Note:   Throughout the shift the patient became more alert and interactive. Pt is responding to questions and making jokes. He maintains orientation to self only. Will continue to monitor.

## 2020-01-30 NOTE — PROGRESS NOTES
PharmD, [address], [facility] or [phone] (wireless)  1/30/2020 8:46 AM

## 2020-01-30 NOTE — PROGRESS NOTES
SBA to prepare for ADL participation - not met   Short term goal 2: Tolerate 3 min static stance with CGA - Not met  Short term goal 3: complete BSC transfer with Mod A - not met   Short term goal 4: complete sit to/from transfer x5 with Mod A in preparation for functional mobility - not met   Patient Goals   Patient goals : to go home        Therapy Time   Individual Concurrent Group Co-treatment   Time In 1319         Time Out 1413         Minutes 54         Timed Code Tx Min:54  Total Tx Time:54    Therapist was present, directed the patient's care, made skilled judgment, and was responsible for assessment and treatment of the patient.       Guido Marshall, s/OT   (Romeo Brice, OTR/L, [id number])

## 2020-01-31 LAB
A/G RATIO: 1.3 (ref 1.1–2.2)
ALBUMIN SERPL-MCNC: 3.5 G/DL (ref 3.4–5)
ALP BLD-CCNC: 127 U/L (ref 40–129)
ALT SERPL-CCNC: 24 U/L (ref 10–40)
ANION GAP SERPL CALCULATED.3IONS-SCNC: 13 MMOL/L (ref 3–16)
AST SERPL-CCNC: 33 U/L (ref 15–37)
BASOPHILS ABSOLUTE: 0 K/UL (ref 0–0.2)
BASOPHILS RELATIVE PERCENT: 0.3 %
BILIRUB SERPL-MCNC: 0.9 MG/DL (ref 0–1)
BUN BLDV-MCNC: 8 MG/DL (ref 7–20)
CALCIUM SERPL-MCNC: 8.6 MG/DL (ref 8.3–10.6)
CHLORIDE BLD-SCNC: 103 MMOL/L (ref 99–110)
CO2: 23 MMOL/L (ref 21–32)
CREAT SERPL-MCNC: 0.7 MG/DL (ref 0.8–1.3)
EOSINOPHILS ABSOLUTE: 0.3 K/UL (ref 0–0.6)
EOSINOPHILS RELATIVE PERCENT: 2.8 %
GFR AFRICAN AMERICAN: >60
GFR NON-AFRICAN AMERICAN: >60
GLOBULIN: 2.8 G/DL
GLUCOSE BLD-MCNC: 121 MG/DL (ref 70–99)
GLUCOSE BLD-MCNC: 133 MG/DL (ref 70–99)
GLUCOSE BLD-MCNC: 145 MG/DL (ref 70–99)
GLUCOSE BLD-MCNC: 233 MG/DL (ref 70–99)
GLUCOSE BLD-MCNC: 288 MG/DL (ref 70–99)
HCT VFR BLD CALC: 38.9 % (ref 40.5–52.5)
HEMOGLOBIN: 12.9 G/DL (ref 13.5–17.5)
INR BLD: 2.93 (ref 0.86–1.14)
LYMPHOCYTES ABSOLUTE: 0.8 K/UL (ref 1–5.1)
LYMPHOCYTES RELATIVE PERCENT: 6.5 %
MCH RBC QN AUTO: 33.2 PG (ref 26–34)
MCHC RBC AUTO-ENTMCNC: 33.2 G/DL (ref 31–36)
MCV RBC AUTO: 100.1 FL (ref 80–100)
MONOCYTES ABSOLUTE: 1.3 K/UL (ref 0–1.3)
MONOCYTES RELATIVE PERCENT: 10.6 %
NEUTROPHILS ABSOLUTE: 9.6 K/UL (ref 1.7–7.7)
NEUTROPHILS RELATIVE PERCENT: 79.8 %
PDW BLD-RTO: 13.3 % (ref 12.4–15.4)
PERFORMED ON: ABNORMAL
PLATELET # BLD: 253 K/UL (ref 135–450)
PMV BLD AUTO: 9.2 FL (ref 5–10.5)
POTASSIUM REFLEX MAGNESIUM: 3.8 MMOL/L (ref 3.5–5.1)
PROTHROMBIN TIME: 34.3 SEC (ref 10–13.2)
RBC # BLD: 3.88 M/UL (ref 4.2–5.9)
SODIUM BLD-SCNC: 139 MMOL/L (ref 136–145)
TOTAL PROTEIN: 6.3 G/DL (ref 6.4–8.2)
WBC # BLD: 12.1 K/UL (ref 4–11)

## 2020-01-31 PROCEDURE — 1200000000 HC SEMI PRIVATE

## 2020-01-31 PROCEDURE — 6370000000 HC RX 637 (ALT 250 FOR IP): Performed by: STUDENT IN AN ORGANIZED HEALTH CARE EDUCATION/TRAINING PROGRAM

## 2020-01-31 PROCEDURE — 97530 THERAPEUTIC ACTIVITIES: CPT

## 2020-01-31 PROCEDURE — 2580000003 HC RX 258: Performed by: STUDENT IN AN ORGANIZED HEALTH CARE EDUCATION/TRAINING PROGRAM

## 2020-01-31 PROCEDURE — 87040 BLOOD CULTURE FOR BACTERIA: CPT

## 2020-01-31 PROCEDURE — 2580000003 HC RX 258: Performed by: INTERNAL MEDICINE

## 2020-01-31 PROCEDURE — 99221 1ST HOSP IP/OBS SF/LOW 40: CPT | Performed by: PSYCHIATRY & NEUROLOGY

## 2020-01-31 PROCEDURE — 36415 COLL VENOUS BLD VENIPUNCTURE: CPT

## 2020-01-31 PROCEDURE — 85610 PROTHROMBIN TIME: CPT

## 2020-01-31 PROCEDURE — 85025 COMPLETE CBC W/AUTO DIFF WBC: CPT

## 2020-01-31 PROCEDURE — 80053 COMPREHEN METABOLIC PANEL: CPT

## 2020-01-31 PROCEDURE — 99232 SBSQ HOSP IP/OBS MODERATE 35: CPT | Performed by: INTERNAL MEDICINE

## 2020-01-31 PROCEDURE — 97110 THERAPEUTIC EXERCISES: CPT

## 2020-01-31 PROCEDURE — 97535 SELF CARE MNGMENT TRAINING: CPT

## 2020-01-31 PROCEDURE — 97116 GAIT TRAINING THERAPY: CPT

## 2020-01-31 RX ORDER — WARFARIN SODIUM 2.5 MG/1
2.5 TABLET ORAL DAILY
Status: DISCONTINUED | OUTPATIENT
Start: 2020-02-01 | End: 2020-02-01

## 2020-01-31 RX ORDER — RISPERIDONE 0.25 MG/1
0.5 TABLET, FILM COATED ORAL NIGHTLY
Status: DISCONTINUED | OUTPATIENT
Start: 2020-02-01 | End: 2020-02-01 | Stop reason: HOSPADM

## 2020-01-31 RX ORDER — WARFARIN SODIUM 1 MG/1
1 TABLET ORAL
Status: COMPLETED | OUTPATIENT
Start: 2020-01-31 | End: 2020-01-31

## 2020-01-31 RX ORDER — SODIUM CHLORIDE 9 MG/ML
INJECTION, SOLUTION INTRAVENOUS CONTINUOUS
Status: ACTIVE | OUTPATIENT
Start: 2020-01-31 | End: 2020-02-01

## 2020-01-31 RX ADMIN — INSULIN GLARGINE 10 UNITS: 100 INJECTION, SOLUTION SUBCUTANEOUS at 20:24

## 2020-01-31 RX ADMIN — DIGOXIN 125 MCG: 125 TABLET ORAL at 10:30

## 2020-01-31 RX ADMIN — MELATONIN 2000 UNITS: at 10:29

## 2020-01-31 RX ADMIN — Medication 10 ML: at 10:30

## 2020-01-31 RX ADMIN — SODIUM CHLORIDE: 9 INJECTION, SOLUTION INTRAVENOUS at 18:20

## 2020-01-31 RX ADMIN — CASTOR OIL AND BALSAM, PERU: 788; 87 OINTMENT TOPICAL at 10:30

## 2020-01-31 RX ADMIN — MEMANTINE HYDROCHLORIDE 5 MG: 5 TABLET ORAL at 10:30

## 2020-01-31 RX ADMIN — ASPIRIN 81 MG: 81 TABLET, COATED ORAL at 10:29

## 2020-01-31 RX ADMIN — RISPERIDONE 0.5 MG: 0.5 TABLET ORAL at 10:29

## 2020-01-31 RX ADMIN — FAMOTIDINE 20 MG: 20 TABLET, FILM COATED ORAL at 10:29

## 2020-01-31 RX ADMIN — INSULIN LISPRO 3 UNITS: 100 INJECTION, SOLUTION INTRAVENOUS; SUBCUTANEOUS at 18:20

## 2020-01-31 RX ADMIN — CASTOR OIL AND BALSAM, PERU: 788; 87 OINTMENT TOPICAL at 20:32

## 2020-01-31 RX ADMIN — ATORVASTATIN CALCIUM 40 MG: 40 TABLET, FILM COATED ORAL at 10:30

## 2020-01-31 RX ADMIN — WARFARIN SODIUM 1 MG: 1 TABLET ORAL at 18:43

## 2020-01-31 RX ADMIN — TAMSULOSIN HYDROCHLORIDE 0.4 MG: 0.4 CAPSULE ORAL at 10:30

## 2020-01-31 RX ADMIN — FAMOTIDINE 20 MG: 20 TABLET, FILM COATED ORAL at 21:00

## 2020-01-31 RX ADMIN — INSULIN LISPRO 1 UNITS: 100 INJECTION, SOLUTION INTRAVENOUS; SUBCUTANEOUS at 20:25

## 2020-01-31 ASSESSMENT — PAIN SCALES - GENERAL: PAINLEVEL_OUTOF10: 0

## 2020-01-31 ASSESSMENT — PAIN SCALES - WONG BAKER
WONGBAKER_NUMERICALRESPONSE: 0
WONGBAKER_NUMERICALRESPONSE: 0

## 2020-01-31 NOTE — CARE COORDINATION
Case Management Assessment            Discharge Note                    Date / Time of Note: 1/31/2020 3:34 PM                  Discharge Note Completed by: Bart Johansen    Patient Name: Deep Montanez   YOB: 1932  Diagnosis: Acute metabolic encephalopathy [E27.05]  Acute metabolic encephalopathy [U40.45]   Date / Time: 1/28/2020  4:38 PM    Current PCP: Annabel Kumar MD  Clinic patient: No    Hospitalization in the last 30 days: No    Advance Directives:  Code Status: DNR-CCA  [facility] DNR form completed and on chart: Not Indicated    Financial:  Payor: Dez Rossi / Plan: Keri Goodson ESSENTIAL/PLUS / Product Type: *No Product type* /      Pharmacy:    [facility] [address], [address]  Phone: [phone] Fax: [phone]      Assistance purchasing medications?: Potential Assistance Purchasing Medications: No  Assistance provided by Case Management: None at this time    Does patient want to participate in local refill/ meds to beds program?: No    Meds To Beds General Rules:  1. Can ONLY be done Monday- Friday between 8:30am-5pm  2. Prescription(s) must be in pharmacy by 3pm to be filled same day  3. Copy of patient's insurance/ prescription drug card and patient face sheet must be sent along with the prescription(s)  4. Cost of Rx cannot be added to hospital bill. If financial assistance is needed, please contact unit  or ;  or  CANNOT provide pharmacy voucher for patients co-pays  5.  Patients can then  the prescription on their way out of the hospital at discharge, or pharmacy can deliver to the bedside if staff is available. (payment due at time of pick-up or delivery - cash, check, or card accepted)     Able to afford home medications/ co-pay costs: Yes    ADLS:  Current PT AM-PAC Score: 8 /24  Current OT AM-PAC Score: 12 /24      DISCHARGE Disposition: Campos Asaf (SNF): [facility]  Phone: [phone] Fax: [phone]    LOC at discharge: Not Applicable  EL Completed: No    Notification completed in HENS/PAS?:  Yes : CM has completed HENS online through secure website for SNF admission at [facility] 1/31. Document ID #: [id number]      IMM Completed:   Not Indicated    Transportation:  Transportation PLAN for discharge: EMS transportation   Mode of Transport: Ambulance stretcher - BLS  Reason for medical transport: pt has hx of dementia, max 2 assist, also gets agitated. Name of [address]: [address]  Phone: [phone]  Time of Transport: 2/1/2020 @ 2 pm     Transport form completed: No    Home Care:  [address] ordered at discharge: No  [address]: Not Applicable  Orders faxed: No    Durable Medical Equipment:  DME Provider: N/A  Equipment obtained during hospitalization: N/A    Home Oxygen and Respiratory Equipment:  Oxygen needed at discharge?: No  [address]: Not Applicable  Portable tank available for discharge?: No    Dialysis:  Dialysis patient: No    Dialysis Center:  Not Applicable    Hospice Services:  Location: Not Applicable  Agency: Not Applicable    Consents signed: Not Indicated    Referrals made at [facility] for outpatient continued care:  Not Applicable    Additional CM Notes: [facility] not able to accept pt. Pt's family spoke with Jignesh Albert from admissions at [facility] and facility is willing to accept pt. Family has given student verbal agreement to follow through with placement at [facility]. Student called facility to confirm plans and facility said they can accept whenever, they just need orders when they have been completed. Student has scheduled stretcher transport for 2/1/20 @ 2 pm.      Student called family to notify about the transport and left voicemail.        The Plan for Transition of

## 2020-01-31 NOTE — PROGRESS NOTES
Clinical Pharmacy Progress Note    Admit date: 1/28/2020     Subjective/Objective:  Pt is a 80yom with PMHx that includes HTN, HLD, dementia, A.fib, and aortic stenosis s/p porcine AVR who is admitted with acute metabolic encephalopathy. Pt also found to have Rt 9mm UPJ stone. Hematuria now cleared. Planning for outpatient management of nephrolithiasis per notes. Interval update: Will likely go to SNF at discharge. Pharmacy is consulted to dose Warfarin per Suly Mcmanus & Joshua  Target INR = 2.0-3.0 for h/o A.fib and AVR  Home dose  = 2.5mg daily except 5mg on Sundays  · Warfarin is managed by [facility] anticoagulation clinic  · Last INR 1/10/20 = 2.1    Current anticoagulation regimen:  Warfarin - Pharmacy to dose    Date INR Warfarin   1/28 3.58 --   1/29 3.14 2.5mg   1/30 2.48 2.5mg   1/31 2.93             Recent Labs     01/30/20  0659 01/31/20  0634    139   K 3.8 3.8    103   CO2 23 23   BUN 9 8   CREATININE 0.7* 0.7*   GLUCOSE 109* 133*       Estimated Creatinine Clearance: 75 mL/min (A) (based on SCr of 0.7 mg/dL (L)). Lab Results   Component Value Date    WBC 12.1 (H) 01/31/2020    HGB 12.9 (L) 01/31/2020    HCT 38.9 (L) 01/31/2020    .1 (H) 01/31/2020     01/31/2020       Lab Results   Component Value Date    PROTIME 34.3 (H) 01/31/2020    INR 2.93 (H) 01/31/2020       Height:  5' 10\" (177.8 cm)  Weight:  156 lb 9 oz (71 kg)    Microbiology:  Urine (1/28) = Strep viridans, no further work-up (normal  beau)    Prophylaxis:  VTE:  Warfarin  GI:  Famotidine    Vaccination screening:  Pneumonia:  Up to date  Influenza:  Up to date    Assessment/Plan:  1)  H/o A.fib and AVR, on Warfarin:  Pharmacy to dose, target INR = 2.0-3.0  · INR today = 2.93  · Given elevated INR on admission and jump in INR again today from 2.48-->2.93 despite holding dose 1/28, will give lower dose of Warfarin 1mg x1 today. Will likely start lower dose of 2.5mg daily tomorrow if INR is stable.   · Daily INR will be monitored closely and dose adjustments will be made as appropriate.     Please call with questions--  Thanks--  Trevor Weinstein, PharmD, [address] [facility], [address] [facility] or [phone] (wireless)  1/31/2020 9:58 AM

## 2020-01-31 NOTE — CARE COORDINATION
Case Management Assessment           Daily Note                 Date/ Time of Note: 1/31/2020 8:59 AM         Note completed by: George Perez    Patient Name: Daphne Bhardwaj  YOB: 1932    Diagnosis:Acute metabolic encephalopathy [C27.07]  Acute metabolic encephalopathy [V37.99]  Patient Admission Status: Inpatient    Date of Admission:1/28/2020  4:38 PM Length of Stay: 3 GLOS:        Current Plan of Care: d/c to SNF ([facility])   ________________________________________________________________________________________  PT AM-PAC: 8 / 24 per last evaluation on:     OT AM-PAC: 12 / 24 per last evaluation on:    DME Needs for discharge: Not indicated at this time. ________________________________________________________________________________________  Discharge Plan: SNF: ([facility])    Tentative discharge date: tentative weekend d/c     Current barriers to discharge: medical clearance     Referrals completed: SNF refferals to three different facilities     Resources/ information provided: SNF List  ________________________________________________________________________________________  Case Management Notes:    Student spoke with Gunner Gilbert from [facility] this morning regarding acceptance for pt. Gunner Gilbert states that she would like to come out to see the pt and speak with family first. Student told Gunner Gilbert that the pt's family would greatly appreciate this and they are typically at the hospital around noon. Gunner Gilbert from admissions said that noon would work with their schedule. Student called and spoke with pt's sister in law, Elham Sheets, as well as pt's wife to inform them of this. Family told student that they were not planning on coming to the hospital today but they will see if they can make arrangements to come around noon time. Pt's wife told student that she will call her back with a definite answer.      Also,

## 2020-01-31 NOTE — CONSULTS
PSYCHIATRY CONSULT, INITIAL EVALUATION    Jesus Rehman [id number]     Date/time of admission: 1/28/2020  4:38 PM    CC/Reason for Consult: delirum    HPI: 80 y.o. male with h/o dementia who is admitted to medicine for AMS. Pt was brought from home where family noticed waxing and waning personality changes - agitation, verbal aggression. Pt seen at bedside and he is unfortunately unable to keep his eyes open to have a conversation. Spoke with nursing who said he slept all night and was very drowsy this morning, even before his AM Risperdal dose. Reports his agitation was improved yesterday afternoon. He typically is agitated when nursing tries to move him. Only oriented to self recently. No RTIS. Slept well last night. Family is OK with him going to SNF. context: medical hospitalization  severity: moderate  location: AMS / mood disturbance  associated symptoms: see above  modifiers: course of illness, stressors  duration: acute/subacute/chronic     History obtained from patient and/or chart. Past Psychiatric History:    Prior hospitalizations: LISA, none per chart   Prior diagnoses: Dementia   Prior medication trials: Reviewed in EHR   Outpatient Treatment: PCP   Suicide Attempts: LISA, none per chart      Substance Use History:   Nicotine:    Alcohol: None   Illicits: none   Caffeine:    Past Medical History:   Past Medical History:   Diagnosis Date    Aortic stenosis     Arthritis     Chronic a-fib     Dementia ([address]     Hyperlipidemia     Hypertension     Kidney stones     Pneumonia     distant past    SOB (shortness of breath)      Past Surgical History:   Procedure Laterality Date    ANKLE FRACTURE SURGERY      AORTIC VALVE REPLACEMENT  6/20/2012    DR. Chand - porcine     COLONOSCOPY      FOREARM FRACTURE SURGERY      HEMORRHOID SURGERY  [id number]    KIDNEY STONE SURGERY      TESTICLE SURGERY      fluid removed    TONSILLECTOMY  [id number]    adenoidectomy       Social/Developmental Daily    Venelex   Topical BID    aspirin  81 mg Oral Daily    atorvastatin  40 mg Oral Daily    Vitamin D  2,000 Units Oral Daily    digoxin  125 mcg Oral Daily    memantine  5 mg Oral Daily    mirabegron  25 mg Oral Daily    risperiDONE  0.5 mg Oral Daily    tamsulosin  0.4 mg Oral Daily    insulin glargine  10 Units Subcutaneous Nightly    insulin lispro  0-6 Units Subcutaneous TID WC    insulin lispro  0-3 Units Subcutaneous Nightly    sodium chloride flush  10 mL Intravenous 2 times per day    famotidine  20 mg Oral BID     Continuous Infusions:   dextrose       PRN Meds:.glucose, dextrose, glucagon (rDNA), dextrose, sodium chloride flush, magnesium hydroxide, ondansetron, acetaminophen    ROS: Denies trouble with fever, rash, headache, vision changes, chest pain, shortness of breath, nausea, extremity pain, weakness, dysuria. OBJECTIVE:  Vitals: Sarah Anand Vitals:    01/30/20 1545 01/30/20 1945 01/31/20 0345 01/31/20 1015   BP: (!) 96/56 122/81 135/80 138/82   Pulse: 69 78 77 64   Resp: 16 16 18 18   Temp: 97.1 °F (36.2 °C) 97.5 °F (36.4 °C) 97.8 °F (36.6 °C) 98.4 °F (36.9 °C)   TempSrc: Oral Oral Oral Axillary   SpO2: 99% 99% 99% 97%   Weight:       Height:         Body mass index is 22.46 kg/m².     Mental Status Exam:   Appearance: appears stated age, poor eye contact  Behavior/Movement/Muscle Tone: no PMR/PMA, no abnormal movements appreciated, anti-gravity  Gait/station: not tested  Speech: LISA  Mood/Affect: Lethargic, LISA  Thought Process: LISA  Thought Content: LISA, no RTIS  Orientation: Not alert  Fund Of Knowledge: [facility]  Memory: [facility]  Insight/Judgment: poor/poor    Labs:   Complete Blood Count:  Recent Labs     01/29/20  0632 01/30/20  0659 01/31/20  0634   WBC 13.9* 11.0 12.1*   HGB 13.1* 12.0* 12.9*   HCT 39.9* 35.2* 38.9*   .0 98.2 100.1*    252 253       Basic Metabolic Panel:  Recent Labs     01/29/20  0632 01/30/20  0659 01/31/20  0634    139 139   K 4.7 3.8 3.8   CL 107 106 103   CO2 20* 23 23   BUN 11 9 8   MG 1.80  --   --        Hepatic Panel:  Recent Labs     01/29/20  0632 01/30/20  0659 01/31/20  0634   AST 32 24 33   ALT 26 20 24        Lab Results   Component Value Date    COLORU Yellow 01/28/2020    NITRU Negative 01/28/2020    GLUCOSEU 500 01/28/2020    KETUA Negative 01/28/2020    UROBILINOGEN 0.2 01/28/2020    BILIRUBINUR Negative 01/28/2020    BILIRUBINUR neg 07/03/2019         Imaging: [facility] 1/28/20   Stable noncontrast CT the brain without acute hemorrhage, edema or hydrocephalus.        Stable cerebral atrophy with left maxillary sinus disease, slightly improved compared to prior study         Axis I  Dementia NOS    Axis II: No diagnosis     Axis III       Diagnosis Date    Aortic stenosis     Arthritis     Chronic a-fib     Dementia ([address]     Hyperlipidemia     Hypertension     Kidney stones     Pneumonia     distant past    SOB (shortness of breath)       Active Problems:    Acute metabolic encephalopathy  Resolved Problems:    * No resolved hospital problems. *       Axis IV  Problems with primary support group and Problems related to the social environment      ASSESSMENT AND RECOMMENDATIONS: 80 y.o. male admitted to medicine service on 1/28/2020 for AMS    1. Psych: pattern of waxing/waning agitation c/w delirium, may be improving with better BG control. It's possibly a minor decompensation of his dementia, however not enough to qualify for inpatient psych. Would recommend switching his Risperdal to HS and continuing Namenda. He should be seen by a NH psychiatrist.  -Change Risperdal to 0.5mg qHS  -Continue Namenda  -Agitation has not risen to the level of requiring medication interventions.   -Have SNF arrange NH psychiatry visit  -Psychiatrically cleared. Does not require inpatient psychiatry, sitter, or hold. Appropriate for outpatient level of care. Safety plan includes: 911, PES, hotlines, and interventions discussed today.      2. Agitation recs: Patient is high risk for agitation, in the event of which we would recommend considering the following:   -recommend 1:1 observation: No   -prn med options: see above    3. Delirium: Patient is high risk for delirium.   -Nonpharmacologic evidence-based delirium precautions include the following: frequently reorient, shades up during day/down at night, TV off except for soft music only, have familiar objects at bedside, encourage friends/family to spend the night, minimize anticholingeric medications, minimize polypharmacy, minimize restraints (includes lines and/or foleys and/or feeding tubes as able) and minimize overnight checks/vitals to encourage restful consistent sleep patterns    4. Substance Abuse/Dep/Withdrawal: no active issues    5. Safety: does not require admission to inpatient psychiatry     Thank you for this consult, please call us with any further questions. I will not continue to follow at this time.     Shelby Aleman   Psychiatrist

## 2020-01-31 NOTE — PROGRESS NOTES
Progress Note  PGY-3    Hospital Day: 4                                                           Code:DNR-CCA  Admit Date: 1/28/2020                                             PCP: Serena Khan MD                                SUBJECTIVE:     Chief Complaint   Patient presents with    Altered Mental Status       Interval Hx:    No acute events overnight, refusing tele. Glucose stable. Patient has no acute complaints at this time, denies n/v/d, SOB, cough, dysuria. MEDICATIONS:   Scheduled Meds:   warfarin  1 mg Oral Once    [START ON 2/1/2020] warfarin  2.5 mg Oral Daily    Venelex   Topical BID    aspirin  81 mg Oral Daily    atorvastatin  40 mg Oral Daily    Vitamin D  2,000 Units Oral Daily    digoxin  125 mcg Oral Daily    memantine  5 mg Oral Daily    mirabegron  25 mg Oral Daily    risperiDONE  0.5 mg Oral Daily    tamsulosin  0.4 mg Oral Daily    insulin glargine  10 Units Subcutaneous Nightly    insulin lispro  0-6 Units Subcutaneous TID WC    insulin lispro  0-3 Units Subcutaneous Nightly    sodium chloride flush  10 mL Intravenous 2 times per day    famotidine  20 mg Oral BID      Continuous Infusions:   dextrose       PRN Meds:glucose, dextrose, glucagon (rDNA), dextrose, sodium chloride flush, magnesium hydroxide, ondansetron, acetaminophen    Allergies: No Known Allergies    PHYSICAL EXAM:       Vitals: /82   Pulse 64   Temp 98.4 °F (36.9 °C) (Axillary)   Resp 18   Ht 5' 10\" (1.778 m)   Wt 156 lb 9 oz (71 kg)   SpO2 97%   BMI 22.46 kg/m²     I/O:      Intake/Output Summary (Last 24 hours) at 1/31/2020 1051  Last data filed at 1/31/2020 0546  Gross per 24 hour   Intake --   Output 1425 ml   Net -1425 ml     No intake/output data recorded. I/O last 3 completed shifts:   In: 240 [P.O.:240]  Out: 1525 [Urine:1525]    Physical Examination:   · General appearance: alert, appears stated age and cooperative  · Skin: Skin color, texture, turgor normal.   · HEENT: and/or disrupted sleep cycle. Also on risperdal and namenda. UA unremarkable other than glucose. CXR w no PNA. CT head with no acute process.  Digoxin level normal. Electrolytes normal.  - continue risperdal for now  - continue namenda  - TSH, B12, folate wnl  - Hemoglobin A1c 8.6, confirmed DM2  - hyperglycemia imroved, managed as below  - PT/OT      Hyperglycemia 2/2 new dx of DM2  - A1c 8.6  - nightly lantus 10 U  - sliding scale low dose  - transition to metformin on discharge  - hypoglycemia protocol    Hematuria - resolved  - both gross and apparent on UA, urine cleared up now  - CT abd/pelvis showed nonobstructing nephrolithiasis  - Urology consulted, will be managed as outpatient (either ESWL if patient AC stopped, or stent)    Afib, rate controlled  - continue digoxin  - continue coumadin, INR>3, PTD coumadin     Stage II sacral wound  - wound care     Aortic valve repair  - continue coumadin  - ASA     HLD   - continue lipitor     Code Status:Full code  FEN: Cardiac  PPX: pepcid  DISPO: [facility], [facility] 8 and 15, likely DC to [facility] today       Discussed with attending physician Dr. Victoria Mcbride    -----------------------------  Oneida Trimble M.D. PGY-3, 10:51 AM

## 2020-01-31 NOTE — PROGRESS NOTES
Restrictions  Position Activity Restriction  Other position/activity restrictions: Ambulate patient, up as tolerated   Subjective   General  Chart Reviewed: Yes  Additional Pertinent Hx: 80 y.o. M admitted 1/28 for acute AMS and delerium. CT head shows no acute findings. PMHx: SOB, pneumonia, kidney stones, HTN, HLP, dementia, chronic a-fib, arthritis, aortic stenosis,  testicle surgery, kidney stone surgery, colonoscopy, aortic valve replacement, ankle fracture surgery. Family / Caregiver Present: Yes(Wife and daughter present during session )  Referring Practitioner: Christiano Angel  Diagnosis: acute metabolic encephalopathy   Subjective  Subjective: Pt sleepy in bed upon entry. Pt needed heavy encouragment to wake up and participate in therapy, but pleasant and agreeable after becoming more awake. Pain Assessment  Pain Assessment: 0-10(no c/o pain)     Orientation  Orientation  Overall Orientation Status: Impaired  Orientation Level: Oriented to person;Disoriented to place; Disoriented to time;Disoriented to situation     Objective    ADL  Feeding: (Setup; brought pt coffee, pt able to open sugar packet and stir into coffee)  UE Bathing: Dependent/Total(Total A with UE bathing with wipes seated in chair. )  LE Bathing: Dependent/Total;Independent(Total  A with LE bathing with wipes in stance at chair )        Balance  Standing Balance: Moderate assistance(Mod A x2 at EOB; Min Ax1 and CGA x1 at chair 1st trial,  CGA x1 at chair 2nd trial )  Standing Balance  Time: 1 min + 1 min + 1 min   Activity: Stance at EOB, functionl transfer from EOB to chair with RW; sit to/from stand from chair x 2 with RW   Comment: Mod x2 progressing to Min A x2 for transfer from EOB to chair; Min x1 and CGA x1 progressing to CGAx1 in stance at chair   Functional Mobility  Functional Mobility Comments: Pt progressing from Mod A x2 for transfers at beginning of session, to [address] A x2 for ambulation for stand step transfer to chair.  Max cues still needed for initiation, hand placement on walker, and hand placement on chair. Bed mobility  Supine to Sit: 2 Person assistance((Max A x 1 for trunk + min A for BLE)  Scootin Person assistance(Max A x 1 on bed but able to initiate scooting, Max A x 2 for scooting back in chair after pt scooted as far as he was able with VCs)  Transfers  Stand Step Transfers: 2 Person assistance(Min A x1 and CGA x1 )  Sit to stand: 2 Person assistance(Mod x1 first trial from EOB, Min x1 and CGA x1 second trial from chair; CGA x2 from chair on third trial )  Stand to sit: 2 Person assistance(Mod x2 and Max cues )                       Cognition  Overall Cognitive Status: Exceptions  Arousal/Alertness: Delayed responses to stimuli;Inconsistent responses to stimuli  Following Commands: Follows one step commands with increased time; Follows one step commands with repetition  Attention Span: Difficulty attending to directions  Memory: Decreased recall of recent events;Decreased recall of biographical Information;Decreased short term memory;Decreased long term memory  Safety Judgement: Decreased awareness of need for assistance  Problem Solving: Able to problem solve independently;Decreased awareness of errors  Insights: Decreased awareness of deficits  Initiation: Requires cues for all  Sequencing: Requires cues for some                    Exercises  Shoulder Elevation: 10 reps BUE   Elbow Flexion: 10 reps BUE                     Plan   Plan  Times per week: 2-5  Times per day: Daily  Current Treatment Recommendations: Strengthening, Endurance Training, Equipment Evaluation, Education, & procurement, Self-Care / ADL, Balance Training, Functional Mobility Training, Safety Education & Training, Patient/Caregiver Education & Training    If pt discharges prior to next treatment, this note will serve as d/c summary. Continue per POC if pt does not d/c.     AM-PAC Score  AM-PAC Inpatient Daily Activity Raw Score: 11 (20

## 2020-01-31 NOTE — PROGRESS NOTES
Physical Therapy  Facility/Department: [address] [facility] [address]  Daily Treatment Note  NAME: Santo Monsalve  : 1932  MRN: [id number]    Date of Service: 2020    Discharge Recommendations: Santo Monsalve scored a 10/24 on the AM-PAC short mobility form. Current research shows that an AM-PAC score of 17 or less is typically not associated with a discharge to the patient's home setting. Based on the patients AM-PAC score and their current functional mobility deficits, it is recommended that the patient have 3-5 sessions per week of Physical Therapy at d/c to increase the patients independence. PT Equipment Recommendations  Equipment Needed: No    Assessment   Body structures, Functions, Activity limitations: Decreased functional mobility ; Decreased endurance;Decreased strength;Decreased balance;Decreased safe awareness; Increased pain  Assessment: Pt cont with functional mobility below baseline but demos inc ease with transfers and standing tolerance today. Cont to require 2 person assist for safety but performs with improved initiation. Family present today and plan for pt to attend rehab at [facility] prior to returning home. Will cont IP PT to maximize functional mobility. Treatment Diagnosis: impaired gait and transfers  Prognosis: Good  Decision Making: Medium Complexity  PT Education: PT Role;Plan of Care; Functional Mobility Training;Transfer Training;Family Education;Home Exercise Program;Pressure Relief  Patient Education: would benefit from reinforcement  REQUIRES PT FOLLOW UP: Yes  Activity Tolerance  Activity Tolerance: Patient limited by fatigue;Patient limited by endurance     Patient Diagnosis(es): The primary encounter diagnosis was Altered mental status, unspecified altered mental status type. A diagnosis of Hyperglycemia was also pertinent to this visit.      has a past medical history of Aortic stenosis, Arthritis, Chronic a-fib, Dementia ([facility] [address] Hyperlipidemia, Hypertension, RW)  Standing - Dynamic: Poor;+(with RW)  Comments: Pt stood 30 sec x 1 with RW with mod A x 1 + CGA x 1, then stood 1 min x 1 with RW with CGA  Exercises  Comments: x 10 B seated marches, LAQ, hip abd, APs                       AM-PAC Score  AM-PAC Inpatient Mobility Raw Score : 10 (01/31/20 1546)  AM-PAC Inpatient T-Scale Score : 32.29 (01/31/20 1546)  Mobility Inpatient CMS 0-100% Score: 76.75 (01/31/20 1546)  Mobility Inpatient CMS G-Code Modifier : CL (01/31/20 1546)        Goals  Short term goals  Time Frame for Short term goals: discharge  Short term goal 1: Pt will transfer sit <--> stand with mod A x 1 - ongoing  Short term goal 2: Pt will demo static stance x 1 min with walker and CGA - MET 1/31  Upgrade to static stance with RW x 2 min with CGA  Short term goal 3: Pt will participate in bed <--> chair transfer assessment - MET 1/30   Short term goal 4: Pt will participate in gait assessment - MET 1/30 Upgraded goal: Pt will amb 10 ft with mod A x 1 - ongoing  Patient Goals   Patient goals : eventually return home    Plan    Plan  Times per week: 2-5  Current Treatment Recommendations: Strengthening, Gait Training, Patient/Caregiver Education & Training, Balance Training, Functional Mobility Training, Transfer Training, Home Exercise Program  Safety Devices  Type of devices: Chair alarm in place, Nurse notified, Gait belt, Call light within reach, Left in chair     Therapy Time   Individual Concurrent Group Co-treatment   Time In 1451         Time Out 1523         Minutes 32           Timed Code Treatment Minutes: 32    Total Treatment Minutes: 32    If patient is discharged prior to next treatment, this not will serve as the discharge summary. Dolores Rosado, MARYLU    Therapist was present, directed the patient's care, made skilled judgment, and was responsible for assessment and treatment of the patient.     Clearance Shantelle PT, DPT  [id number]

## 2020-02-01 VITALS
TEMPERATURE: 97 F | DIASTOLIC BLOOD PRESSURE: 64 MMHG | OXYGEN SATURATION: 97 % | BODY MASS INDEX: 22.41 KG/M2 | RESPIRATION RATE: 18 BRPM | SYSTOLIC BLOOD PRESSURE: 105 MMHG | HEIGHT: 70 IN | WEIGHT: 156.56 LBS | HEART RATE: 64 BPM

## 2020-02-01 LAB
A/G RATIO: 1.1 (ref 1.1–2.2)
ALBUMIN SERPL-MCNC: 3.2 G/DL (ref 3.4–5)
ALP BLD-CCNC: 124 U/L (ref 40–129)
ALT SERPL-CCNC: 24 U/L (ref 10–40)
ANION GAP SERPL CALCULATED.3IONS-SCNC: 13 MMOL/L (ref 3–16)
AST SERPL-CCNC: 31 U/L (ref 15–37)
BASOPHILS ABSOLUTE: 0 K/UL (ref 0–0.2)
BASOPHILS RELATIVE PERCENT: 0.2 %
BILIRUB SERPL-MCNC: 0.8 MG/DL (ref 0–1)
BUN BLDV-MCNC: 7 MG/DL (ref 7–20)
CALCIUM SERPL-MCNC: 8.4 MG/DL (ref 8.3–10.6)
CHLORIDE BLD-SCNC: 108 MMOL/L (ref 99–110)
CO2: 22 MMOL/L (ref 21–32)
CREAT SERPL-MCNC: 0.6 MG/DL (ref 0.8–1.3)
EOSINOPHILS ABSOLUTE: 0.5 K/UL (ref 0–0.6)
EOSINOPHILS RELATIVE PERCENT: 4.3 %
GFR AFRICAN AMERICAN: >60
GFR NON-AFRICAN AMERICAN: >60
GLOBULIN: 2.8 G/DL
GLUCOSE BLD-MCNC: 105 MG/DL (ref 70–99)
GLUCOSE BLD-MCNC: 110 MG/DL (ref 70–99)
GLUCOSE BLD-MCNC: 114 MG/DL (ref 70–99)
HCT VFR BLD CALC: 36.6 % (ref 40.5–52.5)
HEMOGLOBIN: 12 G/DL (ref 13.5–17.5)
INR BLD: 3.29 (ref 0.86–1.14)
LYMPHOCYTES ABSOLUTE: 0.8 K/UL (ref 1–5.1)
LYMPHOCYTES RELATIVE PERCENT: 7.3 %
MCH RBC QN AUTO: 33.4 PG (ref 26–34)
MCHC RBC AUTO-ENTMCNC: 32.8 G/DL (ref 31–36)
MCV RBC AUTO: 101.9 FL (ref 80–100)
MONOCYTES ABSOLUTE: 1.4 K/UL (ref 0–1.3)
MONOCYTES RELATIVE PERCENT: 12.3 %
NEUTROPHILS ABSOLUTE: 8.4 K/UL (ref 1.7–7.7)
NEUTROPHILS RELATIVE PERCENT: 75.9 %
PDW BLD-RTO: 13.4 % (ref 12.4–15.4)
PERFORMED ON: ABNORMAL
PERFORMED ON: ABNORMAL
PLATELET # BLD: 249 K/UL (ref 135–450)
PMV BLD AUTO: 8.9 FL (ref 5–10.5)
POTASSIUM REFLEX MAGNESIUM: 3.9 MMOL/L (ref 3.5–5.1)
PROTHROMBIN TIME: 38.6 SEC (ref 10–13.2)
RBC # BLD: 3.59 M/UL (ref 4.2–5.9)
SODIUM BLD-SCNC: 143 MMOL/L (ref 136–145)
TOTAL PROTEIN: 6 G/DL (ref 6.4–8.2)
WBC # BLD: 11 K/UL (ref 4–11)

## 2020-02-01 PROCEDURE — 85610 PROTHROMBIN TIME: CPT

## 2020-02-01 PROCEDURE — 85025 COMPLETE CBC W/AUTO DIFF WBC: CPT

## 2020-02-01 PROCEDURE — 36415 COLL VENOUS BLD VENIPUNCTURE: CPT

## 2020-02-01 PROCEDURE — 99238 HOSP IP/OBS DSCHRG MGMT 30/<: CPT | Performed by: INTERNAL MEDICINE

## 2020-02-01 PROCEDURE — 80053 COMPREHEN METABOLIC PANEL: CPT

## 2020-02-01 PROCEDURE — 6370000000 HC RX 637 (ALT 250 FOR IP): Performed by: STUDENT IN AN ORGANIZED HEALTH CARE EDUCATION/TRAINING PROGRAM

## 2020-02-01 PROCEDURE — 2580000003 HC RX 258: Performed by: STUDENT IN AN ORGANIZED HEALTH CARE EDUCATION/TRAINING PROGRAM

## 2020-02-01 RX ORDER — RISPERIDONE 0.5 MG/1
0.5 TABLET, FILM COATED ORAL NIGHTLY
Qty: 90 TABLET | Refills: 0 | Status: SHIPPED | OUTPATIENT
Start: 2020-02-01

## 2020-02-01 RX ORDER — RISPERIDONE 0.5 MG/1
0.5 TABLET, FILM COATED ORAL NIGHTLY
Qty: 90 TABLET | Refills: 0 | Status: CANCELLED | OUTPATIENT
Start: 2020-02-01

## 2020-02-01 RX ADMIN — FAMOTIDINE 20 MG: 20 TABLET, FILM COATED ORAL at 11:35

## 2020-02-01 RX ADMIN — ATORVASTATIN CALCIUM 40 MG: 40 TABLET, FILM COATED ORAL at 11:35

## 2020-02-01 RX ADMIN — Medication 10 ML: at 11:36

## 2020-02-01 RX ADMIN — MEMANTINE HYDROCHLORIDE 5 MG: 5 TABLET ORAL at 11:35

## 2020-02-01 RX ADMIN — CASTOR OIL AND BALSAM, PERU: 788; 87 OINTMENT TOPICAL at 11:36

## 2020-02-01 RX ADMIN — TAMSULOSIN HYDROCHLORIDE 0.4 MG: 0.4 CAPSULE ORAL at 11:35

## 2020-02-01 RX ADMIN — MELATONIN 2000 UNITS: at 11:36

## 2020-02-01 RX ADMIN — ASPIRIN 81 MG: 81 TABLET, COATED ORAL at 11:35

## 2020-02-01 RX ADMIN — DIGOXIN 125 MCG: 125 TABLET ORAL at 11:36

## 2020-02-01 ASSESSMENT — PAIN SCALES - GENERAL: PAINLEVEL_OUTOF10: 0

## 2020-02-01 ASSESSMENT — PAIN SCALES - WONG BAKER: WONGBAKER_NUMERICALRESPONSE: 0

## 2020-02-01 NOTE — PROGRESS NOTES
Patient is a little bit difficult to arouse today and seems to be tired. He is able to follow commands knows that he is in the hospital.  He denies any complaints as chest pain. Was seen by psychiatrist yesterday recommended switching to Seroquel overnight. Diagnosis likely acute delirium. White blood cells are back to normal and we did not find any evidence of acute infection, occluding negative urine culture blood culture. As for his hematuria and urolithiasis-he will follow-up with urology as an outpatient. I will keep urinary catheter for a few more days and asked the nursing home to do voiding trial with catheter removal in a few days.

## 2020-02-01 NOTE — PLAN OF CARE
Problem: Falls - Risk of:  Goal: Will remain free from falls  Description  Will remain free from falls  Outcome: Ongoing  Note:   Pt has remained free of falls this shift. Patient does not attempt to get up unassisted from bed but has made movements to get up from chair without staff but has been redirected before fully attempting. [employer] camera remains in room. Fall precautions remain in place. Pt instructed on use of call light but with little understanding or ability to remember following demonstration. Problem: Injury - Risk of, Physical Injury:  Goal: Will remain free from falls  Description  Will remain free from falls  Outcome: Ongoing  Note:   Pt has remained free of falls this shift. Patient does not attempt to get up unassisted from bed but has made movements to get up from chair without staff but has been redirected before fully attempting. [employer] camera remains in room. Fall precautions remain in place. Pt instructed on use of call light but with little understanding or ability to remember following demonstration. Problem: Sleep Pattern Disturbance:  Goal: Appears well-rested  Description  Appears well-rested  Outcome: Ongoing  Note:   Though it was reported that the patient slept most of the night he still was asleep most of the first half of the shift. He was easily arousable but quickly drifted back to sleep when no longer being talked to. Pt did not want to participate in care and become verbally aggressive when trying to continue with care. During the afternoon the patient was able to remain awake and was more interactive and accepting of care.

## 2020-02-01 NOTE — DISCHARGE SUMMARY
[address]  DISCHARGE SUMMARY    Patient ID: Estefani Del Cid                                             Discharge Date: 2/1/2020   Patient's PCP: Carolina Rodriguez MD                                          Discharge Physician: Pinky Goldberg MD  Admit Date: 1/28/2020   Admitting Physician: Carolina Rodriguez MD    DISCHARGE DIAGNOSES:   Pure hypercholesterolemia    Aortic valve disorder    Impaired fasting glucose    Vitamin D deficiency    Osteoporosis    Calculus of kidney    Testosterone deficiency    Cataract    Lumbar facet arthropathy    Lumbar compression fracture (HCC)    Hearing loss    Memory loss    BPH (benign prostatic hyperplasia)    Poor dentition    Delusions ([address]    S/P AVR    Late onset Alzheimer's disease without behavioral disturbance (HCC)    Idiopathic hypotension    Acute cystitis with hematuria    Excoriation of groin    Acute metabolic encephalopathy     Hospital Course: This 80 y.o. M p/w AMS and agitation, found to be hyperglycemic to 330s w mild-mod dehydration. Agitation and mental status improved w IVFs and sugar control. A1c 8.6%, new onset DM2. Was treated w insulin as inpatient, and he will be discharged on metformin as below, dose to be adjusted by PCP as outpatient. SNF placement requested by family, so patient was discharged to [address] [place] [address] SNF in stable condition. He will have a de la rosa in place for 3-4 more day and then d/c. Further he is to f/u w/ urology as he was noted to have hematuria 2/2 nonobstructing nephrolithiasis per CT abd/pelvis. Finally, he was assess by psyc and his risperidone was changed to HS and he is to follow up with psych at the facility.      Physical Exam:  /64   Pulse 64   Temp 97 °F (36.1 °C) (Axillary)   Resp 18   Ht 5' 10\" (1.778 m)   Wt 156 lb 9 oz (71 kg)   SpO2 97%   BMI 22.46 kg/m²     · General appearance: alert, appears stated age and cooperative, AAO x2  · Skin: Skin color, texture, turgor normal.   · HEENT: EOMI: Normocephalic, no lesions, without obvious abnormality. · Lungs: clear to auscultation bilaterally  · Heart: irregularly irregular rate and rhythm, S1, S2 normal, + systolic murmur, click, rub or gallop  · Abdomen: soft, non-tender; bowel sounds normal; no masses,  no organomegaly  · MSK: extremities normal, atraumatic, no cyanosis or edema  · Neurologic:  Mental status: Alert, oriented x2, thought content appropriate, cooperative  · Lines: PIV    Consults: psyc and urology  Significant Diagnostic Studies:  CT scan abdomen  Treatments: insulin  Disposition: SNF  Discharged Condition: Stable  Follow Up: Primary Care Physician in one week and urology in 3 weeks. DISCHARGE MEDICATION:     Medication List      CHANGE how you take these medications    risperiDONE 0.5 MG tablet  Commonly known as:  RISPERDAL  Take 1 tablet by mouth nightly  What changed:  See the new instructions. CONTINUE taking these medications    acetaminophen 325 MG tablet  Commonly known as:  TYLENOL  Take 2 tablets by mouth every 6 hours as needed for Pain or Fever (For mild pain level 1-3 or for fever > 100.5).      aspirin 81 MG EC tablet     atorvastatin 40 MG tablet  Commonly known as:  LIPITOR  TAKE ONE TABLET BY MOUTH DAILY     Co Q 10 10 MG Caps     digoxin 125 MCG tablet  Commonly known as:  Digox  TAKE ONE TABLET BY MOUTH DAILY     fish oil 1000 MG Cpdr     furosemide 20 MG tablet  Commonly known as:  LASIX  TAKE ONE TABLET BY MOUTH ON MONDAY, WEDNESDAY AND FRIDAY     memantine 5 MG tablet  Commonly known as:  NAMENDA  TAKE ONE TABLET BY MOUTH DAILY     MULTIVITAMIN PO     Myrbetriq 25 MG Tb24  Generic drug:  mirabegron  TAKE ONE TABLET BY MOUTH DAILY     tamsulosin 0.4 MG capsule  Commonly known as:  FLOMAX  TAKE ONE CAPSULE BY MOUTH DAILY     vitamin D 1000 UNIT Tabs tablet  Commonly known as:  CHOLECALCIFEROL     warfarin 5 MG tablet  Commonly known as: COUMADIN  Take as directed. If you are unsure how to take this medication, talk to your nurse or doctor.         STOP taking these medications    clotrimazole-betamethasone 1-0.05 % cream  Commonly known as:  Lotrisone           Where to Get Your Medications      You can get these medications from any pharmacy    Bring a paper prescription for each of these medications  · risperiDONE 0.5 MG tablet       Activity: activity as tolerated  Diet: diabetic diet  Wound Care: none needed    Time Spent on discharge is more than 30 minutes    Signed:  Megan Villafuerte MD   PGY 2  2/1/2020

## 2020-02-01 NOTE — PROGRESS NOTES
monitored closely and dose adjustments will be made as appropriate. Addendum: If DC today, recc holding a dose tonight and resume home dose on 2/2. INR should be checked no later than 2/3 Monday. Thank you for consulting pharmacy. Please call with any questions.     Jasmin Hobbs, PharmD  PGY1 Pharmacy Resident  Wireless: [phone] ([id number])  2/1/2020 12:05 PM

## 2020-02-01 NOTE — PROGRESS NOTES
AVS and EL faxed to [facility] [employer]. SL removed. Pt discharged off unit. Home supplied medications sent with pt via squad. Attempted x2 to call report unsuccessfully. Left phone number to be reached.

## 2020-02-03 ENCOUNTER — TELEPHONE (OUTPATIENT)
Dept: PHARMACY | Age: 85
End: 2020-02-03

## 2020-02-03 PROBLEM — R31.21 ASYMPTOMATIC MICROSCOPIC HEMATURIA: Status: ACTIVE | Noted: 2020-02-03

## 2020-02-04 LAB
BLOOD CULTURE, ROUTINE: NORMAL
CULTURE, BLOOD 2: NORMAL

## 2020-02-05 NOTE — ADT AUTH CERT
Utilization Reviews         Neurology [address] Day 4 (1/31/2020) by Etelvina Patel RN         Review Status Review Entered   Completed 2/4/2020 14:36       Criteria Review      Care Day: 4 Care Date: 1/31/2020 Level of Care:    Guideline Day 3    Level Of Care    (X) * Activity level acceptable    2/4/2020 2:36 PM EST by Montrell Milian      AM-PAC score 8/24- likely needing [facility] on d/c    ( ) * Complete discharge planning    Clinical Status    (X) * No infection, or status acceptable    (X) * Isolation not needed, or status acceptable    (X) * Respiratory status acceptable    (X) * Pain and nausea absent or adequately managed    (X) * Ventilatory status acceptable    (X) * Neurologic problems absent or stabilized    2/4/2020 2:36 PM EST by Montrell Milian      stabilized    (X) * Muscle or nerve damage absent or stable    ( ) * General Discharge Criteria met    Interventions    (X) * Intake acceptable    ( ) * No inpatient interventions needed    * Milestone   Additional Notes   1/31    Tele unit       98.4 (36.9)  18  64  138/82    Wbc-12.1   Still with de la rosa and clear urine      Treva -Psych consult--   Psych: pattern of waxing/waning agitation c/w delirium, may be improving with better BG control. It's possibly a minor decompensation of his dementia, however not enough to qualify for inpatient psych. Would recommend switching his Risperdal to HS and continuing Namenda. He should be seen by a NH psychiatrist.   -Change Risperdal to 0.5mg qHS   -Continue Namenda   -Agitation has not risen to the level of requiring medication interventions.    -Have SNF arrange NH psychiatry visit   -Psychiatrically cleared. Does not require inpatient psychiatry, sitter, or hold. Appropriate for outpatient level of care.  Safety plan includes: 911, PES, hotlines, and interventions discussed today.     2. Agitation recs: Patient is high risk for agitation, in the event of which we would recommend considering the following:               -recommend 1:1 observation: No               -prn med options: see above   3. Delirium: Patient is high risk for delirium.    -Nonpharmacologic evidence-based delirium precautions include the following: frequently reorient, shades up during day/down at night, TV off except for soft music only, have familiar objects at bedside, encourage friends/family to spend the night, minimize anticholingeric medications, minimize polypharmacy, minimize restraints (includes lines and/or foleys and/or feeding tubes as able) and minimize overnight checks/vitals to encourage restful consistent sleep patterns          IVF stopped   Cont  asa 81 qd, lipiitor 40 qd, digoxin 125mcg qd, pepcid po qd, lantus 10u HS, ssi qid, namenda qd myrbetriq po qd, risperdal qd, flonase qd, coumadin daily dosing      D/c plan- to [facility] (accepted pt) when medically stable, discharged       Neurology [address] [place] [address] Day 3 (1/30/2020) by Carlos Leo RN         Review Status Review Entered   Completed 2/4/2020 14:28       Criteria Review      Care Day: 3 Care Date: 1/30/2020 Level of Care:    Guideline Day 3    Level Of Care    (X) * Activity level acceptable    ( ) * Complete discharge planning    Clinical Status    (X) * No infection, or status acceptable    (X) * Isolation not needed, or status acceptable    (X) * Respiratory status acceptable    (X) * Pain and nausea absent or adequately managed    (X) * Ventilatory status acceptable    (X) * Neurologic problems absent or stabilized    2/4/2020 2:28 PM EST by Cinthia Seen      stabilized    (X) * Muscle or nerve damage absent or stable    ( ) * General Discharge Criteria met    Interventions    (X) * Intake acceptable    ( ) * No inpatient interventions needed    * Milestone   Additional Notes   1/30   Tele unit    Wbc-11.0, glucose 109,  106,  235      Pt alert, confused at times   Rojas now clear.  Urology will f/u outpt      PCP-    Patient reportedly somewhat confused and agitated, pulling off tele leads overnight but calmed down wout medical intervention   Acute metabolic encephalopathy    - unclear cause. Maybe 2/2 uncontrolled blood glucose and/or disrupted sleep cycle. Also on risperdal and namenda. UA unremarkable other than glucose. CXR w no PNA. CT head with no acute process. Digoxin level normal. Electrolytes normal.   - continue risperdal for now   - continue namenda   - TSH, B12, folate wnl   - Hemoglobin A1c 8.6, confirmed DM2   - hyperglycemia imroved, managed as below   - PT/OT        Hyperglycemia 2/2 new dx of DM2   - A1c 8.6   - nightly lantus 10 U   - sliding scale low dose   - transition to metformin on discharge   - hypoglycemia protocol   Hematuria - resolved   - both gross and apparent on UA, urine cleared up now   - CT abd/pelvis showed nonobstructing nephrolithiasis   - Urology consulted, will be managed as outpatient         PT AM-PAC score 8/24   Assessment: Pt cont with functional mobility below baseline but participates in bed to chair transfer and short ambulation with RW today. Cont to require 2 person assist for safety with max cues for sequencing and initiation of all functional mobility. Family present today and states they understand and agree with pt attending rehab prior to returning home. Will cont IP PT to maximize functional mobility.        Palliative consult- goals symptom management and SNF for short term on d/c          Cont , asa 81 qd, lipiitor 40 qd, digoxin 125mcg qd, pepcid po qd, lantus 10u HS, ssi qid, namenda qd myrbetriq po qd, risperdal qd, flonase qd, coumadin daily dosing      D/c plan- pt/family requesting short term [facility] on d/c       PA Recommendation-IP by Ondina Severino RN         Review Status Review Entered   In Primary 1/30/2020 12:31       Criteria Review   Letter of Status Determination: Current Status   Inpatient is Appropriate            Pt Name:  Zahira Adler   MR#  [id number]   [facility]#  [id number]   [address] [place] [address] [id number]  @ [facility]   Hospitalization date  1/28/2020  4:38 PM   Current Attending Physician  Jose Brooks MD   Principal diagnosis  AMS   Clinicals  Kenyon Longo is a 79 yo male with hx of HTN, HLD, dementia, aortic valve repair and Afib on coumadin who presents with altered mental status. Family noticed patient was more verbally aggressive this morning along with some confusion. He has baseline dementia but family reported agitation was the first time today. Mentation seems to be waxing and waning and he has been sleeping most of the day and night. He is on risperdal and namenda. Denies headache, fever, cough, chest pain, abdominal pain, dysuria, or diarrhea.  His blood glucose reading was elevated in the ED  Pt with acute metabolic encephalopathy, Pt with hematuria, on IVF       [place] MCG criteria   Does  NOT apply     STATUS DETERMINATION  On the basis of clinical data, available documentaion, we believe that the current status of this patient as Inpatient is Appropriate      The final decision of the patient's hospitalization status depends on the attending physician's judgment    Additional comments      Insurance  Payor: [facility] MEDICARE / Plan: [place] ESSENTIAL/PLUS / Product Type: *No Product type* /    [unfilled]               Thanks,  Dg Faust MD  Physician Advisor

## 2020-02-10 ENCOUNTER — CARE COORDINATION (OUTPATIENT)
Dept: CASE MANAGEMENT | Age: 85
End: 2020-02-10

## 2020-02-12 NOTE — TELEPHONE ENCOUNTER
Spoke with patient's wife Andres Rosenbaum), who reports patient will likely be discharged from [facility] on 2/20. Ismael Elise, PharmD, BCPS  [facility]  Ph: [phone]  2/12/2020 11:59 AM

## 2020-02-17 RX ORDER — ATORVASTATIN CALCIUM 40 MG/1
TABLET, FILM COATED ORAL
Qty: 90 TABLET | Refills: 0 | Status: SHIPPED | OUTPATIENT
Start: 2020-02-17

## 2020-02-17 RX ORDER — MIRABEGRON 25 MG/1
TABLET, FILM COATED, EXTENDED RELEASE ORAL
Qty: 90 TABLET | Refills: 0 | Status: SHIPPED | OUTPATIENT
Start: 2020-02-17

## 2020-03-03 NOTE — TELEPHONE ENCOUNTER
Spoke with patient's wife Caryle Likes). Patient has been discharged from nursing home on 3/2. Speedy Matos from [facility] will come in tomorrow to check INR. Instructed her to tell Jay Swann to call the medication management clinic tomorrow for INR management.       Rafal Malloy, PharmD  PGY1 Pharmacy Resident  3/3/2020 11:21 AM

## 2020-03-04 ENCOUNTER — TELEPHONE (OUTPATIENT)
Dept: PHARMACY | Age: 85
End: 2020-03-04

## 2020-03-05 NOTE — TELEPHONE ENCOUNTER
Spoke with Cathy Momin RN from [facility] today. She informed me that the patient was admitted to [facility] yesterday after the [facility] initial visit with patient. Per Cathy Momin they are trying to get patient enrolled with the [facility] program as long as the wife agrees and the [place] accepts the patient, therefore unsure where patient will be going after discharge from [facility]. Unable to see records from [address] [place] [address] for [facility] currently. Provided Cathy Momin my name and number to the clinic and asked her to call if she hears anything about potential discharge date or who will be following the patient after.      Miriam Molina, PharmD, BCPS  [facility]  Becky: [phone]  Efren: [phone]  3/5/2020 9:33 AM

## 2020-03-09 ENCOUNTER — CARE COORDINATION (OUTPATIENT)
Dept: CASE MANAGEMENT | Age: 85
End: 2020-03-09

## 2020-03-11 ENCOUNTER — ANTI-COAG VISIT (OUTPATIENT)
Dept: PHARMACY | Age: 85
End: 2020-03-11

## 2020-03-11 PROBLEM — Z95.2 S/P AVR: Status: RESOLVED | Noted: 2018-01-19 | Resolved: 2020-03-11

## 2020-03-11 NOTE — PROGRESS NOTES
Per chart review, patient now is under care of [facility] [place] after discharge from [facility]. Called home and spoke with wife Curry. According to her,  The patient no longer takes warfarin. Will discharge him since he no longer takes warfarin.        Rafal Mcmanus, PharmD  PGY1 Pharmacy resident  [facility]  Ph: [phone]  3/11/2020 9:52 AM

## 2020-03-11 NOTE — TELEPHONE ENCOUNTER
Per chart review, patient is under the care of [address] after discharged from [facility]. Called home and spoke with his wife, Rosalie Wiggins. According to her, the patient no longer takes warfarin. Will discharge patient from the clinic since patient no longer takes warfarin.      Negin Albrecht, PharmD  PGY1 Pharmacy resident  [facility]  Ph: [phone]  3/11/2020 9:47 AM

## 2020-03-12 NOTE — TELEPHONE ENCOUNTER
[facility]. Per care coordinator there, patient was discharged without warfarin. Patient currently on ASA 81mg daily. Care coordinator unsure why warfarin was discontinued during the transition to the hospice care.      Casandra Li, PharmD  PGY1 Pharmacy resident  [facility]  Ph: [phone]  3/12/2020 4:00 PM
